# Patient Record
Sex: FEMALE | Race: WHITE | NOT HISPANIC OR LATINO | Employment: UNEMPLOYED | URBAN - METROPOLITAN AREA
[De-identification: names, ages, dates, MRNs, and addresses within clinical notes are randomized per-mention and may not be internally consistent; named-entity substitution may affect disease eponyms.]

---

## 2018-01-11 ENCOUNTER — HOSPITAL ENCOUNTER (INPATIENT)
Facility: HOSPITAL | Age: 83
LOS: 4 days | DRG: 086 | End: 2018-01-16
Attending: SURGERY | Admitting: SURGERY
Payer: MEDICARE

## 2018-01-11 DIAGNOSIS — I62.00 SUBDURAL HEMORRHAGE (HCC): Primary | ICD-10-CM

## 2018-01-11 LAB
ANION GAP SERPL CALCULATED.3IONS-SCNC: 9 MMOL/L (ref 4–13)
BUN SERPL-MCNC: 147 MG/DL (ref 5–25)
CALCIUM SERPL-MCNC: 8.9 MG/DL (ref 8.3–10.1)
CHLORIDE SERPL-SCNC: 104 MMOL/L (ref 100–108)
CO2 SERPL-SCNC: 28 MMOL/L (ref 21–32)
CREAT SERPL-MCNC: 3.95 MG/DL (ref 0.6–1.3)
ERYTHROCYTE [DISTWIDTH] IN BLOOD BY AUTOMATED COUNT: 14.6 % (ref 11.6–15.1)
GFR SERPL CREATININE-BSD FRML MDRD: 10 ML/MIN/1.73SQ M
GLUCOSE SERPL-MCNC: 98 MG/DL (ref 65–140)
HCT VFR BLD AUTO: 31 % (ref 34.8–46.1)
HGB BLD-MCNC: 10.4 G/DL (ref 11.5–15.4)
INR PPP: 1.85 (ref 0.86–1.16)
MCH RBC QN AUTO: 31.8 PG (ref 26.8–34.3)
MCHC RBC AUTO-ENTMCNC: 33.5 G/DL (ref 31.4–37.4)
MCV RBC AUTO: 95 FL (ref 82–98)
PLATELET # BLD AUTO: 187 THOUSANDS/UL (ref 149–390)
PMV BLD AUTO: 11.6 FL (ref 8.9–12.7)
POTASSIUM SERPL-SCNC: 3.8 MMOL/L (ref 3.5–5.3)
PROTHROMBIN TIME: 21.5 SECONDS (ref 12.1–14.4)
RBC # BLD AUTO: 3.27 MILLION/UL (ref 3.81–5.12)
SODIUM SERPL-SCNC: 141 MMOL/L (ref 136–145)
WBC # BLD AUTO: 5.94 THOUSAND/UL (ref 4.31–10.16)

## 2018-01-11 PROCEDURE — 96374 THER/PROPH/DIAG INJ IV PUSH: CPT

## 2018-01-11 PROCEDURE — 85027 COMPLETE CBC AUTOMATED: CPT | Performed by: STUDENT IN AN ORGANIZED HEALTH CARE EDUCATION/TRAINING PROGRAM

## 2018-01-11 PROCEDURE — 80048 BASIC METABOLIC PNL TOTAL CA: CPT | Performed by: STUDENT IN AN ORGANIZED HEALTH CARE EDUCATION/TRAINING PROGRAM

## 2018-01-11 PROCEDURE — 85610 PROTHROMBIN TIME: CPT | Performed by: STUDENT IN AN ORGANIZED HEALTH CARE EDUCATION/TRAINING PROGRAM

## 2018-01-11 PROCEDURE — C9132 KCENTRA, PER I.U.: HCPCS | Performed by: STUDENT IN AN ORGANIZED HEALTH CARE EDUCATION/TRAINING PROGRAM

## 2018-01-11 PROCEDURE — 36415 COLL VENOUS BLD VENIPUNCTURE: CPT | Performed by: STUDENT IN AN ORGANIZED HEALTH CARE EDUCATION/TRAINING PROGRAM

## 2018-01-11 RX ORDER — ALLOPURINOL 300 MG/1
300 TABLET ORAL DAILY
COMMUNITY
End: 2018-05-27 | Stop reason: DRUGHIGH

## 2018-01-11 RX ORDER — MELATONIN
2000 DAILY
COMMUNITY

## 2018-01-11 RX ORDER — POTASSIUM CHLORIDE 750 MG/1
10 TABLET, EXTENDED RELEASE ORAL EVERY OTHER DAY
COMMUNITY

## 2018-01-11 RX ORDER — ATENOLOL 50 MG/1
50 TABLET ORAL 2 TIMES DAILY
Status: DISCONTINUED | OUTPATIENT
Start: 2018-01-11 | End: 2018-01-16 | Stop reason: HOSPADM

## 2018-01-11 RX ORDER — FUROSEMIDE 40 MG/1
20 TABLET ORAL DAILY
COMMUNITY
End: 2018-05-27 | Stop reason: DRUGHIGH

## 2018-01-11 RX ORDER — ALLOPURINOL 300 MG/1
300 TABLET ORAL DAILY
Status: DISCONTINUED | OUTPATIENT
Start: 2018-01-12 | End: 2018-01-16 | Stop reason: HOSPADM

## 2018-01-11 RX ORDER — MELATONIN
2000 DAILY
Status: DISCONTINUED | OUTPATIENT
Start: 2018-01-12 | End: 2018-01-16 | Stop reason: HOSPADM

## 2018-01-11 RX ORDER — ASPIRIN 81 MG/1
81 TABLET ORAL DAILY
COMMUNITY
End: 2018-01-16 | Stop reason: HOSPADM

## 2018-01-11 RX ORDER — ATENOLOL 50 MG/1
50 TABLET ORAL 2 TIMES DAILY
COMMUNITY
End: 2018-06-01 | Stop reason: HOSPADM

## 2018-01-11 RX ORDER — VALSARTAN AND HYDROCHLOROTHIAZIDE 320; 25 MG/1; MG/1
1 TABLET, FILM COATED ORAL DAILY
Status: ON HOLD | COMMUNITY
End: 2018-05-27

## 2018-01-11 RX ORDER — WARFARIN SODIUM 2 MG/1
2 TABLET ORAL
COMMUNITY
End: 2018-01-16 | Stop reason: HOSPADM

## 2018-01-11 RX ADMIN — DESMOPRESSIN ACETATE 20 MCG: 4 SOLUTION INTRAVENOUS at 23:10

## 2018-01-11 RX ADMIN — ATENOLOL 50 MG: 50 TABLET ORAL at 23:24

## 2018-01-11 RX ADMIN — PROTHROMBIN, COAGULATION FACTOR VII HUMAN, COAGULATION FACTOR IX HUMAN, COAGULATION FACTOR X HUMAN, PROTEIN C, PROTEIN S HUMAN, AND WATER 3156 UNITS: KIT at 19:51

## 2018-01-12 ENCOUNTER — APPOINTMENT (OUTPATIENT)
Dept: RADIOLOGY | Facility: HOSPITAL | Age: 83
DRG: 086 | End: 2018-01-12
Payer: MEDICARE
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
## 2018-01-12 PROBLEM — F02.80 ALZHEIMER DISEASE (HCC): Status: ACTIVE | Noted: 2018-01-12

## 2018-01-12 PROBLEM — I50.9 CHF (CONGESTIVE HEART FAILURE) (HCC): Status: ACTIVE | Noted: 2018-01-12

## 2018-01-12 PROBLEM — G30.9 ALZHEIMER DISEASE (HCC): Status: ACTIVE | Noted: 2018-01-12

## 2018-01-12 PROBLEM — I60.9 SAH (SUBARACHNOID HEMORRHAGE) (HCC): Status: ACTIVE | Noted: 2018-01-12

## 2018-01-12 PROBLEM — I62.00 SUBDURAL HEMORRHAGE (HCC): Status: ACTIVE | Noted: 2018-01-12

## 2018-01-12 PROBLEM — I48.91 ATRIAL FIBRILLATION (HCC): Status: ACTIVE | Noted: 2018-01-12

## 2018-01-12 PROBLEM — S06.369A INTRAPARENCHYMAL HEMATOMA OF BRAIN DUE TO TRAUMA (HCC): Status: ACTIVE | Noted: 2018-01-12

## 2018-01-12 PROBLEM — N17.9 AKI (ACUTE KIDNEY INJURY) (HCC): Status: ACTIVE | Noted: 2018-01-12

## 2018-01-12 PROBLEM — I10 HTN (HYPERTENSION): Status: ACTIVE | Noted: 2018-01-12

## 2018-01-12 PROBLEM — D68.9 COAGULOPATHY (HCC): Status: ACTIVE | Noted: 2018-01-12

## 2018-01-12 LAB
ANION GAP SERPL CALCULATED.3IONS-SCNC: 10 MMOL/L (ref 4–13)
BUN SERPL-MCNC: 140 MG/DL (ref 5–25)
CALCIUM SERPL-MCNC: 9.3 MG/DL (ref 8.3–10.1)
CHLORIDE SERPL-SCNC: 105 MMOL/L (ref 100–108)
CO2 SERPL-SCNC: 26 MMOL/L (ref 21–32)
CREAT SERPL-MCNC: 3.58 MG/DL (ref 0.6–1.3)
ERYTHROCYTE [DISTWIDTH] IN BLOOD BY AUTOMATED COUNT: 14.7 % (ref 11.6–15.1)
GFR SERPL CREATININE-BSD FRML MDRD: 11 ML/MIN/1.73SQ M
GLUCOSE SERPL-MCNC: 100 MG/DL (ref 65–140)
HCT VFR BLD AUTO: 33 % (ref 34.8–46.1)
HGB BLD-MCNC: 11.1 G/DL (ref 11.5–15.4)
INR PPP: 1.2 (ref 0.86–1.16)
MCH RBC QN AUTO: 32.1 PG (ref 26.8–34.3)
MCHC RBC AUTO-ENTMCNC: 33.6 G/DL (ref 31.4–37.4)
MCV RBC AUTO: 95 FL (ref 82–98)
PLATELET # BLD AUTO: 193 THOUSANDS/UL (ref 149–390)
PMV BLD AUTO: 11.2 FL (ref 8.9–12.7)
POTASSIUM SERPL-SCNC: 3.6 MMOL/L (ref 3.5–5.3)
PROTHROMBIN TIME: 15.3 SECONDS (ref 12.1–14.4)
RBC # BLD AUTO: 3.46 MILLION/UL (ref 3.81–5.12)
SODIUM SERPL-SCNC: 141 MMOL/L (ref 136–145)
WBC # BLD AUTO: 7.39 THOUSAND/UL (ref 4.31–10.16)

## 2018-01-12 PROCEDURE — 99285 EMERGENCY DEPT VISIT HI MDM: CPT

## 2018-01-12 PROCEDURE — 36415 COLL VENOUS BLD VENIPUNCTURE: CPT | Performed by: STUDENT IN AN ORGANIZED HEALTH CARE EDUCATION/TRAINING PROGRAM

## 2018-01-12 PROCEDURE — 70450 CT HEAD/BRAIN W/O DYE: CPT

## 2018-01-12 PROCEDURE — 85610 PROTHROMBIN TIME: CPT | Performed by: STUDENT IN AN ORGANIZED HEALTH CARE EDUCATION/TRAINING PROGRAM

## 2018-01-12 PROCEDURE — 85027 COMPLETE CBC AUTOMATED: CPT | Performed by: STUDENT IN AN ORGANIZED HEALTH CARE EDUCATION/TRAINING PROGRAM

## 2018-01-12 PROCEDURE — 80048 BASIC METABOLIC PNL TOTAL CA: CPT | Performed by: STUDENT IN AN ORGANIZED HEALTH CARE EDUCATION/TRAINING PROGRAM

## 2018-01-12 RX ORDER — SODIUM CHLORIDE 9 MG/ML
50 INJECTION, SOLUTION INTRAVENOUS CONTINUOUS
Status: DISCONTINUED | OUTPATIENT
Start: 2018-01-12 | End: 2018-01-14

## 2018-01-12 RX ORDER — ACETAMINOPHEN 325 MG/1
650 TABLET ORAL EVERY 8 HOURS SCHEDULED
Status: DISCONTINUED | OUTPATIENT
Start: 2018-01-12 | End: 2018-01-16 | Stop reason: HOSPADM

## 2018-01-12 RX ADMIN — SODIUM CHLORIDE 50 ML/HR: 0.9 INJECTION, SOLUTION INTRAVENOUS at 16:20

## 2018-01-12 RX ADMIN — ACETAMINOPHEN 650 MG: 325 TABLET, FILM COATED ORAL at 21:41

## 2018-01-12 RX ADMIN — VITAMIN D, TAB 1000IU (100/BT) 2000 UNITS: 25 TAB at 08:53

## 2018-01-12 RX ADMIN — ALLOPURINOL 300 MG: 300 TABLET ORAL at 08:53

## 2018-01-12 RX ADMIN — SODIUM CHLORIDE 50 ML/HR: 0.9 INJECTION, SOLUTION INTRAVENOUS at 12:16

## 2018-01-12 NOTE — ED NOTES
Patient care handoff occurred at this time   Report given by Tobi Bernstein, RN     Alanna Joya RN  01/11/18 2051

## 2018-01-12 NOTE — ASSESSMENT & PLAN NOTE
- no evidence of acute exacerbation  - hold lasix an additional day due to low SBP prior to atenolol and need for IVF hydration due to ANABEL

## 2018-01-12 NOTE — ASSESSMENT & PLAN NOTE
· Per family altered mental status and confusion is baseline  · Oriented times self but not place or time a baseline  · Consider geriatrics consultation

## 2018-01-12 NOTE — TERTIARY TRAUMA SURVEY
Progress Note - Tertiary Trauma Survery   Cheri Efrain 80 y o  female MRN: [id number]  Unit/Bed#: ED 12 Encounter: [id number]    Summary of Diagnosed Injuries:   SDH  Intraparenchymal hemorrhage  Coumadin coagulopathy  Fall    Clinical Plan:   * Subdural hemorrhage [facility])   Assessment & Plan    Admit to [facility] Protocol  Neurosurgery consult  Appreciate consultation  Repeat CT head today  Attempt to obtain images from [facility]   Correct Coumadin coagulopathy  Received 10 mg vitamin K, FFP, DDAVP and KCentra  INR 1 2  Repeat in am  Hold all AC/AP  Q 1hr neuro checks  PT/OT            Coagulopathy (HCC)   Assessment & Plan    Received 10 mg vitamin K and 1 unit FFP prior to arrival   DDAVP and case and treatment facility  Repeat INR 1 2  Check INR in a m  Goal less than 2 0        ANABEL (acute kidney injury) ([facility] [address] )   Assessment & Plan    Gentle IV hydration  Monitor I & Os  Trend BUN/creatinine        Atrial fibrillation (HCC)   Assessment & Plan    Rate controlled  Continue atenolol 50 mg b i d  Hold Coumadin in setting of ICH        CHF (congestive heart failure) ([facility] [address] )   Assessment & Plan    Does not appear in acute exacerbation  Monitor fluid balance  Goal fluid balance net even  Gentle fluid hydration in setting of ANABEL  Hold home Lasix  Full likely restart tomorrow  HTN (hypertension)   Assessment & Plan      Hold home antihypertensives in setting of soft systolic blood pressures  Alzheimer disease   Assessment & Plan    Appears at baseline  GCS 14  Oriented to person only  Frequent reorienting/redirection  Attempt to regulate sleep-wake cycles  Geriatric consultation  Mechanism of Injury: Fall    Transfer from: home  Outside Films Received: no   Received reports  No images were uploaded to PACs  Tertiary Exam Due on:  1/12/17    Vitals: Blood pressure 94/60, pulse 62, temperature 97 5 °F (36 4 °C), temperature source Oral, resp   rate 22, height 5' (1 524 m), weight 81 6 kg (180 lb), SpO2 96 %  ,Body mass index is 35 15 kg/m²  CT / RADIOGRAPHS: ALL RESULTS MUST BE CONFIRMED BY FACULTY OR PRINTED REPORT    CT HEAD:  CT CHEST:    CT FACE:  CT ABDOMEN / PELVIS:   CT CERVICAL SPINE:  XR PELVIS:    CT THORACIC / LUMBAR SPINE:  CXR CHEST:    OTHER: OTHER:    OTHER:  OTHER:    OTHER: OTHER:    OTHER:  OTHER:    OTHER:  OTHER:      Consultants - List Service/ Faculty and Date:  Neurosurgery  Active medications:           Current Facility-Administered Medications:     allopurinol (ZYLOPRIM) tablet 300 mg, 300 mg, Oral, Daily, 300 mg at 01/12/18 0853    atenolol (TENORMIN) tablet 50 mg, 50 mg, Oral, BID, Stopped at 01/12/18 0856    cholecalciferol (VITAMIN D3) tablet 2,000 Units, 2,000 Units, Oral, Daily, 2,000 Units at 01/12/18 0853    sodium chloride 0 9 % infusion, 50 mL/hr, Intravenous, Continuous, 50 mL/hr at 01/12/18 1216    Current Outpatient Prescriptions:     allopurinol (ZYLOPRIM) 300 mg tablet    aspirin (ECOTRIN LOW STRENGTH) 81 mg EC tablet    atenolol (TENORMIN) 50 mg tablet    cholecalciferol (VITAMIN D3) 1,000 units tablet    furosemide (LASIX) 40 mg tablet    Melatonin-Pyridoxine ER (MELATONEX) 3-10 MG TBCR    potassium chloride (K-DUR,KLOR-CON) 10 mEq tablet    valsartan-hydrochlorothiazide (DIOVAN-HCT) 320-25 MG per tablet    warfarin (COUMADIN) 2 mg tablet    No intake or output data in the 24 hours ending 01/12/18 1246    Invasive Devices          No matching active lines, drains, or airways          CAGE-AID Questionnaire:    Was the patient able to participate in the CAGE-AID screening questions on admission? Yes    Is the patient 65 years or older: YES:    1  Before the illness or injury that brought you to the Emergency, did you need someone to help you on a regular basis? 1=Yes   2  Since the illness or injury that brought you to the Emergency, have you needed more help than usual to take care of yourself? 1=Yes   3   Have you been hospitalized for one or more nights during the past 6 months (excluding a stay in the Emergency Department)? 0=No   4  In general, do you see well? 1=No   5  In general, do you have serious problems with your memory? 1=Yes   6  Do you take more than three different medications everyday? 1=Yes   TOTAL   5     Did you order a geriatric consult if the score was 2 or greater?: yes    1  GCS:  GCS Total:  14, Eye Opening:   Spontaneous = 4, Motor Response: Obeys commands = 6 and Verbal Response:  Confused = 4  2  Head:   a  Inspect and palpate SCALP for:  lac/abrasion:  None, b  Inspect and palpate FACE for:   lac/abrasion:  None, c  Examine EYES Record: pupil size/reaction:  Appropriate, d  Inspect MOUTH for:  Present and e  Inspect EARS for:  CSF leak, hemotympanum:  None small abrasion to auricle of left ear  3  Neck:   a  Inspect for lac/abrasion/hematoma/swelling:  None, d   C-spine cleared radiographically:  yes and e   C-spine cleared clinically:  yes  4  Chest:   WNL and a  Inspect for lac/abrasion/hematoma/swelling:  None  5  Abdomen/Pelvis:   a  Inspect for:    lac/abrasion:  None, b   Palpate for:   tenderness/guarding:  None and c  PELVIS:  stability/tenderness:  stable  6  Back (log roll with spinal immobilization unless cleared radiographically):   a  Inspect back of head/entire back for:   swelling/ecchymosis:  None and b   Palpate for tenderness and deformity:  vertebrae (T-L-S spine):  None  7  Extremities:   Lacs, abrasions, swelling, ecchymosis:  Multiple ecchymoses on bilateral upper extremities and multiple stages of healing  Tenderness, pain with motor, instability: N/A  8  Peripheral Nerves: WNL    Do NOT use the following abbreviations: DTO, gr, Lynn, MS, MSO4, MgSO4, Nitro, QD, QID, QOD, u, , ?, ?g or trailing zeros   Always use a zero before a decimal     Labs:   CBC:   Lab Results   Component Value Date    WBC 7 39 01/12/2018    HGB 11 1 (L) 01/12/2018    HCT 33 0 (L) 01/12/2018    MCV 95 01/12/2018     01/12/2018    MCH 32 1 01/12/2018    MCHC 33 6 01/12/2018    RDW 14 7 01/12/2018    MPV 11 2 01/12/2018     CMP:   Lab Results   Component Value Date     01/12/2018     01/12/2018    CO2 26 01/12/2018    ANIONGAP 10 01/12/2018     (H) 01/12/2018    CREATININE 3 58 (H) 01/12/2018    GLUCOSE 100 01/12/2018    CALCIUM 9 3 01/12/2018    EGFR 11 01/12/2018     Coagulation:   Lab Results   Component Value Date    INR 1 20 (H) 01/12/2018

## 2018-01-12 NOTE — ASSESSMENT & PLAN NOTE
- slowly improving, continue gentle IV hydration  - Monitor I & Os  - repeat BMP in AM  - unclear baseline, no other charts for patient in system and no prior BMP

## 2018-01-12 NOTE — ASSESSMENT & PLAN NOTE
Received 10 mg vitamin K and 1 unit FFP prior to arrival   DDAVP and case and treatment facility  Repeat INR 1 2  Check INR in a m    Goal less than 2 0

## 2018-01-12 NOTE — CONSULTS
Consultation - Neurosurgery   Yaa Sanchez 80 y o  female MRN: [id number]  Unit/Bed#: [facility] 927-01 Encounter: [id number]        * Subdural hemorrhage ([address] )   Assessment & Plan    · Patient presented status post fall out of bed 1/10  Initially presented to [facility]   Upon completion of CT head intracranial hemorrhage was identified and patient was transferred to [facility] for neurosurgical evaluation  · Repeat CT head 1/12 reveals scattered multicompartmental intracranial hemorrhage  Interhemispheric subdural and subarachnoid hemorrhage  Right frontal region of rounded intraparenchymal hemorrhage with adjacent subarachnoid hemorrhage  Subarachnoid hemorrhage within left sylvian fissure and right frontal lobe  · Cannot exclude aneurysmal cause of hemorrhage, but due to mechanism more consistent with traumatic hemorrhage  Defer CTA at this time  · Upon discussion with son-in-law Ford Mad[place])  of [facility] [place]) he expresses the patient is DNR level 3 upon discussion with attending last evening patient would not want any heroic measures  · No neurosurgical intervention indicated at this time  Upon further discussion with daughter, Silke Barlow- she does not want any further surveillance imaging even in setting of change in mental status  She would like to focus on patient remaining comfortable  · May continue neuro checks at this time  Patient GCS 14 with baseline confusion  Alert and oriented times self but not place or time  · Patient on aspirin and Coumadin prior to admission for history of atrial fibrillation  · INR on admission was 1 8  Improved to 1 2 with reversal   Continue to hold his Coumadin and aspirin at this time  May consider indefinite discontinuation based on risk benefit associated with fall risk  Hold Coumadin aspirin at least 2 weeks post-trauma  · Defer pharmacologic DVT prophylaxis at this time     · Defer Keppra at this time due to age of 80 and concern of somnolence with antiepileptics  Intraparenchymal hematoma of brain due to trauma [facility])   Assessment & Plan    Plan as above  SAH (subarachnoid hemorrhage) ([facility] [address] )   Assessment & Plan    Plan as above  Atrial fibrillation (HCC)   Assessment & Plan    · On Coumadin and aspirin prior to admission- hold for at least 2 weeks post trauma  Appreciate plan above  Fall from bed   Assessment & Plan    · Patient uses roller walker at baseline  Currently resides with daughter and son-in-law  · Head prolonged previous hospitalization  Prior to that patient was living home independently  · Per son-in-law patient has not had prior falls  · Patient cleared ambulate with physical therapy and occupational therapy  Will likely need placement or home services upon discharge  Alzheimer disease   Assessment & Plan    · Per family altered mental status and confusion is baseline  · Oriented times self but not place or time a baseline  · Consider geriatrics consultation  Upon discussion with daughter and Marcus Ada, family does not request any further surveillance imaging or follow-up due to expressed preference is of DNR level 3 without extraordinary measures  Neurosurgery will see on an as-needed basis remainder of hospitalization  Please call with further questions or concerns  History of Present Illness     History, ROS and PFSH limited from patient due to altered mental status  Limited history obtained from patient's son-in-law, Doris Lim  HPI: Jules Zimmer is a 80y o  year old female, past medical history of atrial fibrillation on Coumadin aspirin, Alzheimer's disease, CHF, hypertension, gout, presents with multicompartmental hemorrhage including interhemispheric subdural, subarachnoid, right frontal intraparenchymal hemorrhage after fall out of bed  Patient resides with her daughter and son-in-law  Night of 1/10 they heard her fall out of bed    Patient was helped back to bed, on morning of 1/11 she was known to have generalized weakness  EMS was called  Upon arrival she was noted to be hypotensive  She was taken to [facility]  Upon completion of CT head multicompartmental hemorrhage was identified and patient was transferred to [facility] for neurosurgical evaluation  Upon arrival INR was 1 8  Upon following reversal INR proved to 1 2  Patient was noted to be GCS 14, oriented times herself but not place or time  Upon further discussion with son-in-law this is her baseline  Patient denies any complaints including headache, dizziness, confusion, neck pain, back pain  She is not aware of events leading up to hospitalization  Patient has been living with her son and daughter-in-law for several weeks to months  Prior she had a prolonged hospitalization but before that was living independently  Daughter is retired RN and states that she has a history of stage 5 dementia  Inpatient consult to Neurosurgery  Consult performed by: Lebron Kednrick  Consult ordered by: Ziyad Sanches          Review of Systems   Unable to perform ROS: Dementia   Eyes: Negative for visual disturbance  Respiratory: Negative for shortness of breath  Cardiovascular: Negative for chest pain  Gastrointestinal: Negative for abdominal pain  Genitourinary: Negative for difficulty urinating  Musculoskeletal: Positive for back pain  Negative for neck pain  Neurological: Negative for dizziness and headaches  Psychiatric/Behavioral: Positive for confusion  Historical Information   Past Medical History:   Diagnosis Date    A-fib [facility])     Alzheimer's dementia     CHF (congestive heart failure) (HCC)     Gout     Hypertension     Renal disorder      History reviewed  No pertinent surgical history  History   Alcohol Use No     History   Drug Use No     History   Smoking Status    Never Smoker   Smokeless Tobacco    Never Used     History reviewed   No pertinent family history  Meds/Allergies   all current active meds have been reviewed, current meds:   Current Facility-Administered Medications   Medication Dose Route Frequency    allopurinol (ZYLOPRIM) tablet 300 mg  300 mg Oral Daily    atenolol (TENORMIN) tablet 50 mg  50 mg Oral BID    cholecalciferol (VITAMIN D3) tablet 2,000 Units  2,000 Units Oral Daily    sodium chloride 0 9 % infusion  50 mL/hr Intravenous Continuous    and PTA meds:   Prior to Admission Medications   Prescriptions Last Dose Informant Patient Reported? Taking? Melatonin-Pyridoxine ER ([address] [place] [address]) 3-10 MG TBCR 1/11/2018 at Unknown time  Yes Yes   Sig: Take 1 tablet by mouth   allopurinol (ZYLOPRIM) 300 mg tablet 1/11/2018 at Unknown time  Yes Yes   Sig: Take 300 mg by mouth daily   aspirin (ECOTRIN LOW STRENGTH) 81 mg EC tablet 1/11/2018 at Unknown time  Yes Yes   Sig: Take 81 mg by mouth daily   atenolol (TENORMIN) 50 mg tablet 1/11/2018 at Unknown time  Yes Yes   Sig: Take 50 mg by mouth 2 (two) times a day   cholecalciferol (VITAMIN D3) 1,000 units tablet 1/11/2018 at Unknown time  Yes Yes   Sig: Take 2,000 Units by mouth daily   furosemide (LASIX) 40 mg tablet 1/11/2018 at Unknown time  Yes Yes   Sig: Take 40 mg by mouth 2 (two) times a day   potassium chloride (K-DUR,KLOR-CON) 10 mEq tablet 1/11/2018 at Unknown time  Yes Yes   Sig: Take 10 mEq by mouth daily   valsartan-hydrochlorothiazide (DIOVAN-HCT) 320-25 MG per tablet 1/11/2018 at Unknown time  Yes Yes   Sig: Take 1 tablet by mouth daily   warfarin (COUMADIN) 2 mg tablet 1/11/2018 at Unknown time  Yes Yes   Sig: Take 2 mg by mouth daily      Facility-Administered Medications: None     No Known Allergies    Objective   No intake or output data in the 24 hours ending 01/12/18 1512    Physical Exam   Constitutional: She appears well-developed  HENT:   Head: Normocephalic and atraumatic  Eyes: Conjunctivae and EOM are normal  Pupils are equal, round, and reactive to light     Neck: Nontender to posterior palpation  Cardiovascular: Normal rate  Hypotensive  Pulmonary/Chest: Effort normal    Musculoskeletal:   No thoracic or lumbar midline tenderness to palpation  Neurological: GCS eye subscore is 4  GCS verbal subscore is 4  GCS motor subscore is 6  Skin: Skin is warm and dry  Psychiatric: Her speech is normal      Neurologic Exam     Mental Status   Oriented to person  Disoriented to place  Disoriented to time  Follows 3 step commands  Attention: normal  Concentration: normal    Speech: speech is normal   Level of consciousness: alert  Knowledge: poor  Unable to perform simple calculations  Abnormal comprehension  Cranial Nerves   Cranial nerves II through XII intact  CN III, IV, VI   Pupils are equal, round, and reactive to light  Extraocular motions are normal      Motor Exam Generalized weakness, but full strength in bilateral upper and lower extremities       Vitals:Blood pressure 90/54, pulse 68, temperature 97 5 °F (36 4 °C), temperature source Oral, resp  rate 22, height 5' (1 524 m), weight 81 6 kg (180 lb), SpO2 99 %  ,Body mass index is 35 15 kg/m²  Lab Results:   I have personally reviewed pertinent results  Lab Results   Component Value Date    WBC 7 39 01/12/2018    HGB 11 1 (L) 01/12/2018    HCT 33 0 (L) 01/12/2018    MCV 95 01/12/2018     01/12/2018    MCH 32 1 01/12/2018    MCHC 33 6 01/12/2018    RDW 14 7 01/12/2018    MPV 11 2 01/12/2018     01/12/2018     01/12/2018    CO2 26 01/12/2018    ANIONGAP 10 01/12/2018     (H) 01/12/2018    CREATININE 3 58 (H) 01/12/2018    GLUCOSE 100 01/12/2018    CALCIUM 9 3 01/12/2018    EGFR 11 01/12/2018    INR 1 20 (H) 01/12/2018       Imaging Studies: I have personally reviewed pertinent reports  and I have personally reviewed pertinent films in PACS    EKG, Pathology, and Other Studies: I have personally reviewed pertinent reports     and I have personally reviewed pertinent films in PACS    VTE Prophylaxis: Reason for no pharmacologic prophylaxis SAH, SDH, IPH    Code Status: Level 3 - DNAR and DNI  Advance Directive and Living Will:      Power of :    POLST:

## 2018-01-12 NOTE — ASSESSMENT & PLAN NOTE
Appears at baseline  GCS 14  Oriented to person only  Frequent reorienting/redirection  Attempt to regulate sleep-wake cycles  Geriatric consultation

## 2018-01-12 NOTE — ASSESSMENT & PLAN NOTE
- Repeat CT head stable yesterday  - Received 10 mg vitamin K, FFP, DDAVP and KCentra   INR stable at 1 2 s/p reversal    - Hold all AC/AP  - continue neuro checks q4h, can d/c HOT protocol  - No seizure ppx; per neurosurgery    - PT/OT pending today

## 2018-01-12 NOTE — ASSESSMENT & PLAN NOTE
· Patient presented status post fall out of bed 1/10  Initially presented to [facility]   Upon completion of CT head intracranial hemorrhage was identified and patient was transferred to [facility] for neurosurgical evaluation  · Repeat CT head 1/12 reveals scattered multicompartmental intracranial hemorrhage  Interhemispheric subdural and subarachnoid hemorrhage  Right frontal region of rounded intraparenchymal hemorrhage with adjacent subarachnoid hemorrhage  Subarachnoid hemorrhage within left sylvian fissure and right frontal lobe  · Cannot exclude aneurysmal cause of hemorrhage, but due to mechanism more consistent with traumatic hemorrhage  Defer CTA at this time  · Upon discussion with son-in-law Phill Zeng)  of [place]) he expresses the patient is DNR level 3 upon discussion with attending last evening patient would not want any heroic measures  · No neurosurgical intervention indicated at this time  We discussed with family whether not surveillance imaging is requested  If no intervention considered an any situation, may due for follow-up CT scans  · Requested daughter, Dinah Berry, calls neurosurgery service to clarify wishes  · May continue frequent neuro checks at this time  Patient GCS 14 with baseline confusion  Alert and oriented times self but not place or time  · Patient on aspirin and Coumadin prior to admission for history of atrial fibrillation  · INR on admission was 1 8  Improved to 1 2 with reversal   Continue to hold his Coumadin and aspirin at this time  May consider indefinite discontinuation based on risk benefit associated with fall risk  Hold Coumadin aspirin at least 2 weeks post-trauma  · Defer pharmacologic DVT prophylaxis at this time  May consider upon completion of stable CT scan  · Defer Keppra at this time due to age of 80 and concern of somnolence with antiepileptics

## 2018-01-12 NOTE — ASSESSMENT & PLAN NOTE
· On Coumadin and aspirin prior to admission- hold for at least 2 weeks post trauma  Appreciate plan above

## 2018-01-12 NOTE — CONSULTS
Consultation - Geriatric Medicine   Delbert Williamson 80 y o  female MRN: [id number]  Unit/Bed#: ED 12 Encounter: [id number]      Assessment/Plan     Assessment/Plan:  1 )  Dementia- Alzheimer's at baseline  Supportive care  Discussed with dgt that she could follow up with [facility] - info in dc portion of chart  Will check b12, folate, TSH to rule out reversible causes of confusion  2 )  Ambulatory dysfunction - walker at baseline  PT/OT to eval, will likely require st rehab for balance and strength  Fall precautions  3 )  Deconditioning - related to acute and chronic conditions  Turn and reposition often  Monitor for skin integrity,  Encourage to cough and deep breath  DVT prophylaxix- scd's   4 )  Delirium - mod risk r/t underlying cog imp, sdh, new environment, had at home  [place] delirium precautions  First line treatment is reorient, redirect, reassure  Include family as able  Avoid deliriogenic medications such as tramadol, benadryl, benzodiazepines, high dose narcotices  Identify and treat reversible causes of confusion: urinary retention, pain, constipation, infection,  Monitor and correct any electrolyte imbalances/anemia  Engage in daytime activity (cognitive, physical, social)  Avoid daytime sleep  Encourage good sleep hygiene  Cluster care and minimize distractions  Ensure adequate hydration and nutrition  Mobilize early and often according to patient's poc  If patient's behaviors are not redirectable with increased 1:1 observation and is threat to herself or others, consider zyprexa 2 5mg IM q8hrs prn:  Severe agitations  Would use as infrequently as possible, for shortest duration possible  Consider alternative cause of agitation such as pain, urinary retention  5 )  At risk for falls - fall at home with sdh  Fall precautions  PT/OT to eval  Already taking vitamin d3 for bone health    Consider pain as a source of agitation if patient unable to make needs known  Refer to geriatric pain management protocol if needed  ? Normal gfr/cr for patient? - monitor closely for dehydration and avoid nephrotoxins  6 )  Urinary incontinence  Consider urinalysis check - patient co back pain at home, increased delirium at home  Monitor for urinary retention  Toilet frequently  Maintain good [place] area hygiene   7 )  Vision impairment - glasses at home  Supportive care  Ensure adequate lighting   8 )  SDH s/p fall on coumadin  Trauma following  Neurosx consult  Coumadin on hold  9 )  Nutrition risk -  Encourage sm frequent nutritious, protein dense meals and snacks that patient enjoys  Consider protein supp if nutritional intake is inadequate  10 )  Care giver stress - patient's daughter  May need to discuss options for further assistance to take care of her mom at home as the goal if for patient to return after acute illness resolved  All other acute and chronic conditions managed by med teams  History of Present Illness   Physician Requesting Consult: Ermelinda Hernandez MD  Reason for Consult / Principal Problem: SDH  Hx and PE limited by: dementia   HPI: Sabina Gates is a 80y o  year old female who presents with subdural hematoma after falling out of bed  Patient is poor historian secondary to dementia  PMH includes afib, alzheimer's dementia, chf, gout, renal disorder  Met with patient who states that she lives with her niece and does well there  She states she has glasses, dentures, and hearing aides that she uses "sometimes "  She states she ambulates with a walker,  But only "when the kids are around,"  She denies pain  No constiaption, good appetite  She is not able to tell me her last name or   She tells me she is here because her niece is here  Called to pt's dgt with whom patient lives    She states that pt was on her own until this past September when dementia progressed to a point that she needed to be with someone,  She was in hospital at that time for gout and kidney failure  She went to rehab then to daughter's  Per daughter she was progressing nicely until early this week when she began to decline  She was awake for several nights trying to "go home "  Dgt states pt norm is to pull plugs out at night, but is easily redirectable  She wonders if on the night of fall, patient wasn't trying to pull out plugs and turned and fell  She states that night, she also passed out on commode  She had been complaining of back pain and home care nurse thought she might have UTI  Daughter states patient is assist with adl/iadls and has found it difficult to care for her this week as patient was not feeling well  Consults    Review of Systems   Reason unable to perform ROS: limited by dementia  Constitutional: Negative  HENT: Negative  Respiratory: Negative  Cardiovascular: Negative  Gastrointestinal: Negative  Genitourinary: Positive for difficulty urinating  Musculoskeletal: Positive for gait problem  Neurological: Negative for dizziness and headaches  Psychiatric/Behavioral: Positive for behavioral problems (per dgt at night only, easily redirectable normally), confusion and decreased concentration  Negative for dysphoric mood  The patient is not nervous/anxious  Geriatric Conditions: gait dysfunction, cog imp, assist with adl/iadls    Historical Information   Past Medical History:   Diagnosis Date    A-fib ([facility] )     Alzheimer's dementia     CHF (congestive heart failure) ([facility] )     Gout     Hypertension     Renal disorder      History reviewed  No pertinent surgical history    Social History   History   Alcohol Use No     History   Drug Use No     History   Smoking Status    Never Smoker   Smokeless Tobacco    Never Used     Family History: non-contributory    Meds/Allergies   all current active meds have been reviewed    No Known Allergies    Objective   No intake or output data in the 24 hours ending 01/12/18 1420  Invasive Devices          No matching active lines, drains, or airways          Physical Exam   Constitutional: She appears well-developed and well-nourished  No distress  HENT:   Head: Normocephalic  Cardiovascular: Normal rate and normal heart sounds  Exam reveals no gallop and no friction rub  No murmur heard  Pulmonary/Chest: Effort normal and breath sounds normal  No respiratory distress  She has no wheezes  She has no rales  Abdominal: Soft  Bowel sounds are normal  She exhibits no distension  There is no tenderness  There is no rebound  Musculoskeletal: Normal range of motion  Neurological: She is alert  Oriented to first name only, not to situation, place, time  Cooperative, follows some commands, engages in convo   Skin: Skin is warm and dry  She is not diaphoretic  Nursing note and vitals reviewed        Lab Results:   Lab Results   Component Value Date    WBC 7 39 01/12/2018    RBC 3 46 (L) 01/12/2018    HGB 11 1 (L) 01/12/2018    HCT 33 0 (L) 01/12/2018    MCV 95 01/12/2018     01/12/2018     No results found for: WVHRXXDW70  No results found for: TSH, T8LETQC, FREET4  No components found for: VITAMIND1, 25-DIHYDROXY  Lab Results   Component Value Date     01/12/2018    K 3 6 01/12/2018     01/12/2018    CO2 26 01/12/2018    ANIONGAP 10 01/12/2018     (H) 01/12/2018    CREATININE 3 58 (H) 01/12/2018    GLUCOSE 100 01/12/2018    CALCIUM 9 3 01/12/2018    EGFR 11 01/12/2018     No results found for: [address], PHUR, LEUKOCYTESUR, NITRITE, PROTEINUA, GLUCOSEU, KETONESU, BILIRUBINUR, BLOODU  No results found for: ALBUMIN, PREALBUMIN, LABPRE  Lab Results   Component Value Date    INR 1 20 (H) 01/12/2018     No results found for: BLOODCX  No results found for: URINECX    Imaging Studies: reviewed in EMR    Therapies:   PT/OT: eval pending    VTE Prophylaxis: RX contraindicated due to: sdh    Code Status: Level 3 - DNAR and DNI  Advance Directive and Living Will:      Power of :    POLST:      Family and Social Support: lives with daughter  Daughter reachable by cell # on chart  No Data Recorded    Goals of Care: unable to discuss    Counseling / Coordination of Care  Total floor / unit time spent today 35+ minutes  Greater than 50% of total time was spent with the patient and / or family counseling and / or coordination of care  A description of the counseling / coordination of care: patient eval, chart review

## 2018-01-12 NOTE — ASSESSMENT & PLAN NOTE
· Patient uses roller walker at baseline  Currently resides with daughter and son-in-law  · Head prolonged previous hospitalization  Prior to that patient was living home independently  · Per son-in-law patient has not had prior falls  · Patient cleared ambulate with physical therapy and occupational therapy  Will likely need placement or home services upon discharge

## 2018-01-12 NOTE — ASSESSMENT & PLAN NOTE
- Appears at baseline, oriented to self, location, and situation  - Frequent reorienting/redirection   - Attempt to regulate sleep-wake cycles  - Appreciate geriatric consultation

## 2018-01-12 NOTE — ASSESSMENT & PLAN NOTE
Does not appear in acute exacerbation  Monitor fluid balance  Hold that even  Gentle fluid hydration in setting of ANABEL  Hold home Lasix  Full likely restart tomorrow

## 2018-01-12 NOTE — CASE MANAGEMENT
Initial Clinical Review    Admission: Date/Time/Statement: Observation 1/11/18 @ 2107 & CHANGED TO INPATIENT ON 1/12/18 @ 1534    Orders Placed This Encounter   Procedures     01/12/18 1534  Inpatient Admission Once     Transfer Service: Trauma       Question Answer Comment   Admitting Physician Mary Adan    Level of Care Level 2 Stepdown / HOT    Bed Type Trauma    Estimated length of stay More than 2 Midnights    Certification I certify that inpatient services are medically necessary for this patient for a duration of greater than two midnights  See H&P and MD Progress Notes for additional information about the patient's course of treatment  01/12/18 1534       ED: Date/Time/Mode of Arrival:   ED Arrival Information     Expected Arrival Acuity Means of Arrival Escorted By Service Admission Type    1/11/2018 15:08 1/11/2018 17:34 Immediate Ambulance [facility] Mireille More) Trauma Emergency    Arrival Complaint    fall, sdh, inr 5        Chief Complaint:   Chief Complaint   Patient presents with    Multiple Falls     patient trauma transfer - multiple falls and weakness      History of Illness: Tony Mars is a 80 y o  female who presents after falling out of bed yesterday  She reports falling approximately 3 feet onto her side  Denies loss of consciousness  Family helped her back into bed  [place]ena again today, prompting family to bring her to the hospital  Was [facility] at [facility], where a CT showed subdural hemorrhage as well as an intraparenchymal hemorrhage  Takes Aspirin, as well as coumadin for Afib    Mechanism:Fall, +LOC:      ED Vital Signs:   ED Triage Vitals   Temperature Pulse Respirations Blood Pressure SpO2   01/11/18 1740 01/11/18 1740 01/11/18 1740 01/11/18 1740 01/11/18 1740   98 7 °F (37 1 °C) 60 20 (!) 89/56 98 %      Temp Source Heart Rate Source Patient Position - Orthostatic VS BP Location FiO2 (%)   01/11/18 1740 01/11/18 1740 01/11/18 1740 01/11/18 1958 --   Oral Monitor Lying Right arm       Pain Score       01/11/18 2130       No Pain        Wt Readings from Last 1 Encounters:   01/11/18 81 6 kg (180 lb)     Vital Signs (abnormal):   01/12/18 0400  --  98  --   89/58  --  --  Lying   01/11/18 2330  --  76  18   160/118  97 %  --  --   01/11/18 1958  --  68  18   89/62  96 %  Nasal cannula  Lying   01/11/18 1935  --  77  18   89/62  98 %  --  Lying   01/11/18 1905  --  69  20   77/55  96 %  --  Lying   01/11/18 1740  98 7 °F (37 1 °C)  60  20   89/56  98 %  --  Lying     Abnormal Labs:   1/11 1/12    140   Creatinine 3 95 3 58     RBC 3 27 3 46   Hemoglobin 10 4 11 1   Hematocrit 31 0 33 0     Protime 21 5 15 3   INR 1 85 1 20     Diagnostic Test Results:   Done at referring hospital     ED Treatment:   Medication Administration from 01/11/2018 1508 to 01/12/2018 0905    Date/Time Order Dose Route Action   01/11/2018 1951 prothrombin complex conc human (KCENTRA) 3,156 Units 3,156 Units Intravenous Given   01/11/2018 2310 desmopressin (DDAVP) 20 mcg in sodium chloride 0 9 % 50 mL IVPB 20 mcg Intravenous New Bag   01/12/2018 0853 allopurinol (ZYLOPRIM) tablet 300 mg 300 mg Oral Given   01/11/2018 2324 atenolol (TENORMIN) tablet 50 mg 50 mg Oral Given   01/12/2018 0853 cholecalciferol (VITAMIN D3) tablet 2,000 Units 2,000 Units Oral Given        Past Medical/Surgical History: Active Ambulatory Problems     Diagnosis Date Noted    No Active Ambulatory Problems     Resolved Ambulatory Problems     Diagnosis Date Noted    No Resolved Ambulatory Problems     Past Medical History:   Diagnosis Date    [facility] )     Alzheimer's dementia     CHF (congestive [facility] failure) ([facility] [facility] )     Gout     Hypertension     Renal disorder      Admitting Diagnosis: Unspecified multiple injuries, initial encounter [T07  XXXA]    Age/Sex: 80 y o  female    Assessment/Plan:   Trauma Alert: Evaluation  Model of Arrival: Ambulance  Trauma Team: Attending Krys Ellis and Residents [facility]  Consultants: None     Trauma Active Problems:   Subdural hemorrhage  Intraparenchymal hemorrhage     Trauma Plan:   Admit HOT protocol  Serial neuro exams  Neurosurgical consultation  [facility] for coumadin reversal     Chief Complaint: My head hurts    Admission Orders:  Scheduled Meds:   allopurinol 300 mg Oral Daily   atenolol 50 mg Oral BID   cholecalciferol 2,000 Units Oral Daily     Continuous Infusions:    PRN Meds:     Observation   Cons Neurosurgery   Diet regular  PT eval/tx  Neurovascular checks q 1 hr x 24 hrs

## 2018-01-12 NOTE — H&P
H&P Exam - Trauma   Gm Abreu 80 y o  female MRN: [id number]  Unit/Bed#: ED 17 Encounter: [id number]    Assessment/Plan   Trauma Alert: Evaluation  Model of Arrival: Ambulance  Trauma Team: Attending Nadia Cerda and Residents Nam Mccain  Consultants: None    Trauma Active Problems:   Subdural hemorrhage  Intraparenchymal hemorrhage    Trauma Plan:   Admit HOT protocol  Serial neuro exams  Neurosurgical consultation  [facility] for coumadin reversal    Chief Complaint: My head hurts    History of Present Illness   HPI:  Gm Abreu is a 80 y o  female who presents after falling out of bed yesterday  She reports falling approximately 3 feet onto her side  Denies loss of consciousness  Family helped her back into bed  Han Brown again today, prompting family to bring her to the hospital  Was seen at [facility], where a CT showed subdural hemorrhage as well as an intraparenchymal hemorrhage  Takes Aspirin, as well as coumadin for Afib  Mechanism:Fall, +LOC:  no    Review of Systems   Constitutional: Negative  HENT: Negative  Eyes: Negative  Respiratory: Negative  Cardiovascular: Negative  Gastrointestinal: Negative  Endocrine: Negative  Genitourinary: Negative  Musculoskeletal: Negative  Skin: Negative  Allergic/Immunologic: Negative  Neurological: Negative  Hematological: Negative  Psychiatric/Behavioral: Negative  Historical Information   History is unobtainable from the patient due to confusion/dementia  Efforts to obtain history included the following sources: family member, obtained from other records    Past Medical History:   Diagnosis Date    A-fib [facility])     Alzheimer's dementia     [facility] [facility]     Gout     Hypertension     Renal disorder      History reviewed  No pertinent surgical history    Social History   History   Alcohol Use No     History   Drug Use No     History   Smoking Status    Never Smoker   Smokeless Tobacco    Never Used       There is no immunization history on file for this patient  Last Tetanus: unknown  Family History: Non-contributory      Meds/Allergies   PTA meds:   Prior to Admission Medications   Prescriptions Last Dose Informant Patient Reported? Taking?    Melatonin-Pyridoxine ER ([facility]) 3-10 MG TBCR 1/11/2018 at Unknown time  Yes Yes   Sig: Take 1 tablet by mouth   allopurinol (ZYLOPRIM) 300 mg tablet 1/11/2018 at Unknown time  Yes Yes   Sig: Take 300 mg by mouth daily   aspirin (ECOTRIN LOW STRENGTH) 81 mg EC tablet 1/11/2018 at Unknown time  Yes Yes   Sig: Take 81 mg by mouth daily   atenolol (TENORMIN) 50 mg tablet 1/11/2018 at Unknown time  Yes Yes   Sig: Take 50 mg by mouth 2 (two) times a day   cholecalciferol (VITAMIN D3) 1,000 units tablet 1/11/2018 at Unknown time  Yes Yes   Sig: Take 2,000 Units by mouth daily   furosemide (LASIX) 40 mg tablet 1/11/2018 at Unknown time  Yes Yes   Sig: Take 40 mg by mouth 2 (two) times a day   potassium chloride (K-DUR,KLOR-CON) 10 mEq tablet 1/11/2018 at Unknown time  Yes Yes   Sig: Take 10 mEq by mouth daily   valsartan-hydrochlorothiazide (DIOVAN-HCT) 320-25 MG per tablet 1/11/2018 at Unknown time  Yes Yes   Sig: Take 1 tablet by mouth daily   warfarin (COUMADIN) 2 mg tablet 1/11/2018 at Unknown time  Yes Yes   Sig: Take 2 mg by mouth daily      Facility-Administered Medications: None       No Known Allergies      PHYSICAL EXAM       Objective   Vitals:   First set: Temperature: 98 7 °F (37 1 °C) (01/11/18 1740)  Pulse: 60 (01/11/18 1740)  Respirations: 20 (01/11/18 1740)  Blood Pressure: (!) 89/56 (01/11/18 1740)    Primary Survey:   (A) Airway: Intact  (B) Breathing: breath sounds bilaterally  (C) Circulation: Pulses:   pedal  2/4, radial  2/4 and femoral  2/4  (D) Disabliity:  Eye Opening:   Spontaneous = 4, Motor Response: Obeys commands = 6 and Verbal Response:  Confused = 4  (E) Expose:  Completed    Secondary Survey: (Click on Physical Exam tab above)  Physical Exam   Constitutional: She appears well-developed and well-nourished  HENT:   Right Ear: External ear normal    Left Ear: External ear normal    Swelling of occipital scalp   Eyes: EOM are normal  Pupils are equal, round, and reactive to light  Neck:   No cervical spine tenderness   Cardiovascular:   Normal rate, irregular   Pulmonary/Chest: Effort normal  No respiratory distress  Abdominal: Soft  She exhibits no distension  There is no tenderness  Musculoskeletal: Normal range of motion  She exhibits no deformity  Mild left lower extremity ecchymosis   Neurological: She is alert  Skin: Skin is warm and dry  Psychiatric: She has a normal mood and affect  Her behavior is normal        Invasive Devices          No matching active lines, drains, or airways          Lab Results:   BMP/CMP:   Lab Results   Component Value Date     01/11/2018    K 3 8 01/11/2018     01/11/2018    CO2 28 01/11/2018    ANIONGAP 9 01/11/2018     (H) 01/11/2018    CREATININE 3 95 (H) 01/11/2018    GLUCOSE 98 01/11/2018    CALCIUM 8 9 01/11/2018    EGFR 10 01/11/2018    and CBC:   Lab Results   Component Value Date    WBC 5 94 01/11/2018    HGB 10 4 (L) 01/11/2018    HCT 31 0 (L) 01/11/2018    MCV 95 01/11/2018     01/11/2018    MCH 31 8 01/11/2018    MCHC 33 5 01/11/2018    RDW 14 6 01/11/2018    MPV 11 6 01/11/2018     Imaging/EKG Studies: Results: I have personally reviewed pertinent reports      Other Studies: n/a    Code Status: Level 3 - DNAR and DNI  Advance Directive and Living Will:      Power of :    POLST:

## 2018-01-13 PROBLEM — E87.6 HYPOKALEMIA: Status: ACTIVE | Noted: 2018-01-13

## 2018-01-13 LAB
ANION GAP SERPL CALCULATED.3IONS-SCNC: 8 MMOL/L (ref 4–13)
BASOPHILS # BLD AUTO: 0.02 THOUSANDS/ΜL (ref 0–0.1)
BASOPHILS NFR BLD AUTO: 0 % (ref 0–1)
BUN SERPL-MCNC: 115 MG/DL (ref 5–25)
CALCIUM SERPL-MCNC: 8.9 MG/DL (ref 8.3–10.1)
CHLORIDE SERPL-SCNC: 109 MMOL/L (ref 100–108)
CO2 SERPL-SCNC: 27 MMOL/L (ref 21–32)
CREAT SERPL-MCNC: 2.79 MG/DL (ref 0.6–1.3)
EOSINOPHIL # BLD AUTO: 0.82 THOUSAND/ΜL (ref 0–0.61)
EOSINOPHIL NFR BLD AUTO: 14 % (ref 0–6)
ERYTHROCYTE [DISTWIDTH] IN BLOOD BY AUTOMATED COUNT: 14.7 % (ref 11.6–15.1)
FOLATE SERPL-MCNC: 9 NG/ML (ref 3.1–17.5)
GFR SERPL CREATININE-BSD FRML MDRD: 15 ML/MIN/1.73SQ M
GLUCOSE SERPL-MCNC: 79 MG/DL (ref 65–140)
HCT VFR BLD AUTO: 29.7 % (ref 34.8–46.1)
HGB BLD-MCNC: 10 G/DL (ref 11.5–15.4)
INR PPP: 1.2 (ref 0.86–1.16)
LYMPHOCYTES # BLD AUTO: 0.71 THOUSANDS/ΜL (ref 0.6–4.47)
LYMPHOCYTES NFR BLD AUTO: 12 % (ref 14–44)
MCH RBC QN AUTO: 32.6 PG (ref 26.8–34.3)
MCHC RBC AUTO-ENTMCNC: 33.7 G/DL (ref 31.4–37.4)
MCV RBC AUTO: 97 FL (ref 82–98)
MONOCYTES # BLD AUTO: 0.45 THOUSAND/ΜL (ref 0.17–1.22)
MONOCYTES NFR BLD AUTO: 8 % (ref 4–12)
NEUTROPHILS # BLD AUTO: 3.74 THOUSANDS/ΜL (ref 1.85–7.62)
NEUTS SEG NFR BLD AUTO: 66 % (ref 43–75)
NRBC BLD AUTO-RTO: 0 /100 WBCS
PLATELET # BLD AUTO: 199 THOUSANDS/UL (ref 149–390)
PMV BLD AUTO: 11.4 FL (ref 8.9–12.7)
POTASSIUM SERPL-SCNC: 3.2 MMOL/L (ref 3.5–5.3)
PROTHROMBIN TIME: 15.3 SECONDS (ref 12.1–14.4)
RBC # BLD AUTO: 3.07 MILLION/UL (ref 3.81–5.12)
SODIUM SERPL-SCNC: 144 MMOL/L (ref 136–145)
TSH SERPL DL<=0.05 MIU/L-ACNC: 4.56 UIU/ML (ref 0.36–3.74)
VIT B12 SERPL-MCNC: 725 PG/ML (ref 100–900)
WBC # BLD AUTO: 5.75 THOUSAND/UL (ref 4.31–10.16)

## 2018-01-13 PROCEDURE — 85610 PROTHROMBIN TIME: CPT | Performed by: PHYSICIAN ASSISTANT

## 2018-01-13 PROCEDURE — G8980 MOBILITY D/C STATUS: HCPCS

## 2018-01-13 PROCEDURE — 80048 BASIC METABOLIC PNL TOTAL CA: CPT | Performed by: PHYSICIAN ASSISTANT

## 2018-01-13 PROCEDURE — 97535 SELF CARE MNGMENT TRAINING: CPT

## 2018-01-13 PROCEDURE — 82746 ASSAY OF FOLIC ACID SERUM: CPT | Performed by: NURSE PRACTITIONER

## 2018-01-13 PROCEDURE — G8979 MOBILITY GOAL STATUS: HCPCS

## 2018-01-13 PROCEDURE — G8988 SELF CARE GOAL STATUS: HCPCS

## 2018-01-13 PROCEDURE — 97163 PT EVAL HIGH COMPLEX 45 MIN: CPT

## 2018-01-13 PROCEDURE — 82607 VITAMIN B-12: CPT | Performed by: NURSE PRACTITIONER

## 2018-01-13 PROCEDURE — 97167 OT EVAL HIGH COMPLEX 60 MIN: CPT

## 2018-01-13 PROCEDURE — 84443 ASSAY THYROID STIM HORMONE: CPT | Performed by: NURSE PRACTITIONER

## 2018-01-13 PROCEDURE — 85025 COMPLETE CBC W/AUTO DIFF WBC: CPT | Performed by: PHYSICIAN ASSISTANT

## 2018-01-13 PROCEDURE — G8987 SELF CARE CURRENT STATUS: HCPCS

## 2018-01-13 RX ORDER — MAGNESIUM SULFATE HEPTAHYDRATE 40 MG/ML
2 INJECTION, SOLUTION INTRAVENOUS ONCE
Status: COMPLETED | OUTPATIENT
Start: 2018-01-13 | End: 2018-01-13

## 2018-01-13 RX ORDER — POTASSIUM CHLORIDE 20 MEQ/1
40 TABLET, EXTENDED RELEASE ORAL ONCE
Status: COMPLETED | OUTPATIENT
Start: 2018-01-13 | End: 2018-01-13

## 2018-01-13 RX ADMIN — MAGNESIUM SULFATE HEPTAHYDRATE 2 G: 40 INJECTION, SOLUTION INTRAVENOUS at 09:59

## 2018-01-13 RX ADMIN — POTASSIUM CHLORIDE 40 MEQ: 1500 TABLET, EXTENDED RELEASE ORAL at 09:33

## 2018-01-13 RX ADMIN — ACETAMINOPHEN 650 MG: 325 TABLET, FILM COATED ORAL at 14:43

## 2018-01-13 RX ADMIN — SODIUM CHLORIDE 50 ML/HR: 0.9 INJECTION, SOLUTION INTRAVENOUS at 11:30

## 2018-01-13 RX ADMIN — ALLOPURINOL 300 MG: 300 TABLET ORAL at 09:34

## 2018-01-13 RX ADMIN — ATENOLOL 50 MG: 50 TABLET ORAL at 09:33

## 2018-01-13 RX ADMIN — VITAMIN D, TAB 1000IU (100/BT) 2000 UNITS: 25 TAB at 09:33

## 2018-01-13 RX ADMIN — SODIUM CHLORIDE 50 ML/HR: 0.9 INJECTION, SOLUTION INTRAVENOUS at 23:07

## 2018-01-13 RX ADMIN — ACETAMINOPHEN 650 MG: 325 TABLET, FILM COATED ORAL at 06:25

## 2018-01-13 RX ADMIN — ACETAMINOPHEN 650 MG: 325 TABLET, FILM COATED ORAL at 21:27

## 2018-01-13 NOTE — PHYSICAL THERAPY NOTE
Physical Therapy Evaluation    Patient's Name:Morena Patino    Today's Date:01/13/18    Patient Active Problem List   Diagnosis    [facility]ural hemorrhage ([facility] )    Alzheimer disease    CHF (congestive heart failure) ([facility] )    HTN (hypertension)    Atrial fibrillation (HCC)    ANABEL [facility]acute [facility] injury) ([facility] )    Coagulopathy ([facility] )    Intraparenchymal hematoma of brain due to trauma ([facility] )    SAH (subarachnoid hemorrhage) ([place] [address] )    Fall from bed    Hypokalemia       Past Medical History:   Diagnosis Date    A-fib ([place] [address] )     Alzheimer's dementia     CHF (congestive heart failure) ([place] [address] )     Gout     Hypertension     Renal disorder        History reviewed  No pertinent surgical history  01/13/18 1550   Note Type   Note type Eval only   Pain Assessment   Pain Assessment 0-10   Pain Score No Pain   Hospital Pain Intervention(s) Ambulation/increased activity;Repositioned   Response to Interventions unchanged   Home Living   Type of [address] [place] [address] Two level; Able to live on main level with bedroom/bathroom  (1 JULIO CESAR)   Prior Function   Level of [place] Independent with ADLs and functional mobility   Lives With Alone   Falls in the last 6 months 1 to 4   Restrictions/Precautions   Weight Bearing Precautions Per Order No   Other Precautions Cognitive; Chair Alarm;Multiple lines; Fall Risk   General   Family/Caregiver Present No   Cognition   Overall Cognitive Status Impaired   Arousal/Participation Alert   Orientation Level Oriented to person   Following Commands Follows one step commands without difficulty   RUE Assessment   RUE Assessment (func reach grasp and propulsion of RW)   LUE Assessment   LUE Assessment (func reach grasp and propulsion of RW)   Strength RLE   RLE Overall Strength 3+/5   Strength LLE   LLE Overall Strength 3+/5   Coordination   Movements are Fluid and Coordinated 0   Coordination and Movement Description weakness   Bed Mobility   Additional Comments pt up OOB in chair upon PT arrival   Transfers   Sit to Stand 4  Minimal assistance   Additional items Assist x 1; Increased time required;Verbal cues   Stand to Sit 4  Minimal assistance   Additional items Assist x 1; Increased time required;Verbal cues   Stand pivot 4  Minimal assistance   Additional items Assist x 1; Increased time required;Verbal cues   Ambulation/Elevation   Gait pattern Forward Flexion; Improper Weight shift; Short stride; Excessively slow; Step to   Gait Assistance 4  Minimal assist   Additional items Assist x 1;Verbal cues   Assistive Device Rolling walker   Distance 20 ft   Balance   Static Sitting Fair +   Dynamic Sitting Fair   Static Standing Fair -   Dynamic Standing Poor +   Ambulatory Poor +   Endurance Deficit   Endurance Deficit Yes   Endurance Deficit Description fatigue/weakness   Activity Tolerance   Activity Tolerance Patient tolerated treatment well   Nurse Made Aware JUANITO Roy   Assessment   Prognosis Good   Problem List Decreased strength;Decreased endurance; Impaired balance;Decreased mobility; Decreased coordination;Decreased cognition; Impaired judgement;Decreased safety awareness;Pain   Assessment Yaa Sanchez is a 80 y o  female who [facility] s/p fall causing SDH  Pt has PMH of Alzheimer's Disease, CHF, A-Fib, Renal disorder, HTN  Pt factors affecting complexity include cont pulse ox, regression from baseline mobility, falls risk, abnormal labs  Upon PT eval, pt impairments include decreased strength, decreased balance, decreased mobility, decreased cognition, decreased safety awareness, and increased pain  Pt was min A to perform sit-stand and amb 20 ft using RW ; short stride, slow, forward flexed  PT donned pt in brief due to incontinence on 1st sit-stand trial  Pt reported prior level of func as independent w/ ADLs and func mobility; states she does not use any AD   Pt lives alone in 2 story home w/ 1st floor setup and 1 JULIO CESAR   PT rec is IP rehab upon DC from hospital    Barriers to Discharge Inaccessible home environment;Decreased caregiver support   Goals   Patient Goals none stated   STG Expiration Date 01/23/18   Short Term Goal #1 1  Modified Independent with Bed Mobility Rolling Right and Left     2  Modified Independent with Bed Mobility Supine-Sit     3  Modified Independent with Transfer Bed-Chair     4  Increase Dynamic Sitting Balance at least 1 Grade for improved stability with functional reach activities     5  Increase Dynamic Standing Balance at least 1 Grade for improved ease with Activities of Daily Living     6  Increase Lower Extremity Strength at least 1 Grade for improved ease mobility tasks     7  Supervision with  Ambulation 100 feet using RW to facilitate home and community mobility 8  Minimum assist with Ascending/Descending 2 steps to facilitate home and community accessibility  Plan   Treatment/Interventions Functional transfer training;LE strengthening/ROM; Elevations; Therapeutic exercise; Endurance training;Patient/family training;Equipment eval/education; Bed mobility;Gait training; Compensatory technique education;Continued evaluation;Spoke to nursing   PT Frequency (6x/wk)   Recommendation   Recommendation Post acute IP rehab   Equipment Recommended Walker   PT - OK to Discharge Yes   Barthel Index   Feeding 5   Bathing 0   Grooming Score 5   Dressing Score 5   Bladder Score 5   Bowels Score 10   Toilet Use Score 5   Transfers (Bed/Chair) Score 10   Mobility (Level Surface) Score 0   Stairs Score 0   Barthel Index Score 45

## 2018-01-13 NOTE — PLAN OF CARE
Problem: DISCHARGE PLANNING - CARE MANAGEMENT  Goal: Discharge to post-acute care or home with appropriate resources  INTERVENTIONS:  - Conduct assessment to determine patient/family and health care team treatment goals, and need for post-acute services based on payer coverage, community resources, and patient preferences, and barriers to discharge  - Address psychosocial, clinical, and financial barriers to discharge as identified in assessment in conjunction with the patient/family and health care team  - Arrange appropriate level of post-acute services according to patient's   needs and preference and payer coverage in collaboration with the physician and health care team  - Communicate with and update the patient/family, physician, and health care team regarding progress on the discharge plan  - Arrange appropriate transportation to post-acute venues  - CM will assist with coordinating the Pt's next recommended level of care upon d/c  Outcome: Progressing  - CM will monitor for the Pt's d/c recommendations

## 2018-01-13 NOTE — SOCIAL WORK
CM met with Pt, her daughter/POA Inge Singer and son-n-law Katy Clifton with an introduction and explanation of role  Inge Singer reported the Pt resides with her in a bilevel home with a roller walker and 6 steps to enter  Inge Singer reported the Pt is independent with ADLs with reminders, is open to [facility] and was a SNF in the past  Inge Singer denied any mental health or drug/alcohol placements for the Pt, and reported she uses [facility] in [place]  Inge Singer reported being agreeable to SNF is recommended and is requesting a referral to [facility]  CM made a [address] referral to [facility] for resumption of care  CM reviewed d/c planning process including the following: identifying help at home, patient preference for d/c planning needs, Discharge Lounge, [facility] Meds to Bed program, availability of treatment team to discuss questions or concerns patient and/or family may have regarding understanding medications and recognizing signs and symptoms once discharged  CM also encouraged patient to follow up with all recommended appointments after discharge  Patient advised of importance for patient and family to participate in managing patients medical well being

## 2018-01-13 NOTE — PLAN OF CARE
Problem: PHYSICAL THERAPY ADULT  Goal: Performs mobility at highest level of function for planned discharge setting  See evaluation for individualized goals  Treatment/Interventions: Functional transfer training, LE strengthening/ROM, Elevations, Therapeutic exercise, Endurance training, Patient/family training, Equipment eval/education, Bed mobility, Gait training, Compensatory technique education, Continued evaluation, Spoke to nursing  Equipment Recommended: Kecia Seaman       See flowsheet documentation for full assessment, interventions and recommendations  Prognosis: Good  Problem List: Decreased strength, Decreased endurance, Impaired balance, Decreased mobility, Decreased coordination, Decreased cognition, Impaired judgement, Decreased safety awareness, Pain  Assessment: Eliza Weaver is a 80 y o  female who presents to [facility] s/p fall causing SDH  Pt has PMH of Alzheimer's Disease, CHF, A-Fib, Renal disorder, HTN  Pt factors affecting complexity include cont pulse ox, regression from baseline mobility, falls risk, abnormal labs  Upon PT eval, pt impairments include decreased strength, decreased balance, decreased mobility, decreased cognition, decreased safety awareness, and increased pain  Pt was min A to perform sit-stand and amb 20 ft using RW ; short stride, slow, forward flexed  PT donned pt in brief due to incontinence on 1st sit-stand trial  Pt reported prior level of func as independent w/ ADLs and func mobility; states she does not use any AD   Pt lives alone in 2 story home w/ 1st floor setup and 1 JULIO CESAR  PT rec is IP rehab upon DC from hospital   Barriers to Discharge: Inaccessible home environment, Decreased caregiver support     Recommendation: Post acute IP rehab     PT - OK to Discharge: Yes    See flowsheet documentation for full assessment

## 2018-01-13 NOTE — OCCUPATIONAL THERAPY NOTE
[address] Evaluation     Patient Name: Oc Nielson  ERHHD'N Date: 1/13/2018  Problem List  Patient Active Problem List   Diagnosis    [facility]    Alzheimer disease    [facility] )    HTN (hypertension)    [facility] fibrillation [facility] )    ANABEL [facility]acute kidney injury) ([facility] )    Coagulopathy ([facility] )    Intraparenchymal hematoma of brain due to trauma ([facility] )    SAH (subarachnoid hemorrhage) ([facility] )    Fall from bed    Hypokalemia     Past Medical History  Past Medical History:   Diagnosis Date    A-fib [facility])     Alzheimer's dementia     [facility]congestive heart failure) ([facility] )     Gout     Hypertension     Renal disorder       01/13/18 1500   Note Type   Note type Eval/Treat   Restrictions/Precautions   Weight Bearing Precautions Per Order No   Other Precautions Cognitive; Chair Alarm;Multiple lines; Fall Risk;Hard of hearing   Pain Assessment   Pain Assessment No/denies pain   Home Living   Type of [address] [place] [address] Two level;Stairs to enter with rails; Able to live on main level with bedroom/bathroom   Bathroom Shower/Tub Tub/shower unit   Bathroom Toilet Standard   Bathroom Equipment Grab bars in shower; Shower chair;Grab bars around [employer] Walker;Grab bars   Additional Comments Pt lives with daughter and son-in-law in a 2 story home with 6 JULIO CESAR with B/L Rails  Pt bedroom/bathroom located on one floor  Pt bathroom previously assessed by home therapy and includes shower chair, grab bars, standard toilet and grab bars around toilet  Prior Function   Level of [place] Needs assistance with IADLs; Needs assistance with ADLs and functional mobility   Lives With Daughter  (son-in law )   Receives Help From Family   ADL Assistance Needs assistance   IADLs Needs assistance   Falls in the last 6 months 1 to 4  (2x)   Vocational Unemployed   Comments Pt lives with daughter and son in law since November 2017  Pt daughter reports pt required 24 hour assistance for safety and self care due to Alzheimer's  Daughter reports a home evaluation was completed and was had just concluding home PT  Lifestyle   Autonomy At baseline, pt requires assistance for ADLs, IADLs, and functional mobility using a RW  Daughter reports pt requires cues for functional mobility/use of RW and physical assistance for dressing/bathing  Reciprocal Relationships Pt lives with daughter and son-in law    Service to Others 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, 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    Intrinsic Gratification Pt enjoys music, gardening, and being active  Psychosocial   Psychosocial (WDL) WDL   Patient Behaviors/Mood Calm; Cooperative   Subjective   Subjective "There are bugs on the table"    ADL   Where Assessed Wheelchair   Eating Assistance 5  Supervision/Setup   Grooming Assistance 5  Supervision/Setup   UB Bathing Assistance 5  Supervision/Setup   LB Bathing Assistance 3  Moderate Assistance   UB Dressing Assistance 4  Minimal [address] 3  Moderate [address]  3  Moderate Assistance   Functional Assistance 3  Moderate Assistance   Functional Deficit Setup;Steadying;Verbal cueing;Supervision/safety; Increased time to complete   Additional Comments Pt requires Mod A for ADLs due to limited functional forward reach, limtied B/L LE AROM, impaired balance, decreased endurance, and impaired cognition  Bed Mobility   Supine to Sit Unable to assess   Additional Comments Pt in chair up arrival, in chair at end of session    Transfers   Sit to Stand 3  Moderate assistance   Additional items Assist x 1; Increased time required;Verbal cues;Armrests   Stand to Sit 4  Minimal assistance   Additional items Assist x 1; Increased time required;Verbal cues;Armrests   Additional Comments Pt requires Mod A using RW for functional transfers for force production, impaired balance, sequencing, safety and line management  Functional Mobility   Functional Mobility 4  Minimal assistance   Additional Comments Pt requires Min A using RW for functional mobility short in room distance, assist x2 for line managment  Pt demonstrates impaired cognition and required cues for sequencing, problem sovling, and safety and impaired balance  Balance   Static Sitting Fair +   Dynamic Sitting Fair   Static Standing Fair -   Dynamic Standing Poor +   Ambulatory Poor +   Activity Tolerance   Activity Tolerance Patient tolerated treatment well   Nurse Made Aware Pt appropriate to see per RN Sheldon Bowels    RUE Assessment   RUE Assessment WFL   LUE Assessment   LUE Assessment WFL   Hand Function   Gross Motor Coordination Functional   Fine Motor Coordination Functional   Vision-Basic Assessment   Current Vision Wears glasses all the time   Patient Visual Report (Pt reported there were bugs on her tray but no blurring/dizz)   Vision - Complex Assessment   Acuity Able to read employee name badge without difficulty  (w/ other text eliminated )   Cognition   Overall Cognitive Status Impaired   Arousal/Participation Alert; Cooperative   Attention Attends with cues to redirect   Orientation Level Oriented to person;Disoriented to place; Disoriented to time;Disoriented to situation   Memory Decreased long term memory;Decreased recall of biographical information;Decreased short term memory;Decreased recall of recent events   Following Commands Follows one step commands with increased time or repetition   Comments Pt alert and cooperative, disoriented to time, place situation  Pt h/o of baseline dementia  Pt unable to recall daughter/son- in-law or previous living environment  Pt recalled month of her birthday only  Pt requires simple one step commands t/o therapy  Daughter reports pt is at baseline cognition  Daughter reports concerns with safety as pt needs cues to use RW prior to functional mobility and consistently wanders during the night (3am)   Pt interested in long term care to provide 24 hour care for dementia  Assessment   Limitation Decreased ADL status; Decreased UE strength;Decreased Safe judgement during ADL;Decreased cognition;Decreased self-care trans; Visual deficit; Decreased endurance;Decreased high-level ADLs   Prognosis Fair   Assessment Pt is a 80 y o  female who was admitted to [facility] on 1/11/18 as a trauma following 2 falls at home, one out of bed, -LOC  Pt's dx include: trauma subarachnoid hemorrhage, intraparenchymal hematoma of brain due to trauma, SAH, A-Fib, and Alzheimer disease  Pt PMH includes: A-fib, Alzheimer's dementia, CHF, Gout, HTN, Renal disorder  At baseline pt requires assistance for ADLs/IADLs and SBA w/ RW for functional mobility/transfers  Pt lives with daughter and son in law in a 2 story home with 6 JUL[address] CESAR w/ B/L rails, 1st floor setup  Bathroom setup includes tub shower unit with shower chair/grab bar, standard toilet with grab bars, and a commode  Pt daughter is retired and primary caretaker for pt  Daughter reports 24 hour assistance, pt required Min A for ADLs and significant cues for safety during functional mobility and ADLs  Pt currently presents with multiple lines, fall risk, impaired cognition, and decreased ADL status  Currently pt completes functional mobility/transfers with Mod A using RW and ADLs within room with Mod A  Pt currently demonstrates impairments in the following areas: impaired cognition/sequencing/problem solving/safety, decreased ADL status, impaired balance, decreased activity tolerance/endurance, impaired vision, and generalized weakness  These impairments, as well as pt's baseline dementia and fall risk limit pt's ability to safely engage in all baseline areas of occupation, including grooming, bathing, dressing, toileting, functional mobility/transfers,  Caring for others, community mobility, rest/sleep, leisure, social participation   Recommend Short Term Rehab to maximize functional performance in ADLs and minimize caregiver support for long term care  From OT standpoint, pt will  benefit from continued OT services to address the following goals 3-5x/wk to  w/in 7-10 days  Goals   Patient Goals None stated    Plan   Treatment Interventions ADL retraining;Visual perceptual retraining;Functional transfer training;UE strengthening/ROM; Cognitive reorientation;Patient/family training;Equipment evaluation/education; Energy conservation; Activityengagement   Goal Expiration Date 18   Treatment Day 1   OT Frequency 3-5x/wk   Additional Treatment Session   Start Time 1506   End Time 1522   Treatment Assessment Following OT evaluation, pt and family provided caregiver/pt education  Extensive time spent with pt daughter and son-in law addressing family questions/concerns with plan of care  Family educated on pt present functional performance in functional mobility/transfers and ADL tasks  Daughter denies pt requiring physical assistance for sit to/from stand transfers PTA  Educated family on OT/PT/CM role in hospital setting and clinical recommendations/continum of care  Family requesting information about long term care facilities/options due to report of 24 hours supervision needed  At this time, from OT standpoint, recommending STR to maximize independence, performance and safety in ADLs and functional mobility in order to safety discharge to least restrictive environment  Pt would benefit from continued skilled OT to address goals established upon evaluation     Recommendation   Recommendation Geriatric Consult   OT Discharge Recommendation Short Term Rehab   OT - OK to Discharge Yes  (To STR when medically stable )   Barthel Index   Feeding 5   Bathing 0   Grooming Score 5   Dressing Score 5   Bladder Score 10   Bowels Score 10   Toilet Use Score 5   Transfers (Bed/Chair) Score 5   Mobility (Level Surface) Score 0   Stairs Score 0  (Did not assess)   Barthel Index Score 45   Modified Kimberly Scale   Modified Kimberly Scale 4     Goals: 1  Pt will complete UB/LB ADLS with Min A  2  Pt will complete toileting, including thorough hygiene,with Min A and use of DME PRN  3  Pt will complete bed mobility and transfer to EOB with Min A  to increase bed mobility in preparation for EOB activities  4  Pt will complete functional transfers on/off all surfaces, including toilet/commode, with SBA to increase participation in ADLS/IADLS  5  Pt will complete light grooming task while standing at sink for 3-5 minutes with Min A and good balance, DME PRN  6  Pt will increase activity tolerance to 15 minutes during OT tx session to increase endurance during all functional tasks  7  Pt will increase standing balance to good for 15 min while engaging in ADLS  8  Pt will complete ADLs while following 100% of 1 step commands      Urmila Vargas MS , OTR/L

## 2018-01-13 NOTE — PLAN OF CARE
DISCHARGE PLANNING     Discharge to home or other facility with appropriate resources Progressing        Knowledge Deficit     Patient/family/caregiver demonstrates understanding of disease process, treatment plan, medications, and discharge instructions Progressing        PAIN - ADULT     Verbalizes/displays adequate comfort level or baseline comfort level Progressing        Potential for Falls     Patient will remain free of falls Progressing        Prexisting or High Potential for Compromised Skin Integrity     Skin integrity is maintained or improved Progressing        SAFETY ADULT     Maintain or return to baseline ADL function Progressing     Maintain or return mobility status to optimal level [address] Discharge to home or other facility with appropriate resources Progressing        Knowledge Deficit     Patient/family/caregiver demonstrates understanding of disease process, treatment plan, medications, and discharge instructions Progressing        PAIN - ADULT     Verbalizes/displays adequate comfort level or baseline comfort level Progressing        Potential for Falls     Patient will remain free of falls Progressing        Prexisting or High Potential for Compromised Skin Integrity     Skin integrity is maintained or improved Progressing        SAFETY ADULT     Maintain or return to baseline ADL function Progressing     Maintain or return mobility status to optimal level Progressing

## 2018-01-13 NOTE — PROGRESS NOTES
Progress Note - Joey Obrien 11/23/1931, 80 y o  female MRN: [id number]    [facility] Encounter: [id number]    Primary Care Provider: No primary care provider on file  Date and time admitted to hospital: 1/11/2018  5:34 PM    79 y/o female s/p fall out of bed    Hypokalemia   Assessment & Plan    - replete today and re-check in AM        Fall from bed   Assessment & Plan    - PT/OT pending        SAH (subarachnoid hemorrhage) (HCC)   Assessment & Plan    - Repeat CT head stable yesterday  - Received 10 mg vitamin K, FFP, DDAVP and KCentra  INR stable at 1 2 s/p reversal    - Hold all AC/AP  - Defer CTA at this time, as the etiology likely traumatic per Neurosurgery  - continue neuro checks q4h, can d/c HOT protocol  - No seizure ppx; per neurosurgery    - PT/OT pending today           Intraparenchymal hematoma of brain due to trauma [facility])   Assessment & Plan    - repeat imaging on 1/12 stable, no plans for further re-imaging  - GCS 15, non-focal exam  - received FFP, Vit K, K centra and DDAVP  - INR 1 2        * Subdural hemorrhage (HCC)   Assessment & Plan    - Repeat CT head stable yesterday  - Received 10 mg vitamin K, FFP, DDAVP and KCentra  INR stable at 1 2 s/p reversal    - Hold all AC/AP  - continue neuro checks q4h, can d/c HOT protocol  - No seizure ppx; per neurosurgery    - PT/OT pending today           Coagulopathy ([facility])   Assessment & Plan    - s/p reversal as above  - continue to hold ASA and coumadin        ANABEL (acute kidney injury) ([facility])   Assessment & Plan    - slowly improving, continue gentle IV hydration  - Monitor I & Os  - repeat BMP in AM  - unclear baseline, no other charts for patient in system and no prior BMP        Atrial fibrillation (HCC)   Assessment & Plan    - Rate controlled  Continue atenolol 50 mg b i d    - Hold Coumadin in setting of ICH        CHF (congestive heart failure) ([facility])   Assessment & Plan    - no evidence of acute exacerbation  - hold lasix an additional day due to low SBP prior to atenolol and need for IVF hydration due to ANABEL          HTN (hypertension)   Assessment & Plan    - Hold home antihypertensives in setting of low blood pressures  Alzheimer disease   Assessment & Plan    - Appears at baseline, oriented to self, location, and situation  - Frequent reorienting/redirection   - Attempt to regulate sleep-wake cycles  - Appreciate geriatric consultation  Chief Complaint: "I am feeling well"    Subjective: Patient denies headache, dizziness, nausea  She ate her breakfast and feels well overall  Objective:     Meds/Allergies     Current Facility-Administered Medications:     acetaminophen (TYLENOL) tablet 650 mg, 650 mg, Oral, Q8H [facility], Divine Sayda, , 650 mg at 01/13/18 9817    allopurinol (ZYLOPRIM) tablet 300 mg, 300 mg, Oral, Daily, Christy Hanson MD, 300 mg at 01/13/18 0934    atenolol (TENORMIN) tablet 50 mg, 50 mg, Oral, BID, Christy Hanson MD, 50 mg at 01/13/18 4121    cholecalciferol (VITAMIN D3) tablet 2,000 Units, 2,000 Units, Oral, Daily, Christy Hanson MD, 2,000 Units at 01/13/18 0933    magnesium sulfate 2 g/50 mL IVPB (premix) 2 g, 2 g, Intravenous, Once, Dean Wolf PA-C    sodium chloride 0 9 % infusion, 50 mL/hr, Intravenous, Continuous, Manuel Huitron PA-C, Last Rate: 50 mL/hr at 01/12/18 1620, 50 mL/hr at 01/12/18 1620    Vitals: Blood pressure 109/63, pulse 81, temperature 98 °F (36 7 °C), temperature source Oral, resp  rate 18, height 5' (1 524 m), weight 81 6 kg (180 lb), SpO2 97 %  Body mass index is 35 15 kg/m²   SpO2: SpO2: 97 %, SpO2 Device: O2 Device: None (Room air)    ABG: No results found for: PHART, OAG3ITS, PO2ART, SEG4TIU, D3YSWDPX, BEART, SOURCE      Intake/Output Summary (Last 24 hours) at 01/13/18 0958  Last data filed at 01/13/18 0935   Gross per 24 hour   Intake              420 ml   Output              230 ml   Net              190 ml       Invasive Devices          No matching active lines, drains, or airways                    Nutrition/GI Proph/Bowel Reg: Regular diet    Physical Exam:   GENERAL APPEARANCE: NAD  HEENT: NCAT  CV: RRR, no MGR  LUNGS: CTA bilaterally  ABD: soft, non-tender, non-distended  EXT: moving all equally  NEURO: GCS 15, non-focal exam, 5/5 strength B/L UE/LE  SKIN: pink, warm, dry      Lab Results:   BMP/CMP:   Lab Results   Component Value Date     01/13/2018    K 3 2 (L) 01/13/2018     (H) 01/13/2018    CO2 27 01/13/2018    ANIONGAP 8 01/13/2018     (H) 01/13/2018    CREATININE 2 79 (H) 01/13/2018    GLUCOSE 79 01/13/2018    CALCIUM 8 9 01/13/2018    EGFR 15 01/13/2018    and CBC:   Lab Results   Component Value Date    WBC 5 75 01/13/2018    HGB 10 0 (L) 01/13/2018    HCT 29 7 (L) 01/13/2018    MCV 97 01/13/2018     01/13/2018    MCH 32 6 01/13/2018    MCHC 33 7 01/13/2018    RDW 14 7 01/13/2018    MPV 11 4 01/13/2018    NRBC 0 01/13/2018     Imaging/EKG Studies: CT Scan Head: scatter multi compartmental ICH with anterior inferior interhemisferic SDH  Small ri ght frontal pernchymal hematoma with adjacent SAH  Given the rounded configuration and intimacy associated with the region of the [place], underlying aneuryms that  has rupture is a differential diagnosis   Consider CTA of the brain for further characterization     Other Studies: no new  VTE Prophylaxis: SCDs

## 2018-01-13 NOTE — PLAN OF CARE
Problem: OCCUPATIONAL THERAPY ADULT  Goal: Performs self-care activities at highest level of function for planned discharge setting  See evaluation for individualized goals  Treatment Interventions: ADL retraining, Visual perceptual retraining, Functional transfer training, UE strengthening/ROM, Cognitive reorientation, Patient/family training, Equipment evaluation/education, Energy conservation, Activityengagement          See flowsheet documentation for full assessment, interventions and recommendations  Limitation: Decreased ADL status, Decreased UE strength, Decreased Safe judgement during ADL, Decreased cognition, Decreased self-care trans, Visual deficit, Decreased endurance, Decreased high-level ADLs  Prognosis: Fair  Assessment: Pt is a 80 y o  female who was admitted to [facility] on 1/11/18 as a trauma following 2 falls at home, one out of bed, -LOC  Pt's dx include: trauma subarachnoid hemorrhage, intraparenchymal hematoma of brain due to trauma, SAH, A-Fib, and Alzheimer disease  Pt PMH includes: A-fib, Alzheimer's dementia, CHF, Gout, HTN, Renal disorder  At baseline pt requires assistance for ADLs/IADLs and SBA w/ RW for functional mobility/transfers  Pt lives with daughter and son in law in a 2 story home with 6 JULIO CESAR w/ B/L rails, 1st floor setup  Bathroom setup includes tub shower unit with shower chair/grab bar, standard toilet with grab bars, and a commode  Pt daughter is retired and primary caretaker for pt  Daughter reports 24 hour assistance, pt required Min A for ADLs and significant cues for safety during functional mobility and ADLs  Pt currently presents with multiple lines, fall risk, impaired cognition, and decreased ADL status  Currently pt completes functional mobility/transfers with Mod A using RW and ADLs within room with Mod A   Pt currently demonstrates impairments in the following areas: impaired cognition/sequencing/problem solving/safety, decreased ADL status, impaired balance, decreased activity tolerance/endurance, impaired vision, and generalized weakness  These impairments, as well as pt's baseline dementia and fall risk limit pt's ability to safely engage in all baseline areas of occupation, including grooming, bathing, dressing, toileting, functional mobility/transfers,  Caring for others, community mobility, rest/sleep, leisure, social participation  Recommend Short Term Rehab to maximize functional performance in ADLs and minimize caregiver support for long term care  From OT standpoint, pt will  benefit from continued OT services to address the following goals 3-5x/wk to  w/in 7-10 days     Recommendation: Geriatric Consult  OT Discharge Recommendation: Short Term Rehab  OT - OK to Discharge: Yes (To STR when medically stable )    Phillip Baez MS , OTR/L

## 2018-01-13 NOTE — ASSESSMENT & PLAN NOTE
- repeat imaging on 1/12 stable, no plans for further re-imaging  - GCS 15, non-focal exam  - received FFP, Vit K, K centra and DDAVP  - INR 1 2

## 2018-01-14 PROBLEM — E87.6 HYPOKALEMIA: Status: RESOLVED | Noted: 2018-01-13 | Resolved: 2018-01-14

## 2018-01-14 LAB
ANION GAP SERPL CALCULATED.3IONS-SCNC: 7 MMOL/L (ref 4–13)
BASOPHILS # BLD AUTO: 0.03 THOUSANDS/ΜL (ref 0–0.1)
BASOPHILS NFR BLD AUTO: 0 % (ref 0–1)
BUN SERPL-MCNC: 104 MG/DL (ref 5–25)
CALCIUM SERPL-MCNC: 9.5 MG/DL (ref 8.3–10.1)
CHLORIDE SERPL-SCNC: 110 MMOL/L (ref 100–108)
CO2 SERPL-SCNC: 29 MMOL/L (ref 21–32)
CREAT SERPL-MCNC: 2.42 MG/DL (ref 0.6–1.3)
EOSINOPHIL # BLD AUTO: 0.9 THOUSAND/ΜL (ref 0–0.61)
EOSINOPHIL NFR BLD AUTO: 13 % (ref 0–6)
ERYTHROCYTE [DISTWIDTH] IN BLOOD BY AUTOMATED COUNT: 14.7 % (ref 11.6–15.1)
GFR SERPL CREATININE-BSD FRML MDRD: 18 ML/MIN/1.73SQ M
GLUCOSE SERPL-MCNC: 90 MG/DL (ref 65–140)
HCT VFR BLD AUTO: 32 % (ref 34.8–46.1)
HGB BLD-MCNC: 10.4 G/DL (ref 11.5–15.4)
LYMPHOCYTES # BLD AUTO: 0.73 THOUSANDS/ΜL (ref 0.6–4.47)
LYMPHOCYTES NFR BLD AUTO: 11 % (ref 14–44)
MAGNESIUM SERPL-MCNC: 2.4 MG/DL (ref 1.6–2.6)
MCH RBC QN AUTO: 31.5 PG (ref 26.8–34.3)
MCHC RBC AUTO-ENTMCNC: 32.5 G/DL (ref 31.4–37.4)
MCV RBC AUTO: 97 FL (ref 82–98)
MONOCYTES # BLD AUTO: 0.49 THOUSAND/ΜL (ref 0.17–1.22)
MONOCYTES NFR BLD AUTO: 7 % (ref 4–12)
NEUTROPHILS # BLD AUTO: 4.67 THOUSANDS/ΜL (ref 1.85–7.62)
NEUTS SEG NFR BLD AUTO: 69 % (ref 43–75)
NRBC BLD AUTO-RTO: 0 /100 WBCS
PLATELET # BLD AUTO: 231 THOUSANDS/UL (ref 149–390)
PMV BLD AUTO: 11.7 FL (ref 8.9–12.7)
POTASSIUM SERPL-SCNC: 3.8 MMOL/L (ref 3.5–5.3)
RBC # BLD AUTO: 3.3 MILLION/UL (ref 3.81–5.12)
SODIUM SERPL-SCNC: 146 MMOL/L (ref 136–145)
WBC # BLD AUTO: 6.84 THOUSAND/UL (ref 4.31–10.16)

## 2018-01-14 PROCEDURE — 85025 COMPLETE CBC W/AUTO DIFF WBC: CPT | Performed by: PHYSICIAN ASSISTANT

## 2018-01-14 PROCEDURE — 80048 BASIC METABOLIC PNL TOTAL CA: CPT | Performed by: PHYSICIAN ASSISTANT

## 2018-01-14 PROCEDURE — 83735 ASSAY OF MAGNESIUM: CPT | Performed by: PHYSICIAN ASSISTANT

## 2018-01-14 RX ORDER — HEPARIN SODIUM 5000 [USP'U]/ML
5000 INJECTION, SOLUTION INTRAVENOUS; SUBCUTANEOUS EVERY 8 HOURS SCHEDULED
Status: DISCONTINUED | OUTPATIENT
Start: 2018-01-14 | End: 2018-01-16 | Stop reason: HOSPADM

## 2018-01-14 RX ORDER — SODIUM CHLORIDE, SODIUM LACTATE, POTASSIUM CHLORIDE, CALCIUM CHLORIDE 600; 310; 30; 20 MG/100ML; MG/100ML; MG/100ML; MG/100ML
50 INJECTION, SOLUTION INTRAVENOUS CONTINUOUS
Status: DISCONTINUED | OUTPATIENT
Start: 2018-01-14 | End: 2018-01-16 | Stop reason: HOSPADM

## 2018-01-14 RX ADMIN — ATENOLOL 50 MG: 50 TABLET ORAL at 09:58

## 2018-01-14 RX ADMIN — SODIUM CHLORIDE, SODIUM LACTATE, POTASSIUM CHLORIDE, AND CALCIUM CHLORIDE 50 ML/HR: .6; .31; .03; .02 INJECTION, SOLUTION INTRAVENOUS at 22:30

## 2018-01-14 RX ADMIN — HEPARIN SODIUM 5000 UNITS: 5000 INJECTION, SOLUTION INTRAVENOUS; SUBCUTANEOUS at 23:05

## 2018-01-14 RX ADMIN — ACETAMINOPHEN 650 MG: 325 TABLET, FILM COATED ORAL at 13:23

## 2018-01-14 RX ADMIN — HEPARIN SODIUM 5000 UNITS: 5000 INJECTION, SOLUTION INTRAVENOUS; SUBCUTANEOUS at 11:35

## 2018-01-14 RX ADMIN — ACETAMINOPHEN 650 MG: 325 TABLET, FILM COATED ORAL at 23:05

## 2018-01-14 RX ADMIN — VITAMIN D, TAB 1000IU (100/BT) 2000 UNITS: 25 TAB at 09:58

## 2018-01-14 RX ADMIN — SODIUM CHLORIDE, SODIUM LACTATE, POTASSIUM CHLORIDE, AND CALCIUM CHLORIDE 50 ML/HR: .6; .31; .03; .02 INJECTION, SOLUTION INTRAVENOUS at 11:17

## 2018-01-14 RX ADMIN — ALLOPURINOL 300 MG: 300 TABLET ORAL at 09:58

## 2018-01-14 RX ADMIN — ACETAMINOPHEN 650 MG: 325 TABLET, FILM COATED ORAL at 05:30

## 2018-01-14 RX ADMIN — ATENOLOL 50 MG: 50 TABLET ORAL at 19:25

## 2018-01-14 NOTE — ASSESSMENT & PLAN NOTE
- continues to slowly improve, encourage PO intake  Continue gentle IVFs and hold on resuming lasix today     - repeat BMP in AM  - unclear baseline, no other charts for patient in system and no prior BMP

## 2018-01-14 NOTE — PROGRESS NOTES
Progress Note - Wallace Sosa 11/23/1931, 80 y o  female MRN: [id number]    Unit/Bed#: [facility] 927-01 Encounter: [id number]    Primary Care Provider: No primary care provider on file  Date and time admitted to hospital: 1/11/2018  5:34 PM    81 y/o female s/p fall     Fall from bed   Assessment & Plan    - PT/OT recommending inpatient rehab        [facility] (subarachnoid hemorrhage) (HCC)   Assessment & Plan    - Repeat CT head stable yesterday  - Received 10 mg vitamin K, FFP, DDAVP and KCentra  INR stable at 1 2 s/p reversal    - Hold all AC/AP  - continue neuro checks q4h, can d/c HOT protocol  - No seizure ppx; per neurosurgery    - PT/OT recommending inpatient rehab           Intraparenchymal hematoma of brain due to trauma [facility])   Assessment & Plan    - repeat imaging on 1/12 stable, no plans for further re-imaging  - GCS 15, non-focal exam  - received FFP, Vit K, K centra and DDAVP  - INR 1 2        Hypokalemiaresolved as of 1/14/2018   Assessment & Plan    - replete today and re-check in AM        * Subd[facility] hemorrhage ([facility] )   Assessment & Plan    - Repeat CT head stable on 1/12  - Received 10 mg vitamin K, FFP, DDAVP and KCentra  INR stable at 1 2 s/p reversal    - Hold all AC/AP  - continue neuro checks q4h, can d/c HOT protocol  - No seizure ppx; per neurosurgery    - PT/OT recommending inpatient rehab           Coagulopathy [facility])   Assessment & Plan    - s/p reversal as above  - continue to hold ASA and coumadin        ANABEL (acute kidney injury) ([facility] )   Assessment & Plan    - continues to slowly improve, encourage PO intake  Continue gentle IVFs and hold on resuming lasix today  - repeat BMP in AM  - unclear baseline, no other charts for patient in system and no prior BMP        Atrial fibrillation ([facility] )   Assessment & Plan    - Rate controlled  Continue atenolol 50 mg b i d    - Hold Coumadin in setting of ICH        CHF (congestive heart failure) ([facility] )   Assessment & Plan    - no evidence of acute exacerbation  - hold lasix an additional day due to low SBP prior to atenolol and need for IVF hydration due to ANABEL          HTN (hypertension)   Assessment & Plan    - Hold home antihypertensives in setting of low blood pressures  Alzheimer disease   Assessment & Plan    - Appears at baseline, oriented to self, location, and situation  - Frequent reorienting/redirection   - Attempt to regulate sleep-wake cycles  - Appreciate geriatric consultation  Proph: SCDs, start SQH today (cleared by neurosurgery)  Disp: D/C to SNF, likely tomorrow    Subjective: "I'm feeling well"    Objective: She does c/o mild headache  Slept well  Motivated to drink more fluids today  Meds/Allergies     Current Facility-Administered Medications:     acetaminophen (TYLENOL) tablet 650 mg, 650 mg, Oral, Q8H [address], Divine Rabenold, DO, 650 mg at 01/14/18 0530    allopurinol (ZYLOPRIM) tablet 300 mg, 300 mg, Oral, Daily, Torres Castaneda MD, 300 mg at 01/14/18 0958    atenolol (TENORMIN) tablet 50 mg, 50 mg, Oral, BID, Torres Castaneda MD, 50 mg at 01/14/18 6125    cholecalciferol (VITAMIN D3) tablet 2,000 Units, 2,000 Units, Oral, Daily, Torres Castaneda MD, 2,000 Units at 01/14/18 6000    Vitals: Blood pressure 116/70, pulse 71, temperature 97 9 °F (36 6 °C), temperature source Oral, resp  rate 18, height 5' (1 524 m), weight 81 6 kg (180 lb), SpO2 96 %  Body mass index is 35 15 kg/m²   SpO2: SpO2: 96 %, SpO2 Device: O2 Device: None (Room air)    ABG: No results found for: PHART, ULI9LXJ, PO2ART, YGL2UQN, M2IDNBTN, BEART, SOURCE      Intake/Output Summary (Last 24 hours) at 01/14/18 1018  Last data filed at 01/14/18 1013   Gross per 24 hour   Intake           1865 5 ml   Output                0 ml   Net           1865 5 ml       Invasive Devices          No matching active lines, drains, or airways                    Nutrition/GI Proph/Bowel Reg: Regular    Physical Exam:   GENERAL APPEARANCE: NAD  HEENT: NCAT  CV: RRR, no MGR  LUNGS: CTA bilaterally  ABD: soft, non-tender, non-distended  EXT: moving all equally  NEURO: GCS 15, non-focal exam  SKIN: pink, warm, dry      Lab Results:   BMP/CMP:   Lab Results   Component Value Date     (H) 01/14/2018    K 3 8 01/14/2018     (H) 01/14/2018    CO2 29 01/14/2018    ANIONGAP 7 01/14/2018     (H) 01/14/2018    CREATININE 2 42 (H) 01/14/2018    GLUCOSE 90 01/14/2018    CALCIUM 9 5 01/14/2018    EGFR 18 01/14/2018    and CBC:   Lab Results   Component Value Date    WBC 6 84 01/14/2018    HGB 10 4 (L) 01/14/2018    HCT 32 0 (L) 01/14/2018    MCV 97 01/14/2018     01/14/2018    MCH 31 5 01/14/2018    MCHC 32 5 01/14/2018    RDW 14 7 01/14/2018    MPV 11 7 01/14/2018    NRBC 0 01/14/2018     Imaging/EKG Studies: Results: I have personally reviewed pertinent reports      Other Studies: no new  VTE Prophylaxis: SCDs

## 2018-01-14 NOTE — ASSESSMENT & PLAN NOTE
- Repeat CT head stable on 1/12  - Received 10 mg vitamin K, FFP, DDAVP and KCentra   INR stable at 1 2 s/p reversal    - Hold all AC/AP  - continue neuro checks q4h, can d/c HOT protocol  - No seizure ppx; per neurosurgery    - PT/OT recommending inpatient rehab

## 2018-01-14 NOTE — ASSESSMENT & PLAN NOTE
- Repeat CT head stable yesterday  - Received 10 mg vitamin K, FFP, DDAVP and KCentra   INR stable at 1 2 s/p reversal    - Hold all AC/AP  - continue neuro checks q4h, can d/c HOT protocol  - No seizure ppx; per neurosurgery    - PT/OT recommending inpatient rehab

## 2018-01-15 LAB
ANION GAP SERPL CALCULATED.3IONS-SCNC: 6 MMOL/L (ref 4–13)
BUN SERPL-MCNC: 79 MG/DL (ref 5–25)
CALCIUM SERPL-MCNC: 9.1 MG/DL (ref 8.3–10.1)
CHLORIDE SERPL-SCNC: 107 MMOL/L (ref 100–108)
CO2 SERPL-SCNC: 29 MMOL/L (ref 21–32)
CREAT SERPL-MCNC: 2.14 MG/DL (ref 0.6–1.3)
GFR SERPL CREATININE-BSD FRML MDRD: 20 ML/MIN/1.73SQ M
GLUCOSE SERPL-MCNC: 87 MG/DL (ref 65–140)
POTASSIUM SERPL-SCNC: 3.9 MMOL/L (ref 3.5–5.3)
SODIUM SERPL-SCNC: 142 MMOL/L (ref 136–145)

## 2018-01-15 PROCEDURE — 97530 THERAPEUTIC ACTIVITIES: CPT

## 2018-01-15 PROCEDURE — 97535 SELF CARE MNGMENT TRAINING: CPT

## 2018-01-15 PROCEDURE — 80048 BASIC METABOLIC PNL TOTAL CA: CPT | Performed by: PHYSICIAN ASSISTANT

## 2018-01-15 PROCEDURE — 97116 GAIT TRAINING THERAPY: CPT

## 2018-01-15 RX ADMIN — VITAMIN D, TAB 1000IU (100/BT) 2000 UNITS: 25 TAB at 09:16

## 2018-01-15 RX ADMIN — HEPARIN SODIUM 5000 UNITS: 5000 INJECTION, SOLUTION INTRAVENOUS; SUBCUTANEOUS at 05:54

## 2018-01-15 RX ADMIN — SODIUM CHLORIDE, SODIUM LACTATE, POTASSIUM CHLORIDE, AND CALCIUM CHLORIDE 50 ML/HR: .6; .31; .03; .02 INJECTION, SOLUTION INTRAVENOUS at 01:00

## 2018-01-15 RX ADMIN — ATENOLOL 50 MG: 50 TABLET ORAL at 17:43

## 2018-01-15 RX ADMIN — HEPARIN SODIUM 5000 UNITS: 5000 INJECTION, SOLUTION INTRAVENOUS; SUBCUTANEOUS at 21:37

## 2018-01-15 RX ADMIN — ACETAMINOPHEN 650 MG: 325 TABLET, FILM COATED ORAL at 05:54

## 2018-01-15 RX ADMIN — HEPARIN SODIUM 5000 UNITS: 5000 INJECTION, SOLUTION INTRAVENOUS; SUBCUTANEOUS at 13:38

## 2018-01-15 RX ADMIN — ALLOPURINOL 300 MG: 300 TABLET ORAL at 09:16

## 2018-01-15 RX ADMIN — ACETAMINOPHEN 650 MG: 325 TABLET, FILM COATED ORAL at 21:37

## 2018-01-15 RX ADMIN — ACETAMINOPHEN 650 MG: 325 TABLET, FILM COATED ORAL at 13:38

## 2018-01-15 NOTE — PLAN OF CARE
Problem: PHYSICAL THERAPY ADULT  Goal: Performs mobility at highest level of function for planned discharge setting  See evaluation for individualized goals  Treatment/Interventions: Functional transfer training, LE strengthening/ROM, Elevations, Therapeutic exercise, Endurance training, Patient/family training, Equipment eval/education, Bed mobility, Gait training, Compensatory technique education, Continued evaluation, Spoke to nursing  Equipment Recommended: Stephon Gutierrez       See flowsheet documentation for full assessment, interventions and recommendations  Outcome: Progressing  Prognosis: Good  Problem List: Decreased strength, Decreased endurance, Impaired balance, Decreased mobility, Decreased coordination, Decreased cognition, Impaired judgement, Decreased safety awareness, Pain  Assessment: The pt  was able to ambulate farther today, but she continues to fatigue easily, and she requires assistance  She was incontinent of bladder twice during the session, and she had an instance of emesis  She remains a high fall risk, and she was seated in the bedside chair with the alarm active post session  Barriers to Discharge: Inaccessible home environment, Decreased caregiver support     Recommendation: Post acute IP rehab     PT - OK to Discharge: Yes    See flowsheet documentation for full assessment

## 2018-01-15 NOTE — SOCIAL WORK
The [place] felt the pt would be more appropriate for Acute   CM discussed acute options with the pt who would like a referral placed with [facility] first and then if denied would go to [facility]

## 2018-01-15 NOTE — PLAN OF CARE
Problem: OCCUPATIONAL THERAPY ADULT  Goal: Performs self-care activities at highest level of function for planned discharge setting  See evaluation for individualized goals  Treatment Interventions: ADL retraining, Functional transfer training, Endurance training, Cognitive reorientation, Activityengagement          See flowsheet documentation for full assessment, interventions and recommendations  Outcome: Progressing  Limitation: Decreased ADL status, Decreased UE strength, Decreased Safe judgement during ADL, Decreased cognition, Decreased self-care trans, Visual deficit, Decreased endurance, Decreased high-level ADLs  Prognosis: Fair  Assessment: pt participated in am ot session and was seen focusing on toileting needs, lb dressing, functional transfers and activity tolerence  pt tolerated session without c/o dizziness or pain  pt requires mod vc's for carryover of recommended techniques ie hand placement during transfers, walker positioning       Recommendation: Geriatric Consult  OT Discharge Recommendation: Short Term Rehab  OT - OK to Discharge: Yes (To STR when medically stable )  Ekaterina Jacobs

## 2018-01-15 NOTE — ASSESSMENT & PLAN NOTE
- Received 10 mg vitamin K, FFP, DDAVP and KCentra  INR stable at 1 2 s/p reversal    - Hold all AC/AP, at least 2 weeks per neurosurgery     - continue neuro checks q4h, can d/c HOT protocol  - No seizure ppx; per neurosurgery    - PT/OT recommending inpatient rehab

## 2018-01-15 NOTE — ASSESSMENT & PLAN NOTE
- Repeat CT head stable on 1/12  - Received 10 mg vitamin K, FFP, DDAVP and KCentra   INR stable at 1 2 s/p reversal    - Hold all AC/AP (at least 2 weeks)  - continue neuro checks q4h  - No seizure ppx; per neurosurgery    - PT/OT recommending inpatient rehab

## 2018-01-15 NOTE — ASSESSMENT & PLAN NOTE
-continues to slowly improve, encourage PO intake  Continue gentle IVFs  Will restart Lasix upon discharge   -unclear baseline, no other charts for patient in system and no prior BMP  -If placement today, will check BMP in 3 days at SNF to monitor renal function

## 2018-01-15 NOTE — ASSESSMENT & PLAN NOTE
- repeat imaging on 1/12 stable, plan for 2 week follow up w/ neurosurgery with reimaging  - GCS 15, non-focal exam  - s/p FFP, Vit K, K centra and DDAVP  - INR 1 2

## 2018-01-15 NOTE — PROGRESS NOTES
Progress Note - Yaa Sanchez 11/23/1931, 80 y o  female MRN: [id number]    Unit/Bed#: [facility] 351-01 Encounter: [id number]    Primary Care Provider: No primary care provider on file  Date and time admitted to hospital: 1/11/2018  5:34 PM        Fall from bed   Assessment & Plan    - PT/OT recommending inpatient rehab, likely d/c today        SAH (subarachnoid hemorrhage)   Assessment & Plan    - Received 10 mg vitamin K, FFP, DDAVP and KCentra  INR stable at 1 2 s/p reversal    - Hold all AC/AP, at least 2 weeks per neurosurgery  - continue neuro checks q4h, can d/c HOT protocol  - No seizure ppx; per neurosurgery    - PT/OT recommending inpatient rehab           Intraparenchymal hematoma of brain due to trauma   Assessment & Plan    - repeat imaging on 1/12 stable, plan for 2 week follow up w/ neurosurgery with reimaging  - GCS 15, non-focal exam  - s/p FFP, Vit K, K centra and DDAVP  - INR 1 2        * Subdural hemorrhage   Assessment & Plan    - Repeat CT head stable on 1/12  - Received 10 mg vitamin K, FFP, DDAVP and KCentra  INR stable at 1 2 s/p reversal    - Hold all AC/AP (at least 2 weeks)  - continue neuro checks q4h  - No seizure ppx; per neurosurgery    - PT/OT recommending inpatient rehab           Coagulopathy   Assessment & Plan    - s/p reversal as above  - continue to hold ASA and coumadin        ANABEL (acute kidney injury)   Assessment & Plan    -continues to slowly improve, encourage PO intake  Continue gentle IVFs  Will restart Lasix upon discharge   -unclear baseline, no other charts for patient in system and no prior BMP  -If placement today, will check BMP in 3 days at SNF to monitor renal function  Atrial fibrillation   Assessment & Plan    - Rate controlled  Continue atenolol 50 mg b i d  - Hold Coumadin in setting of ICH  Consider restarting in 2 weeks if repeat head CT stable as stroke risk is 7%          CHF (congestive heart failure)   Assessment & Plan    - no evidence of acute exacerbation  -Restart home Lasix upon discharge          HTN (hypertension)   Assessment & Plan    - Hold home antihypertensives in setting of low blood pressures  Alzheimer disease   Assessment & Plan    - Appears at baseline, oriented to self, location, and situation  - Frequent reorienting/redirection   - Attempt to regulate sleep-wake cycles  - Appreciate geriatric consultation  Chief Complaint: "I'm okay"    Subjective: No acute events overnight  Patient denies headache, changes in vision, shortness of breath, chest pain, or dizziness  She is tolerating her current diet and has been working with physical therapy for several days  Awaiting SNF placement    Objective:   Meds/Allergies     Current Facility-Administered Medications:     acetaminophen (TYLENOL) tablet 650 mg, 650 mg, Oral, Q8H [address], Divine Alfredo DO, 650 mg at 01/15/18 0554    allopurinol (ZYLOPRIM) tablet 300 mg, 300 mg, Oral, Daily, Shakir Gaona MD, 300 mg at 01/14/18 4508    atenolol (TENORMIN) tablet 50 mg, 50 mg, Oral, BID, Shakir Gaona MD, 50 mg at 01/14/18 1925    cholecalciferol (VITAMIN D3) tablet 2,000 Units, 2,000 Units, Oral, Daily, Shakir Gaona MD, 2,000 Units at 01/14/18 0958    heparin (porcine) subcutaneous injection 5,000 Units, 5,000 Units, Subcutaneous, Q8H [address], Jordana Velasco PA-C, 5,000 Units at 01/15/18 0554    lactated ringers infusion, 50 mL/hr, Intravenous, Continuous, Jordana Velasco PA-C, Last Rate: 50 mL/hr at 01/15/18 0100, 50 mL/hr at 01/15/18 0100    Vitals: Blood pressure 120/70, pulse 81, temperature 97 7 °F (36 5 °C), temperature source Oral, resp  rate 18, height 5' (1 524 m), weight 81 6 kg (180 lb), SpO2 97 %  Body mass index is 35 15 kg/m²   SpO2: SpO2: 97 %    ABG: No results found for: PHART, FZI8CHN, PO2ART, NZV1DVC, N7YPVCMU, BEART, SOURCE      Intake/Output Summary (Last 24 hours) at 01/15/18 5117  Last data filed at 01/15/18 0619   Gross per 24 hour   Intake              845 ml   Output                0 ml   Net              840 ml       Invasive Devices     Peripheral Intravenous Line            Peripheral IV 01/14/18 Left Antecubital less than 1 day                Nutrition/GI Proph/Bowel Reg: Regular house diet    Physical Exam:   GENERAL APPEARANCE: NAD, resting comfortably in bed  HEENT: NCAT  CV: RRR, no murmurs, rubs, or gallops  LUNGS: CTA b/l, no wheezes, rales, or rhonchi  ABD: Soft, NT/ND  EXT: Moving all extremities x4, strength 5/5 throughout  NEURO: Alert to self only  States the year is 0, does not know which hospital she is at  GCS 15   No focal neurologic deficits  SKIN: Warm, dry, intact      Lab Results:   BMP/CMP:   Lab Results   Component Value Date     01/15/2018    K 3 9 01/15/2018     01/15/2018    CO2 29 01/15/2018    ANIONGAP 6 01/15/2018    BUN 79 (H) 01/15/2018    CREATININE 2 14 (H) 01/15/2018    GLUCOSE 87 01/15/2018    CALCIUM 9 1 01/15/2018    EGFR 20 01/15/2018     VTE Prophylaxis: SQH, SCDs

## 2018-01-15 NOTE — PHYSICAL THERAPY NOTE
Physical Therapy Cancellation Note     01/15/18 1230   Pain Assessment   Pain Assessment No/denies pain   Pain Score No Pain   Hospital Pain Intervention(s) Repositioned; Ambulation/increased activity; Emotional support   Response to Interventions Tolerated  Restrictions/Precautions   Other Precautions Cognitive; Chair Alarm; Fall Risk;Multiple lines  (Alarm active post session )   Subjective   Subjective The pt  is agreeable to ambulate with therapy  Transfers   Sit to Stand 3  Moderate assistance   Additional items Assist x 1; Increased time required   Stand to Sit 4  Minimal assistance   Additional items Assist x 1; Increased time required;Verbal cues   Ambulation/Elevation   Gait pattern Excessively slow; Short stride; Inconsistent you; Shuffling;Decreased foot clearance; Forward Flexion   Gait Assistance 4  Minimal assist   Additional items Assist x 1;Verbal cues; Tactile cues   Assistive Device Rolling walker   Distance 30 feet  Balance   Static Sitting Fair +   Static Standing Poor +   Ambulatory Poor +   Activity Tolerance   Activity Tolerance Patient tolerated treatment well;Patient limited by fatigue   Nurse Made Aware Yes  Assessment   Prognosis Good   Problem List Decreased strength;Decreased endurance; Impaired balance;Decreased mobility; Decreased coordination;Decreased cognition; Impaired judgement;Decreased safety awareness;Pain   Assessment The pt  was able to ambulate farther today, but she continues to fatigue easily, and she requires assistance  She was incontinent of bladder twice during the session, and she had an instance of emesis  She remains a high fall risk, and she was seated in the bedside chair with the alarm active post session  Barriers to Discharge Inaccessible home environment;Decreased caregiver support   Goals   Patient Goals None expressed     STG Expiration Date 01/23/18   Treatment Day 1   Plan   Treatment/Interventions Functional transfer training;JG strengthening/ROM; Therapeutic exercise; Endurance training;Patient/family training;Bed mobility;Gait training   Progress Progressing toward goals   PT Frequency Other (Comment)  (6x a week )   Recommendation   Recommendation Post acute IP rehab   Equipment Recommended Hildred Quiet, PTA

## 2018-01-15 NOTE — CASE MANAGEMENT
Continued Stay Review    Date: 01/15/18    Vital Signs: /70   Pulse 81   Temp 97 7 °F (36 5 °C) (Oral)   Resp 18   Ht 5' (1 524 m)   Wt 81 6 kg (180 lb)   SpO2 97%   BMI 35 15 kg/m²     Medications:   Scheduled Meds:   acetaminophen 650 mg Oral Q8H [facility]   allopurinol 300 mg Oral Daily   atenolol 50 mg Oral BID   cholecalciferol 2,000 Units Oral Daily   heparin (porcine) 5,000 Units Subcutaneous Q8H [facility]     Continuous Infusions:   lactated ringers 50 mL/hr Last Rate: 50 mL/hr (01/15/18 0100)     PRN Meds:     Abnormal Labs/Diagnostic Results:     BUN 79 (H)     CREATININE 2 14 (H)         Age/Sex: 80 y o  female     Assessment/Plan:   Fall from bed   Assessment & Plan     - PT/OT recommending inpatient rehab, likely d/c today       SAH (subarachnoid hemorrhage)   Assessment & Plan     - Received 10 mg vitamin K, FFP, DDAVP and KCentra  INR stable at 1 2 s/p reversal    - Hold all AC/AP, at least 2 weeks per neurosurgery  - continue neuro checks q4h, can d/c HOT protocol  - No seizure ppx; per neurosurgery    - PT/OT recommending inpatient rehab          Intraparenchymal hematoma of brain due to trauma   Assessment & Plan     - repeat imaging on 1/12 stable, plan for 2 week follow up w/ neurosurgery with reimaging  - GCS 15, non-focal exam  - s/p FFP, Vit K, K centra and DDAVP  - INR 1 2       * Subdural hemorrhage   Assessment & Plan     - Repeat CT head stable on 1/12  - Received 10 mg vitamin K, FFP, DDAVP and KCentra  INR stable at 1 2 s/p reversal    - Hold all AC/AP (at least 2 weeks)  - continue neuro checks q4h  - No seizure ppx; per neurosurgery    - PT/OT recommending inpatient rehab          Coagulopathy   Assessment & Plan     - s/p reversal as above  - continue to hold ASA and coumadin       ANABEL (acute kidney injury)   Assessment & Plan     -continues to slowly improve, encourage PO intake  Continue gentle IVFs   Will restart Lasix upon discharge   -unclear baseline, no other charts for patient in system and no prior BMP  -If placement today, will check BMP in 3 days at SNF to monitor renal function        Atrial fibrillation   Assessment & Plan     - Rate controlled   Continue atenolol 50 mg b i d  - Hold Coumadin in setting of ICH  Consider restarting in 2 weeks if repeat head CT stable as stroke risk is 7%        CHF (congestive heart failure)   Assessment & Plan     - no evidence of acute exacerbation  -Restart home Lasix upon discharge          HTN (hypertension)   Assessment & Plan     - Hold home antihypertensives in setting of low blood pressures        Alzheimer disease   Assessment & Plan     - Appears at baseline, oriented to self, location, and situation  - Frequent reorienting/redirection   - Attempt to regulate sleep-wake cycles     - Appreciate geriatric consultation  Discharge Plan: TBD      Thank you,  [facility] in the [facility] for 2017  Network Utilization Review Department  Phone: [phone]; Fax [phone]  ATTENTION: The Network Utilization Review Department is now centralized for our 7 Facilities  Make a note that we have a new phone and fax numbers for our Department  Please call with any questions or concerns to [phone] and carefully follow the prompts so that you are directed to the right person  All voicemails are confidential  Fax any determinations, approvals, denials, and requests for initial or continue stay review clinical to [phone]  Due to HIGH CALL volume, it would be easier if you could please send faxed requests to expedite your requests and in part, help us provide discharge notifications faster

## 2018-01-15 NOTE — OCCUPATIONAL THERAPY NOTE
Occupational Therapy Treatment Note:       01/15/18 1200   Pain Assessment   Pain Assessment No/denies pain   Pain Score No Pain   ADL   Grooming Assistance 5  Supervision/Setup   LB Bathing Assistance 3  Moderate Assistance   LB Dressing Assistance 3  Moderate Assistance  (mod a pants max asst socks)   Toileting Comments clothing management max asst in stance   Functional Standing Tolerance   Time 3 min during incontenence management  Transfers   Sit to Stand 3  Moderate assistance   Additional items Assist x 1   Stand to Sit 3  Moderate assistance   Additional items Assist x 1   Functional Mobility   Functional Mobility 3  Moderate assistance   Cognition   Overall Cognitive Status Impaired   Activity Tolerance   Activity Tolerance Patient tolerated treatment well   Assessment   Assessment pt participated in am ot session and was seen focusing on toileting needs, lb dressing, functional transfers and activity tolerence  pt tolerated session without c/o dizziness or pain  pt requires mod vc's for carryover of recommended techniques ie hand placement during transfers, walker positioning  Plan   Treatment Interventions ADL retraining;Functional transfer training; Endurance training;Cognitive reorientation; Activityengagement   Goal Expiration Date 01/27/18   Treatment Day 2   OT Frequency 3-5x/wk   Recommendation   OT Discharge Recommendation Short Term Rehab   Barthel Index   Feeding 5   Bathing 0   Grooming Score 5   Dressing Score 5   Bladder Score 0   Bowels Score 10   Toilet Use Score 5   Transfers (Bed/Chair) Score 10   Mobility (Level Surface) Score 0   Stairs Score 0   Barthel Index Score 40   Modified [place] Scale   Modified [place] Scale 4   April A TAM Michelle

## 2018-01-15 NOTE — ASSESSMENT & PLAN NOTE
- Rate controlled  Continue atenolol 50 mg b i d  - Hold Coumadin in setting of ICH  Consider restarting in 2 weeks if repeat head CT stable as stroke risk is 7%

## 2018-01-15 NOTE — DISCHARGE INSTRUCTIONS
Neurosurgery Discharge Recommendations  Do not take any blood thinning medications (ie  No Advil  No motrin  No ibuprofen  No Aleve  No Aspirin  No Plavix  No fishoil  No heparin  No antiplatelet / no anticoagulation medication such as Plavix, Xarelto, Eliquis, etc)  Refrain from activity that increases chance of trauma to head or falls  Recommend you take fall precaution  No strenuous activity or sports  Return to hospital Emergency Room if you experience worsening / new headache, nausea/vomiting, speech/vision change, seizure, confusion / mental status change, weakness, or other neurological changes

## 2018-01-16 VITALS
DIASTOLIC BLOOD PRESSURE: 67 MMHG | BODY MASS INDEX: 35.34 KG/M2 | HEIGHT: 60 IN | HEART RATE: 87 BPM | WEIGHT: 180 LBS | TEMPERATURE: 98.1 F | SYSTOLIC BLOOD PRESSURE: 97 MMHG | RESPIRATION RATE: 20 BRPM | OXYGEN SATURATION: 99 %

## 2018-01-16 RX ADMIN — ALLOPURINOL 300 MG: 300 TABLET ORAL at 08:16

## 2018-01-16 RX ADMIN — HEPARIN SODIUM 5000 UNITS: 5000 INJECTION, SOLUTION INTRAVENOUS; SUBCUTANEOUS at 14:27

## 2018-01-16 RX ADMIN — ACETAMINOPHEN 650 MG: 325 TABLET, FILM COATED ORAL at 06:11

## 2018-01-16 RX ADMIN — HEPARIN SODIUM 5000 UNITS: 5000 INJECTION, SOLUTION INTRAVENOUS; SUBCUTANEOUS at 06:11

## 2018-01-16 RX ADMIN — ACETAMINOPHEN 650 MG: 325 TABLET, FILM COATED ORAL at 14:27

## 2018-01-16 RX ADMIN — VITAMIN D, TAB 1000IU (100/BT) 2000 UNITS: 25 TAB at 08:16

## 2018-01-16 NOTE — PROGRESS NOTES
Progress Note - Jules Zimmer 11/23/1931, 80 y o  female MRN: [id number]    Unit/Bed#: CW3 337-01 Encounter: [id number]    Primary Care Provider: No primary care provider on file  Date and time admitted to hospital: 1/11/2018  5:34 PM      Patient denied ARC, referral out to [facility], awaiting placement otherwise medically stable  ANABEL resolving  Fall from bed   Assessment & Plan    - PT/OT recommending inpatient rehab, likely d/c today        SAH (subarachnoid hemorrhage)   Assessment & Plan    - Received 10 mg vitamin K, FFP, DDAVP and KCentra  INR stable at 1 2 s/p reversal    - Hold all AC/AP, at least 2 weeks per neurosurgery  - continue neuro checks q4h, can d/c HOT protocol  - No seizure ppx; per neurosurgery    - PT/OT recommending inpatient rehab           Intraparenchymal hematoma of brain due to trauma   Assessment & Plan    - repeat imaging on 1/12 stable, plan for 2 week follow up w/ neurosurgery with reimaging  - GCS 15, non-focal exam  - s/p FFP, Vit K, K centra and DDAVP  - INR 1 2        Coagulopathy   Assessment & Plan    - s/p reversal as above  - continue to hold ASA and coumadin        ANABEL (acute kidney injury)   Assessment & Plan    -continues to improve, will need recheck at SNF  -start lasix upon discharge          Atrial fibrillation   Assessment & Plan    - Rate controlled  Continue atenolol 50 mg b i d  - Hold Coumadin in setting of ICH  Consider restarting in 2 weeks if repeat head CT stable as stroke risk is 7%  HTN (hypertension)   Assessment & Plan    - Hold home antihypertensives in setting of low blood pressures  CHF (congestive heart failure)   Assessment & Plan    - no evidence of acute exacerbation  -Restart home Lasix upon discharge          Alzheimer disease   Assessment & Plan    - Appears at baseline, oriented to self, location, and situation  - Frequent reorienting/redirection   - Attempt to regulate sleep-wake cycles      - Appreciate geriatric consultation  * Subdural hemorrhage   Assessment & Plan    - Repeat CT head stable on 1/12  - Received 10 mg vitamin K, FFP, DDAVP and KCentra  INR stable at 1 2 s/p reversal    - Hold all AC/AP (at least 2 weeks)  - continue neuro checks q4h  - No seizure ppx; per neurosurgery    - PT/OT recommending inpatient rehab                     Subjective/Objective   Chief Complaint: I would like to sleep    Subjective: patient startled on arrival but has no c/o  Denies MUNOZ  Would like to sleep  Objective:     Meds/Allergies   Prescriptions Prior to Admission   Medication Sig Dispense Refill Last Dose    allopurinol (ZYLOPRIM) 300 mg tablet Take 300 mg by mouth daily   1/11/2018 at Unknown time    aspirin (ECOTRIN LOW STRENGTH) 81 mg EC tablet Take 81 mg by mouth daily   1/11/2018 at Unknown time    atenolol (TENORMIN) 50 mg tablet Take 50 mg by mouth 2 (two) times a day   1/11/2018 at Unknown time    cholecalciferol (VITAMIN D3) 1,000 units tablet Take 2,000 Units by mouth daily   1/11/2018 at Unknown time    furosemide (LASIX) 40 mg tablet Take 40 mg by mouth 2 (two) times a day   1/11/2018 at Unknown time    Melatonin-Pyridoxine ER (MELATONEX) 3-10 MG TBCR Take 1 tablet by mouth   1/11/2018 at Unknown time    potassium chloride (K-DUR,KLOR-CON) 10 mEq tablet Take 10 mEq by mouth daily   1/11/2018 at Unknown time    valsartan-hydrochlorothiazide (DIOVAN-HCT) 320-25 MG per tablet Take 1 tablet by mouth daily   1/11/2018 at Unknown time    warfarin (COUMADIN) 2 mg tablet Take 2 mg by mouth daily   1/11/2018 at Unknown time       Vitals: Blood pressure 112/69, pulse 75, temperature 98 2 °F (36 8 °C), temperature source Oral, resp  rate 18, height 5' (1 524 m), weight 81 6 kg (180 lb), SpO2 100 %  Body mass index is 35 15 kg/m²   SpO2: SpO2: 99 %    ABG: No results found for: PHART, GTD5RDG, PO2ART, TAV5UJM, D8ZHWUYJ, BEART, SOURCE      Intake/Output Summary (Last 24 hours) at 01/16/18 4192  Last data filed at 01/16/18 0550   Gross per 24 hour   Intake          2090 83 ml   Output                0 ml   Net          2090 83 ml       Invasive Devices          No matching active lines, drains, or airways          Nutrition/GI Proph/Bowel Reg: regular    Physical Exam:    Vital signs reviewed  Gen  appearance: No acute distress  Alert and oriented x3  Head: Atraumatic, normocephalic  Eyes: EOMI, PERRLA  No icterus  Neck: Supple, no lymphadenopathy, full range of motion  Chest: Regular rate and rhythm  Lungs are clear to auscultation bilaterally  Nontender  Abdomen: Soft nontender nondistended  No signs of ecchymosis  Positive bowel sounds  Extremities: Full range of motion in all extremities  DTRs intact  Neuro: Cranial nerves II through XII intact  Nonfocal exam  Unsure of year,month  Skin: Warm, dry  Lab Results: Results: I have personally reviewed pertinent reports  Imaging/EKG Studies: Results: I have personally reviewed pertinent reports      Other Studies:   VTE Prophylaxis: heparin

## 2018-01-30 NOTE — DISCHARGE SUMMARY
Discharge Summary - Mary Mcbride 80 y o  female MRN: [id number]    Unit/Bed#: CW3 337-01 Encounter: [id number]    Admission Date: 1/11/2018     Admitting Diagnosis: Subdural hemorrhage ([address] ) [I62 00]  Unspecified multiple injuries, initial encounter [T07  XXXA]    HPI: Mary Mcbride presented to [address] [place] [address] after a fall at home  History was limited due to patient's underlying dementia and confusion  , She was evaluated there and found to have a subarachnoid and intraparenchymal hemorrhage  She received kcentra, windy and ddavp for Coumadin coagulopathy  She was sent to [facility] for further trauma management and neurosurgical evaluation  Procedures Performed: No orders of the defined types were placed in this encounter  Hospital Course:  Patient was transfer to [facility] evaluated by Neurosurgery  It was deemed that the patient would not be a candidate for neurosurgical intervention if that was needed  She had repeat CT scans of her head that showed stable intracranial hemorrhage  Her Coumadin and aspirin were held  She was evaluated by PT OT and deemed stable for discharge  She was referred to [facility] and discharged there under stable condition  Significant Findings, Care, Treatment and Services Provided:     Ct Head Wo Contrast    Result Date: 1/12/2018  Impression: 1  Scattered multi compartmental intracranial hemorrhage with a somewhat more coalescent hematoma anteriorly inferiorly in the interhemispheric region possibly posttraumatic subdural hemorrhage  However given the rounded configuration and intimacy associated with the region of the [place], underlying aneurysm that has ruptured is a differential diagnosis  Consider CTA of the brain for further characterization  2   Small right frontal parenchymal hematoma with adjacent subarachnoid hemorrhage may represent hemorrhagic contusion    I personally discussed this study with   [facility] on 1/12/2018 12:50 PM  Workstation performed: [id number]         Complications: Acute kidney injury that resolved with IVF    Discharge Diagnosis:   Patient Active Problem List   Diagnosis    Subdural hemorrhage    Alzheimer disease    CHF (congestive heart failure)    HTN (hypertension)    Atrial fibrillation    ANABEL (acute kidney injury)    Coagulopathy    Intraparenchymal hematoma of brain due to trauma   [facility] (subarachnoid hemorrhage)    Fall from bed         Condition at Discharge: stable     Discharge instructions/Information to patient and family:   See after visit summary for information provided to patient and family  Provisions for Follow-Up Care:  See after visit summary for information related to follow-up care and any pertinent home health orders  Disposition: Short-term rehab at [facility] Readmission: No    Discharge Statement   I spent 30 minutes discharging the patient  This time was spent on the day of discharge  I had direct contact with the patient on the day of discharge  Additional documentation is required if more than 30 minutes were spent on discharge  Discharge Medications:  See after visit summary for reconciled discharge medications provided to patient and family

## 2018-02-15 ENCOUNTER — LAB REQUISITION (OUTPATIENT)
Dept: LAB | Facility: HOSPITAL | Age: 83
End: 2018-02-15
Payer: MEDICARE

## 2018-02-15 DIAGNOSIS — T81.40XA INFECTION FOLLOWING PROCEDURE: ICD-10-CM

## 2018-02-15 PROCEDURE — 87186 SC STD MICRODIL/AGAR DIL: CPT | Performed by: INTERNAL MEDICINE

## 2018-02-15 PROCEDURE — 87147 CULTURE TYPE IMMUNOLOGIC: CPT | Performed by: INTERNAL MEDICINE

## 2018-02-15 PROCEDURE — 87070 CULTURE OTHR SPECIMN AEROBIC: CPT | Performed by: INTERNAL MEDICINE

## 2018-02-15 PROCEDURE — 87205 SMEAR GRAM STAIN: CPT | Performed by: INTERNAL MEDICINE

## 2018-02-17 LAB
BACTERIA WND AEROBE CULT: ABNORMAL
GRAM STN SPEC: ABNORMAL
GRAM STN SPEC: ABNORMAL

## 2018-05-27 ENCOUNTER — HOSPITAL ENCOUNTER (EMERGENCY)
Facility: HOSPITAL | Age: 83
End: 2018-05-27
Attending: EMERGENCY MEDICINE | Admitting: EMERGENCY MEDICINE
Payer: MEDICARE

## 2018-05-27 ENCOUNTER — HOSPITAL ENCOUNTER (INPATIENT)
Facility: HOSPITAL | Age: 83
LOS: 5 days | DRG: 481 | End: 2018-06-01
Attending: INTERNAL MEDICINE | Admitting: INTERNAL MEDICINE
Payer: MEDICARE

## 2018-05-27 ENCOUNTER — APPOINTMENT (EMERGENCY)
Dept: RADIOLOGY | Facility: HOSPITAL | Age: 83
End: 2018-05-27
Payer: MEDICARE

## 2018-05-27 ENCOUNTER — APPOINTMENT (EMERGENCY)
Dept: CT IMAGING | Facility: HOSPITAL | Age: 83
End: 2018-05-27
Payer: MEDICARE

## 2018-05-27 VITALS
HEART RATE: 73 BPM | SYSTOLIC BLOOD PRESSURE: 143 MMHG | RESPIRATION RATE: 17 BRPM | TEMPERATURE: 97.6 F | OXYGEN SATURATION: 94 % | WEIGHT: 191.58 LBS | DIASTOLIC BLOOD PRESSURE: 77 MMHG | BODY MASS INDEX: 37.42 KG/M2

## 2018-05-27 DIAGNOSIS — D62 ACUTE BLOOD LOSS ANEMIA: ICD-10-CM
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
## 2018-05-27 DIAGNOSIS — K21.9 GERD (GASTROESOPHAGEAL REFLUX DISEASE): ICD-10-CM

## 2018-05-27 DIAGNOSIS — N18.9 CKD (CHRONIC KIDNEY DISEASE): ICD-10-CM

## 2018-05-27 DIAGNOSIS — G30.1 LATE ONSET ALZHEIMER'S DISEASE WITH BEHAVIORAL DISTURBANCE (HCC): ICD-10-CM

## 2018-05-27 DIAGNOSIS — S72.142A CLOSED INTERTROCHANTERIC FRACTURE OF HIP, LEFT, INITIAL ENCOUNTER (HCC): Primary | ICD-10-CM

## 2018-05-27 DIAGNOSIS — I48.91 ATRIAL FIBRILLATION (HCC): ICD-10-CM

## 2018-05-27 DIAGNOSIS — K59.00 CONSTIPATION: ICD-10-CM

## 2018-05-27 DIAGNOSIS — I50.9 CHF (CONGESTIVE HEART FAILURE) (HCC): ICD-10-CM

## 2018-05-27 DIAGNOSIS — S72.009B: ICD-10-CM

## 2018-05-27 DIAGNOSIS — S72.142A DISPLACED INTERTROCHANTERIC FRACTURE OF LEFT FEMUR, INITIAL ENCOUNTER FOR CLOSED FRACTURE (HCC): Primary | ICD-10-CM

## 2018-05-27 DIAGNOSIS — J40 BRONCHITIS: ICD-10-CM

## 2018-05-27 DIAGNOSIS — I10 HTN (HYPERTENSION): ICD-10-CM

## 2018-05-27 DIAGNOSIS — F02.81 LATE ONSET ALZHEIMER'S DISEASE WITH BEHAVIORAL DISTURBANCE (HCC): ICD-10-CM

## 2018-05-27 LAB
ABO GROUP BLD: NORMAL
ALBUMIN SERPL BCP-MCNC: 3.7 G/DL (ref 3.5–5)
ALP SERPL-CCNC: 121 U/L (ref 46–116)
ALT SERPL W P-5'-P-CCNC: 29 U/L (ref 12–78)
ANION GAP SERPL CALCULATED.3IONS-SCNC: 9 MMOL/L (ref 4–13)
ANISOCYTOSIS BLD QL SMEAR: PRESENT
APTT PPP: 24 SECONDS (ref 24–36)
AST SERPL W P-5'-P-CCNC: 20 U/L (ref 5–45)
BASOPHILS # BLD MANUAL: 0 THOUSAND/UL (ref 0–0.1)
BASOPHILS NFR MAR MANUAL: 0 % (ref 0–1)
BILIRUB SERPL-MCNC: 0.4 MG/DL (ref 0.2–1)
BLD GP AB SCN SERPL QL: NEGATIVE
BUN SERPL-MCNC: 54 MG/DL (ref 5–25)
CALCIUM SERPL-MCNC: 8.6 MG/DL (ref 8.3–10.1)
CHLORIDE SERPL-SCNC: 105 MMOL/L (ref 100–108)
CO2 SERPL-SCNC: 30 MMOL/L (ref 21–32)
CREAT SERPL-MCNC: 2.48 MG/DL (ref 0.6–1.3)
EOSINOPHIL # BLD MANUAL: 0 THOUSAND/UL (ref 0–0.4)
EOSINOPHIL NFR BLD MANUAL: 0 % (ref 0–6)
ERYTHROCYTE [DISTWIDTH] IN BLOOD BY AUTOMATED COUNT: 14.6 % (ref 11.6–15.1)
EST. AVERAGE GLUCOSE BLD GHB EST-MCNC: 108 MG/DL
GFR SERPL CREATININE-BSD FRML MDRD: 17 ML/MIN/1.73SQ M
GLUCOSE SERPL-MCNC: 108 MG/DL (ref 65–140)
HBA1C MFR BLD: 5.4 % (ref 4.2–6.3)
HCT VFR BLD AUTO: 34.8 % (ref 34.8–46.1)
HGB BLD-MCNC: 11.3 G/DL (ref 11.5–15.4)
HOLD SPECIMEN: NORMAL
HOLD SPECIMEN: NORMAL
INR PPP: 1.06 (ref 0.86–1.17)
LYMPHOCYTES # BLD AUTO: 0.92 THOUSAND/UL (ref 0.6–4.47)
LYMPHOCYTES # BLD AUTO: 7 % (ref 14–44)
MCH RBC QN AUTO: 30.3 PG (ref 26.8–34.3)
MCHC RBC AUTO-ENTMCNC: 32.5 G/DL (ref 31.4–37.4)
MCV RBC AUTO: 93 FL (ref 82–98)
MONOCYTES # BLD AUTO: 1.45 THOUSAND/UL (ref 0–1.22)
MONOCYTES NFR BLD: 11 % (ref 4–12)
NEUTROPHILS # BLD MANUAL: 10.82 THOUSAND/UL (ref 1.85–7.62)
NEUTS SEG NFR BLD AUTO: 82 % (ref 43–75)
PLATELET # BLD AUTO: 264 THOUSANDS/UL (ref 149–390)
PLATELET # BLD AUTO: 330 THOUSANDS/UL (ref 149–390)
PLATELET BLD QL SMEAR: ADEQUATE
PMV BLD AUTO: 10.1 FL (ref 8.9–12.7)
PMV BLD AUTO: 9.5 FL (ref 8.9–12.7)
POTASSIUM SERPL-SCNC: 3.9 MMOL/L (ref 3.5–5.3)
PROT SERPL-MCNC: 7.7 G/DL (ref 6.4–8.2)
PROTHROMBIN TIME: 13.2 SECONDS (ref 11.8–14.2)
RBC # BLD AUTO: 3.73 MILLION/UL (ref 3.81–5.12)
RH BLD: POSITIVE
SODIUM SERPL-SCNC: 144 MMOL/L (ref 136–145)
SPECIMEN EXPIRATION DATE: NORMAL
TOTAL CELLS COUNTED SPEC: 100
WBC # BLD AUTO: 13.19 THOUSAND/UL (ref 4.31–10.16)

## 2018-05-27 PROCEDURE — 93005 ELECTROCARDIOGRAM TRACING: CPT

## 2018-05-27 PROCEDURE — 71045 X-RAY EXAM CHEST 1 VIEW: CPT

## 2018-05-27 PROCEDURE — 99285 EMERGENCY DEPT VISIT HI MDM: CPT

## 2018-05-27 PROCEDURE — 73700 CT LOWER EXTREMITY W/O DYE: CPT

## 2018-05-27 PROCEDURE — 36415 COLL VENOUS BLD VENIPUNCTURE: CPT | Performed by: EMERGENCY MEDICINE

## 2018-05-27 PROCEDURE — 73502 X-RAY EXAM HIP UNI 2-3 VIEWS: CPT

## 2018-05-27 PROCEDURE — 86900 BLOOD TYPING SEROLOGIC ABO: CPT | Performed by: EMERGENCY MEDICINE

## 2018-05-27 PROCEDURE — 85007 BL SMEAR W/DIFF WBC COUNT: CPT | Performed by: EMERGENCY MEDICINE

## 2018-05-27 PROCEDURE — 96374 THER/PROPH/DIAG INJ IV PUSH: CPT

## 2018-05-27 PROCEDURE — 87081 CULTURE SCREEN ONLY: CPT | Performed by: INTERNAL MEDICINE

## 2018-05-27 PROCEDURE — 85610 PROTHROMBIN TIME: CPT | Performed by: EMERGENCY MEDICINE

## 2018-05-27 PROCEDURE — 99223 1ST HOSP IP/OBS HIGH 75: CPT | Performed by: INTERNAL MEDICINE

## 2018-05-27 PROCEDURE — 85730 THROMBOPLASTIN TIME PARTIAL: CPT | Performed by: EMERGENCY MEDICINE

## 2018-05-27 PROCEDURE — 80053 COMPREHEN METABOLIC PANEL: CPT | Performed by: EMERGENCY MEDICINE

## 2018-05-27 PROCEDURE — 86850 RBC ANTIBODY SCREEN: CPT | Performed by: EMERGENCY MEDICINE

## 2018-05-27 PROCEDURE — 85027 COMPLETE CBC AUTOMATED: CPT | Performed by: EMERGENCY MEDICINE

## 2018-05-27 PROCEDURE — 86901 BLOOD TYPING SEROLOGIC RH(D): CPT | Performed by: EMERGENCY MEDICINE

## 2018-05-27 PROCEDURE — 85049 AUTOMATED PLATELET COUNT: CPT | Performed by: INTERNAL MEDICINE

## 2018-05-27 PROCEDURE — 83036 HEMOGLOBIN GLYCOSYLATED A1C: CPT | Performed by: INTERNAL MEDICINE

## 2018-05-27 RX ORDER — SODIUM CHLORIDE 9 MG/ML
30 INJECTION, SOLUTION INTRAVENOUS CONTINUOUS
Status: DISCONTINUED | OUTPATIENT
Start: 2018-05-27 | End: 2018-05-27

## 2018-05-27 RX ORDER — FENTANYL CITRATE 50 UG/ML
INJECTION, SOLUTION INTRAMUSCULAR; INTRAVENOUS
Status: COMPLETED
Start: 2018-05-27 | End: 2018-05-27

## 2018-05-27 RX ORDER — DOCUSATE SODIUM 100 MG/1
100 CAPSULE, LIQUID FILLED ORAL 2 TIMES DAILY PRN
COMMUNITY

## 2018-05-27 RX ORDER — FUROSEMIDE 20 MG/1
60 TABLET ORAL 2 TIMES DAILY
Status: ON HOLD | COMMUNITY
End: 2018-06-01

## 2018-05-27 RX ORDER — SENNOSIDES 8.8 MG/5ML
8.8 LIQUID ORAL DAILY
Status: DISCONTINUED | OUTPATIENT
Start: 2018-05-28 | End: 2018-05-30

## 2018-05-27 RX ORDER — SODIUM CHLORIDE 9 MG/ML
30 INJECTION, SOLUTION INTRAVENOUS CONTINUOUS
Status: DISPENSED | OUTPATIENT
Start: 2018-05-27 | End: 2018-05-29

## 2018-05-27 RX ORDER — PREDNISONE 10 MG/1
40 TABLET ORAL DAILY
COMMUNITY
End: 2018-06-01 | Stop reason: HOSPADM

## 2018-05-27 RX ORDER — ACETAMINOPHEN 325 MG/1
650 TABLET ORAL EVERY 6 HOURS PRN
Status: ON HOLD | COMMUNITY
End: 2018-05-27

## 2018-05-27 RX ORDER — OXYCODONE HCL 5 MG/5 ML
5 SOLUTION, ORAL ORAL EVERY 6 HOURS PRN
Status: DISCONTINUED | OUTPATIENT
Start: 2018-05-27 | End: 2018-05-30

## 2018-05-27 RX ORDER — ACETAMINOPHEN 325 MG/1
975 TABLET ORAL EVERY 8 HOURS SCHEDULED
Status: DISCONTINUED | OUTPATIENT
Start: 2018-05-27 | End: 2018-06-01 | Stop reason: HOSPADM

## 2018-05-27 RX ORDER — GUAIFENESIN 100 MG/5ML
200 SOLUTION ORAL 4 TIMES DAILY
Status: DISCONTINUED | OUTPATIENT
Start: 2018-05-27 | End: 2018-05-30

## 2018-05-27 RX ORDER — POLYETHYLENE GLYCOL 3350 17 G/17G
17 POWDER, FOR SOLUTION ORAL DAILY
Status: DISCONTINUED | OUTPATIENT
Start: 2018-05-28 | End: 2018-05-30

## 2018-05-27 RX ORDER — IPRATROPIUM BROMIDE AND ALBUTEROL SULFATE 2.5; .5 MG/3ML; MG/3ML
3 SOLUTION RESPIRATORY (INHALATION)
Status: DISCONTINUED | OUTPATIENT
Start: 2018-05-27 | End: 2018-05-27

## 2018-05-27 RX ORDER — BUDESONIDE AND FORMOTEROL FUMARATE DIHYDRATE 160; 4.5 UG/1; UG/1
2 AEROSOL RESPIRATORY (INHALATION)
Status: DISCONTINUED | OUTPATIENT
Start: 2018-05-27 | End: 2018-06-01 | Stop reason: HOSPADM

## 2018-05-27 RX ORDER — SULFAMETHOXAZOLE AND TRIMETHOPRIM 400; 80 MG/1; MG/1
1 TABLET ORAL EVERY 12 HOURS SCHEDULED
Status: DISCONTINUED | OUTPATIENT
Start: 2018-05-27 | End: 2018-05-28

## 2018-05-27 RX ORDER — ATENOLOL 50 MG/1
50 TABLET ORAL DAILY
Status: DISCONTINUED | OUTPATIENT
Start: 2018-05-28 | End: 2018-06-01 | Stop reason: HOSPADM

## 2018-05-27 RX ORDER — ONDANSETRON 2 MG/ML
4 INJECTION INTRAMUSCULAR; INTRAVENOUS EVERY 6 HOURS PRN
Status: DISCONTINUED | OUTPATIENT
Start: 2018-05-27 | End: 2018-06-01 | Stop reason: HOSPADM

## 2018-05-27 RX ORDER — NYSTATIN 100000 [USP'U]/G
1 POWDER TOPICAL 2 TIMES DAILY PRN
COMMUNITY

## 2018-05-27 RX ORDER — MAGNESIUM HYDROXIDE/ALUMINUM HYDROXICE/SIMETHICONE 120; 1200; 1200 MG/30ML; MG/30ML; MG/30ML
30 SUSPENSION ORAL EVERY 6 HOURS PRN
Status: DISCONTINUED | OUTPATIENT
Start: 2018-05-27 | End: 2018-06-01 | Stop reason: HOSPADM

## 2018-05-27 RX ORDER — ACETAMINOPHEN 325 MG/1
650 TABLET ORAL EVERY 6 HOURS PRN
Status: DISCONTINUED | OUTPATIENT
Start: 2018-05-27 | End: 2018-05-27

## 2018-05-27 RX ORDER — PREDNISONE 20 MG/1
40 TABLET ORAL DAILY
Status: DISCONTINUED | OUTPATIENT
Start: 2018-05-28 | End: 2018-05-28

## 2018-05-27 RX ORDER — DIPHENOXYLATE HYDROCHLORIDE AND ATROPINE SULFATE 2.5; .025 MG/1; MG/1
1 TABLET ORAL EVERY 6 HOURS PRN
COMMUNITY
End: 2018-06-01 | Stop reason: HOSPADM

## 2018-05-27 RX ORDER — BUDESONIDE AND FORMOTEROL FUMARATE DIHYDRATE 160; 4.5 UG/1; UG/1
2 AEROSOL RESPIRATORY (INHALATION) 2 TIMES DAILY
COMMUNITY

## 2018-05-27 RX ORDER — FENTANYL CITRATE 50 UG/ML
75 INJECTION, SOLUTION INTRAMUSCULAR; INTRAVENOUS ONCE
Status: COMPLETED | OUTPATIENT
Start: 2018-05-27 | End: 2018-05-27

## 2018-05-27 RX ORDER — TRAMADOL HYDROCHLORIDE 50 MG/1
100 TABLET ORAL ONCE
Status: COMPLETED | OUTPATIENT
Start: 2018-05-27 | End: 2018-05-27

## 2018-05-27 RX ORDER — SODIUM CHLORIDE, SODIUM LACTATE, POTASSIUM CHLORIDE, CALCIUM CHLORIDE 600; 310; 30; 20 MG/100ML; MG/100ML; MG/100ML; MG/100ML
75 INJECTION, SOLUTION INTRAVENOUS CONTINUOUS
Status: DISCONTINUED | OUTPATIENT
Start: 2018-05-28 | End: 2018-05-29

## 2018-05-27 RX ORDER — POTASSIUM CHLORIDE 750 MG/1
10 TABLET, EXTENDED RELEASE ORAL EVERY OTHER DAY
Status: DISCONTINUED | OUTPATIENT
Start: 2018-05-28 | End: 2018-06-01 | Stop reason: HOSPADM

## 2018-05-27 RX ORDER — NYSTATIN 100000 [USP'U]/G
1 POWDER TOPICAL 2 TIMES DAILY
Status: DISCONTINUED | OUTPATIENT
Start: 2018-05-27 | End: 2018-06-01 | Stop reason: HOSPADM

## 2018-05-27 RX ORDER — SULFAMETHOXAZOLE AND TRIMETHOPRIM 800; 160 MG/1; MG/1
1 TABLET ORAL EVERY 12 HOURS SCHEDULED
COMMUNITY
End: 2018-06-01 | Stop reason: HOSPADM

## 2018-05-27 RX ORDER — LANOLIN ALCOHOL/MO/W.PET/CERES
6 CREAM (GRAM) TOPICAL
Status: DISCONTINUED | OUTPATIENT
Start: 2018-05-27 | End: 2018-05-30

## 2018-05-27 RX ORDER — MELATONIN
2000 DAILY
Status: DISCONTINUED | OUTPATIENT
Start: 2018-05-28 | End: 2018-06-01 | Stop reason: HOSPADM

## 2018-05-27 RX ORDER — ALLOPURINOL 100 MG/1
100 TABLET ORAL DAILY
COMMUNITY

## 2018-05-27 RX ORDER — DIPHENOXYLATE HYDROCHLORIDE AND ATROPINE SULFATE 2.5; .025 MG/1; MG/1
1 TABLET ORAL EVERY 6 HOURS PRN
Status: DISCONTINUED | OUTPATIENT
Start: 2018-05-27 | End: 2018-06-01 | Stop reason: HOSPADM

## 2018-05-27 RX ORDER — IPRATROPIUM BROMIDE AND ALBUTEROL SULFATE 2.5; .5 MG/3ML; MG/3ML
3 SOLUTION RESPIRATORY (INHALATION) 4 TIMES DAILY
COMMUNITY

## 2018-05-27 RX ORDER — ALLOPURINOL 100 MG/1
100 TABLET ORAL DAILY
Status: DISCONTINUED | OUTPATIENT
Start: 2018-05-28 | End: 2018-06-01 | Stop reason: HOSPADM

## 2018-05-27 RX ORDER — ALBUTEROL SULFATE 2.5 MG/3ML
2.5 SOLUTION RESPIRATORY (INHALATION) EVERY 6 HOURS PRN
Status: DISCONTINUED | OUTPATIENT
Start: 2018-05-27 | End: 2018-05-30

## 2018-05-27 RX ORDER — DOCUSATE SODIUM 100 MG/1
100 CAPSULE, LIQUID FILLED ORAL 2 TIMES DAILY PRN
Status: DISCONTINUED | OUTPATIENT
Start: 2018-05-27 | End: 2018-06-01 | Stop reason: HOSPADM

## 2018-05-27 RX ORDER — MOXIFLOXACIN HYDROCHLORIDE 400 MG/1
400 TABLET ORAL DAILY
COMMUNITY
End: 2018-06-01 | Stop reason: HOSPADM

## 2018-05-27 RX ADMIN — MELATONIN TAB 3 MG 6 MG: 3 TAB at 20:02

## 2018-05-27 RX ADMIN — SODIUM CHLORIDE 30 ML/HR: 0.9 INJECTION, SOLUTION INTRAVENOUS at 18:05

## 2018-05-27 RX ADMIN — FENTANYL CITRATE 75 MCG: 50 INJECTION, SOLUTION INTRAMUSCULAR; INTRAVENOUS at 07:28

## 2018-05-27 RX ADMIN — FENTANYL CITRATE 75 MCG: 50 INJECTION, SOLUTION INTRAMUSCULAR; INTRAVENOUS at 09:10

## 2018-05-27 RX ADMIN — TRAMADOL HYDROCHLORIDE 100 MG: 50 TABLET, FILM COATED ORAL at 10:22

## 2018-05-27 RX ADMIN — BUDESONIDE AND FORMOTEROL FUMARATE DIHYDRATE 2 PUFF: 160; 4.5 AEROSOL RESPIRATORY (INHALATION) at 19:53

## 2018-05-27 RX ADMIN — SODIUM CHLORIDE, SODIUM LACTATE, POTASSIUM CHLORIDE, AND CALCIUM CHLORIDE 75 ML/HR: .6; .31; .03; .02 INJECTION, SOLUTION INTRAVENOUS at 23:46

## 2018-05-27 RX ADMIN — ACETAMINOPHEN 975 MG: 325 TABLET ORAL at 21:13

## 2018-05-27 RX ADMIN — SULFAMETHOXAZOLE AND TRIMETHOPRIM 1 TABLET: 400; 80 TABLET ORAL at 21:56

## 2018-05-27 RX ADMIN — GUAIFENESIN 200 MG: 100 SOLUTION ORAL at 19:53

## 2018-05-27 NOTE — ASSESSMENT & PLAN NOTE
Does not seem to be in excerbation  Monitor in and out fluids  Daily weights  Start on lasix  Cardiology consult

## 2018-05-27 NOTE — RESPIRATORY THERAPY NOTE
RT Protocol Note  Raoul Bermeo 80 y o  female MRN: [id number]  Unit/Bed#: CW3 340-01 Encounter: [id number]    Assessment    Active Problems:    Subdural hemorrhage    Alzheimer disease    CHF (congestive heart failure)    HTN (hypertension)    Atrial fibrillation    ANABEL (acute kidney injury)    SAH (subarachnoid hemorrhage)    Open fracture of hip (HCC)      Home Pulmonary Medications:  symbicort       Past Medical History:   Diagnosis Date    A-fib ([facility] )     Alzheimer's dementia     CHF (congestive heart failure) ([facility] )     Gout     Hypertension     Renal disorder      Social History     Social History    Marital status:      Spouse name: N/A    Number of children: N/A    Years of education: N/A     Social History Main Topics    Smoking status: Never Smoker    Smokeless tobacco: Never Used    Alcohol use No    Drug use: No    Sexual activity: Not on file     Other Topics Concern    Not on file     Social History Narrative    No narrative on file       Subjective    Subjective Data: n/a    Objective    Physical Exam:   Assessment Type: Assess only  General Appearance: Awake  Respiratory Pattern: Normal  Chest Assessment: Chest expansion symmetrical  Bilateral Breath Sounds: Clear  Cough: Non-productive  O2 Device: ra    Vitals:  Blood pressure 134/97, pulse 79, temperature 98 1 °F (36 7 °C), temperature source Axillary, resp  rate 20, SpO2 93 %  Imaging and other studies: I have personally reviewed pertinent films in PACS    O2 Device: ra     Plan    Respiratory Plan: No distress/Pulmonary history        Resp Comments: pt evaluated per protocol  pt is lying in bed showing no signs of resp difficulty  pt has no hist of any lung disease  pt bs= clear throughout  pt sats on rma=98%  pt has no indication for nebs at this time, will change to prn for family indicates that she does wheeze on exertion  cxr shows chf  will continue to monitor pt

## 2018-05-27 NOTE — ASSESSMENT & PLAN NOTE
She was admitted in January, she is not on any anticoagulation at this time  She was off a/c due to recurrent falls  Would need to go on lovenox for now

## 2018-05-27 NOTE — ED PROVIDER NOTES
History  Chief Complaint   Patient presents with    Fall     Patient was walking to bathroom, lost footing and fell to the left  Left hip pain  Witnessed fall no head injury no LOC     Patient presents from [facility] after mechanical fall witnessed by staff  She complains of left hip pain, was unable to stand after the fall due to the hip pain and has shortening and rotation of the left leg  No prior h/o left hip fracture or surgery  Staff denies that she struck her head, had syncope or other complaints  Patient has moderate dementia and is a poor historian  GCS 15  Denies visual change, LH/dizziness, HA, neck or back pain, CP, SOB, abdominal pain, n/v  12 system ROS o/w negative  History provided by:  Patient, EMS personnel, medical records and nursing home  Fall   Mechanism of injury: fall    Injury location:  Pelvis  Pelvic injury location:  L hip  Incident location:  halfway  Time since incident:  30 minutes  Arrived directly from scene: yes    Fall:     Fall occurred:  Standing    Impact surface:  Hard floor    Point of impact: left hip      Entrapped after fall: no    Protective equipment: none    Suspicion of alcohol use: no    Suspicion of drug use: no    Prior to arrival data:     Bystander interventions:  None    Patient ambulatory at scene: no      Blood loss:  None    Responsiveness at scene:  Alert    Orientation at scene:  Person    Loss of consciousness: no      Amnesic to event: no      Airway interventions:  None    Immobilization:  None    Airway condition since incident:  Stable    Breathing condition since incident:  Stable    Circulation condition since incident:  Stable    Mental status condition since incident:  Stable    Disability condition since incident:  Stable  Associated symptoms: no abdominal pain, no back pain, no chest pain, no difficulty breathing, no headaches, no loss of consciousness, no nausea, no neck pain and no vomiting    Risk factors: no anticoagulation therapy and no hemophilia        Prior to Admission Medications   Prescriptions Last Dose Informant Patient Reported? Taking?    Magnesium 65 MG TABS 5/26/2018 at Unknown time  Yes Yes   Sig: Take 65 mg by mouth daily   Melatonin-Pyridoxine (EQL MELATONIN/VITAMIN B-6) 5-1 MG TABS 5/26/2018 at Unknown time  Yes Yes   Sig: Take 1 tablet by mouth daily at bedtime   acetaminophen (TYLENOL) 325 mg tablet   Yes Yes   Sig: Take 650 mg by mouth every 6 (six) hours as needed for mild pain   allopurinol (ZYLOPRIM) 100 mg tablet 5/26/2018 at Unknown time  Yes Yes   Sig: Take 100 mg by mouth daily   atenolol (TENORMIN) 50 mg tablet 5/26/2018 at Unknown time  Yes Yes   Sig: Take 50 mg by mouth 2 (two) times a day   budesonide-formoterol (SYMBICORT) 160-4 5 mcg/act inhaler   Yes Yes   Sig: Inhale 2 puffs 2 (two) times a day   cholecalciferol (VITAMIN D3) 1,000 units tablet 5/26/2018 at Unknown time  Yes Yes   Sig: Take 2,000 Units by mouth daily   diphenoxylate-atropine (LOMOTIL) 2 5-0 025 mg per tablet   Yes Yes   Sig: Take 1 tablet by mouth every 6 (six) hours as needed for diarrhea   docusate sodium (COLACE) 100 mg capsule   Yes Yes   Sig: Take 100 mg by mouth 2 (two) times a day as needed for constipation   furosemide (LASIX) 20 mg tablet 5/26/2018 at 1700  Yes Yes   Sig: Take 60 mg by mouth 2 (two) times a day     guaiFENesin (ROBITUSSIN) 100 MG/5ML oral liquid   Yes Yes   Sig: Take 200 mg by mouth 4 (four) times a day   ipratropium-albuterol (DUO-NEB) 0 5-2 5 mg/3 mL 5/26/2018 at Unknown time  Yes Yes   Sig: Take 3 mL by nebulization 4 (four) times a day   moxifloxacin (AVELOX) 400 MG tablet   Yes Yes   Sig: Take 400 mg by mouth daily   nystatin (MYCOSTATIN) powder   Yes Yes   Sig: Apply 1 application topically 2 (two) times a day as needed   potassium chloride (K-DUR,KLOR-CON) 10 mEq tablet 5/26/2018 at Unknown time  Yes Yes   Sig: Take 10 mEq by mouth every other day     predniSONE 10 mg tablet   Yes Yes Sig: Take 40 mg by mouth daily     silver sulfadiazine (SILVADENE,SSD) 1 % cream   Yes Yes   Sig: Apply 1 application topically daily as needed for wound care   sulfamethoxazole-trimethoprim (BACTRIM DS) 800-160 mg per tablet   Yes Yes   Sig: Take 1 tablet by mouth every 12 (twelve) hours   valsartan-hydrochlorothiazide (DIOVAN-HCT) 320-25 MG per tablet   Yes No   Sig: Take 1 tablet by mouth daily      Facility-Administered Medications: None       Past Medical History:   Diagnosis Date    A-fib ([facility] )     Alzheimer's dementia     CHF (congestive heart failure) ([facility] )     Gout     Hypertension     Renal disorder        History reviewed  No pertinent surgical history  History reviewed  No pertinent family history  I have reviewed and agree with the history as documented  Social History   Substance Use Topics    Smoking status: Never Smoker    Smokeless tobacco: Never Used    Alcohol use No        Review of Systems   Constitutional: Negative for chills, diaphoresis and fever  HENT: Negative for rhinorrhea, sore throat and trouble swallowing  Respiratory: Negative for cough, chest tightness, shortness of breath and wheezing  Cardiovascular: Negative for chest pain, palpitations and leg swelling  Gastrointestinal: Negative for abdominal distention, abdominal pain, constipation, diarrhea, nausea and vomiting  Genitourinary: Negative for difficulty urinating, dysuria, flank pain, frequency, hematuria and urgency  Musculoskeletal: Positive for arthralgias (Left hip) and gait problem (Due to left hip pain/injury)  Negative for back pain, myalgias, neck pain and neck stiffness  Skin: Negative for pallor, rash and wound  Neurological: Negative for dizziness, loss of consciousness, weakness, light-headedness and headaches  Hematological: Negative for adenopathy  Psychiatric/Behavioral: Negative for confusion  The patient is not nervous/anxious      All other systems reviewed and are negative  Physical Exam  Physical Exam   Constitutional: She is oriented to person, place, and time  She appears well-developed and well-nourished  She appears distressed (Mildly)  HENT:   Head: Normocephalic and atraumatic  No dental trauma, no hemotympanum, no epistaxis or septal hematoma  Eyes: EOM are normal  Pupils are equal, round, and reactive to light  Neck: Normal range of motion  Neck supple  No midline cervical TTP, stepoff or deformity   Cardiovascular: Normal rate and regular rhythm  No murmur heard  Pulmonary/Chest: Effort normal and breath sounds normal  No respiratory distress  She exhibits no tenderness  Abdominal: Soft  Bowel sounds are normal  There is no tenderness  Obese   Musculoskeletal: She exhibits tenderness (Proximal left hip) and deformity (Proximal left hip)  She exhibits no edema  No midline vert TTP, no crepitus or step-offs  Decreased range of motion of left hip, with shortening and external rotation  Neurological: She is alert and oriented to person, place, and time  She has normal reflexes  No cranial nerve deficit or sensory deficit  She exhibits normal muscle tone  Skin: Skin is warm and dry  Capillary refill takes less than 2 seconds  She is not diaphoretic  Psychiatric: She has a normal mood and affect  Her behavior is normal  Thought content normal    Vitals reviewed        Vital Signs  ED Triage Vitals   Temperature Pulse Respirations Blood Pressure SpO2   05/27/18 0724 05/27/18 0722 05/27/18 0722 05/27/18 0724 05/27/18 0722   97 6 °F (36 4 °C) 86 20 (!) 186/109 95 %      Temp Source Heart Rate Source Patient Position - Orthostatic VS BP Location FiO2 (%)   05/27/18 0724 05/27/18 0724 05/27/18 0724 -- --   Temporal Monitor Lying        Pain Score       05/27/18 0743       7           Vitals:    05/27/18 0905 05/27/18 1005 05/27/18 1105 05/27/18 1205   BP: (!) 156/107 152/88 148/93 134/87   Pulse: 95 93 90 78   Patient Position - Orthostatic VS: Lying Lying Lying Lying       Visual Acuity  Visual Acuity      Most Recent Value   L Pupil Size (mm)  3   R Pupil Size (mm)  3          ED Medications  Medications   fentanyl citrate (PF) 100 MCG/2ML **AcuDose Override Pull** (75 mcg  Given 5/27/18 0728)   fentanyl citrate (PF) 100 MCG/2ML 75 mcg (75 mcg Intravenous Given 5/27/18 0910)   traMADol (ULTRAM) tablet 100 mg (100 mg Oral Given 5/27/18 1022)       Diagnostic Studies  Results Reviewed     Procedure Component Value Units Date/Time    [place] draw [[id number]] Collected:  05/27/18 0730    Lab Status: In process Specimen:  Blood Updated:  05/27/18 0901    Narrative: The following orders were created for panel order [place] draw  Procedure                               Abnormality         Status                     ---------                               -----------         ------                     Kb Blase Top on UXZP[[id number]]                            Final result               Gold top on SGDK[[id number]]                                  In process                 Green / Yellow tube on IWFU[[id number]]                       Final result               Green / Black tube on AKYU[[id number]]                        In process                 Lavender Top 3 ml on HPZS[[id number]]                         Final result               Lavender Top 7ml on QJJG[[id number]]                          Final result                 Please view results for these tests on the individual orders  CBC and differential [[id number]]  (Abnormal) Collected:  05/27/18 0730    Lab Status:  Final result Specimen:  Blood from Arm, Right Updated:  05/27/18 0812     WBC 13 19 (H) Thousand/uL      RBC 3 73 (L) Million/uL      Hemoglobin 11 3 (L) g/dL      Hematocrit 34 8 %      MCV 93 fL      MCH 30 3 pg      MCHC 32 5 g/dL      RDW 14 6 %      MPV 10 1 fL      Platelets 021 Thousands/uL     Narrative: This is an appended report    These results have been appended to a previously verified report  Comprehensive metabolic panel [[id number]]  (Abnormal) Collected:  05/27/18 0730    Lab Status:  Final result Specimen:  Blood from Arm, Right Updated:  05/27/18 0810     Sodium 144 mmol/L      Potassium 3 9 mmol/L      Chloride 105 mmol/L      CO2 30 mmol/L      Anion Gap 9 mmol/L      BUN 54 (H) mg/dL      Creatinine 2 48 (H) mg/dL      Glucose 108 mg/dL      Calcium 8 6 mg/dL      AST 20 U/L      ALT 29 U/L      Alkaline Phosphatase 121 (H) U/L      Total Protein 7 7 g/dL      Albumin 3 7 g/dL      Total Bilirubin 0 40 mg/dL      eGFR 17 ml/min/1 73sq m     Narrative:         National Kidney Disease Education Program recommendations are as follows:  GFR calculation is accurate only with a steady state creatinine  Chronic Kidney disease less than 60 ml/min/1 73 sq  meters  Kidney failure less than 15 ml/min/1 73 sq  meters  Protime-INR [[id number]]  (Normal) Collected:  05/27/18 0730    Lab Status:  Final result Specimen:  Blood from Arm, Right Updated:  05/27/18 0805     Protime 13 2 seconds      INR 1 06    APTT [[id number]]  (Normal) Collected:  05/27/18 0730    Lab Status:  Final result Specimen:  Blood from Arm, Right Updated:  05/27/18 0805     PTT 24 seconds                  CT hip left without contrast   Final Result by Bryant De Leon DO (05/27 3492)   Comminuted, intertrochanteric fracture of the proximal left femur  Workstation performed: [id number]         XR chest 1 view portable   Final Result by Bryant De Leon DO (05/27 9635)   Diminished inspiration  Mild vascular congestion  Workstation performed: [id number]         XR hip/pelv 2-3 vws left   Final Result by Bryant De Leon DO (05/27 2868)   Intertrochanteric fracture left femur        Immediate reading was not called, given the patient has had a subsequent and follow-up MRI of the left hip for further evaluation      Workstation performed: [id number]                    Procedures  Procedures       Phone Contacts  ED Phone Contact    ED Course  ED Course as of May 27 7281   Sun May 27, 2018   0840 Baseline  Creatinine: (!) 2 48   0843 Case d/w [facility] for transfer  Will speak with Ortho in [facility] first      0906 Patient complains of pain  Will treat  Still awaiting Ortho return call  1898 [facility] contacted again  Still awaiting Ortho return call  2497 Results discussed with daughter who was not present in the ED  Still awaiting accepting physician for transfer  [address] [place] again  Continued delay caused by Ortho ([facility] and [facility] at this point) not returning pages  1024 Dr Ortega Pair accepted  Case d/w Dr [address] Hardy for medical admission  Additional delay will occur due to lack of beds available  PAC will arrange transportation once a bed becomes available  MDM  Number of Diagnoses or Management Options  Diagnosis management comments: DDx: Fall with left hip pain/deformity - proximal femur fracture, hip dislocation, doubt pelvis fracture, head injury or spinal injury  A/P: Will check left hip x-rays, preop labs and EKG, likely CT that hip, treat symptoms, admit         Amount and/or Complexity of Data Reviewed  Clinical lab tests: reviewed and ordered  Tests in the radiology section of CPT®: ordered and reviewed  Obtain history from someone other than the patient: yes (EMS)  Review and summarize past medical records: yes  Independent visualization of images, tracings, or specimens: yes      CritCare Time    Disposition  Final diagnoses:   Closed intertrochanteric fracture of hip, left, initial encounter ([facility] )   Fall from standing, initial encounter   CKD (chronic kidney disease)     Time reflects when diagnosis was documented in both MDM as applicable and the Disposition within this note     Time User Action Codes Description Comment    5/27/2018 10:26 AM Oniel Dalal [U44 286N] Closed intertrochanteric fracture of hip, left, initial encounter ([facility] )     5/27/2018 10:26 AM Oniel Wynn Add [W19 XXXA] Fall from standing, initial encounter     5/27/2018 10:26 AM Shamar Morrison Add [N18 9] CKD (chronic kidney disease)       ED Disposition     ED Disposition Condition Comment    Transfer to Another [place] [address] should be transferred out to [facility] under the care of Dr Baljit Rankin for Ortho surgery  MD Documentation      Most Recent Value   Accepting Physician  Dr Andrew Henry Name, [facility]   Sending MD  Dr Dougie Bar      RN Documentation      Most [facility] Name, [address]   [facility]   Medications Reviewed with Next Provider of Service  Yes   Transport Mode  Ambulance   Level of Care  Advanced life support   Copies of Medical Records Sent  History and Physical, Progress note, Orders, Transfer form, Nursing note, Radiology, EKG, Labs, Med Rec form      Follow-up Information    None         Patient's Medications   Discharge Prescriptions    No medications on file     No discharge procedures on file      ED Provider  Electronically Signed by           Alli Huitron DO  05/27/18 8841

## 2018-05-27 NOTE — ED NOTES
ecg completed [facility] [id number] [address]  05/27/18 0855

## 2018-05-27 NOTE — TRAUMA DOCUMENTATION
Family at bedside with patient, updated on pending transfer  Call bell within reach, side rails up x2

## 2018-05-27 NOTE — H&P
H&P- Bonita Gonzalez 11/23/1931, 80 y o  female MRN: [id number]    Unit/Bed#: CW3 340-01 Encounter: [id number]    Primary Care Provider: No primary care provider on file  Date and time admitted to hospital: 5/27/2018  4:41 PM        Open fracture of hip [facility])   Assessment & Plan    [facility] home after mechanical fall witnessed by staff   She complains of left hip pain, was unable to stand after the fall due to the hip pain and has shortening and rotation of the left leg  Will obtain orthopedic consult        SAH (subarachnoid hemorrhage)   Assessment & Plan    Has history of SAH in the past    Continue to monitor        ANABEL (acute kidney injury)   Assessment & Plan    Patient has elevated creatinine   Will give gentle hydration        Atrial fibrillation   Assessment & Plan     Rate controlled, continue with atenolol 50 mg   Not on a/c due to falls  Restart after surgery          HTN (hypertension)   Assessment & Plan    Continue with home medications        CHF (congestive heart failure)   Assessment & Plan    Does not seem to be in excerbation  Monitor in and out fluids  Daily weights  Start on lasix  Cardiology consult        Alzheimer disease   Assessment & Plan    Patient has alzheimer's disease continue supportive care and monitor closely        Subdural hemorrhage   Assessment & Plan    She was admitted in January, she is not on any anticoagulation at this time  She was off a/c due to recurrent falls  Would need to go on lovenox for now              VTE Prophylaxis: Enoxaparin (Lovenox)  / sequential compression device   Code Status: full code  POLST: POLST is not applicable to this patient  Discussion with family: daughter at bedside    Anticipated Length of Stay:  Patient will be admitted on an Inpatient basis with an anticipated length of stay of  More than 2 midnights     Justification for Hospital Stay: due to hip fracture, need medical optimization before surgery     Total Time for Visit, including Counseling / Coordination of Care: 45 minutes  Greater than 50% of this total time spent on direct patient counseling and coordination of care  Chief Complaint:   fall    History of Present Illness:    Tamika Najera is a 80 y o  female who presents with fall with hip fracture  [facility], complaining of left hip pain  Xray of hip and subsequent CT scan shows she has hip fracture  She was transferred to [place] for orthopedics surgery  She has multiple medical problems including a  Fib, dCHF, recent pneumonia/bronchitis, UTI, alzhiemer's dementia and ANABEL  She needs medical optimization before surgical intervention  Review of Systems:    Review of Systems   Constitutional: Negative  HENT: Negative  Eyes: Negative  Respiratory: Negative  Cardiovascular: Positive for leg swelling  Gastrointestinal: Negative  Endocrine: Negative  Genitourinary: Negative  Musculoskeletal: Negative  Allergic/Immunologic: Negative  Neurological: Negative  Hematological: Negative  Psychiatric/Behavioral: Negative  Past Medical and Surgical History:     Past Medical History:   Diagnosis Date    A-fib [facility])     Alzheimer's dementia     CHF [facility]congestive heart failure) ([facility] [address] )     Gout     Hypertension     Renal disorder        No past surgical history on file  Meds/Allergies:    Prior to Admission medications    Medication Sig Start Date End Date Taking?  Authorizing Provider   allopurinol (ZYLOPRIM) 100 mg tablet Take 100 mg by mouth daily    Historical Provider, MD   atenolol (TENORMIN) 50 mg tablet Take 50 mg by mouth 2 (two) times a day    Historical Provider, MD   budesonide-formoterol (SYMBICORT) 160-4 5 mcg/act inhaler Inhale 2 puffs 2 (two) times a day    Historical Provider, MD   cholecalciferol (VITAMIN D3) 1,000 units tablet Take 2,000 Units by mouth daily    Historical Provider, MD   diphenoxylate-atropine (LOMOTIL) 2 5-0 025 mg per tablet Take 1 tablet by mouth every 6 (six) hours as needed for diarrhea    Historical Provider, MD   docusate sodium (COLACE) 100 mg capsule Take 100 mg by mouth 2 (two) times a day as needed for constipation    Historical Provider, MD   furosemide (LASIX) 20 mg tablet Take 60 mg by mouth 2 (two) times a day      Historical Provider, MD   guaiFENesin (ROBITUSSIN) 100 MG/5ML oral liquid Take 200 mg by mouth 4 (four) times a day    Historical Provider, MD   ipratropium-albuterol (DUO-NEB) 0 5-2 5 mg/3 mL Take 3 mL by nebulization 4 (four) times a day    Historical Provider, MD   Magnesium 65 MG TABS Take 65 mg by mouth daily    Historical Provider, MD   Melatonin-Pyridoxine (EQL MELATONIN/VITAMIN B-6) 5-1 MG TABS Take 1 tablet by mouth daily at bedtime    Historical Provider, MD   moxifloxacin (AVELOX) 400 MG tablet Take 400 mg by mouth daily    Historical Provider, MD   nystatin (MYCOSTATIN) powder Apply 1 application topically 2 (two) times a day as needed    Historical Provider, MD   potassium chloride (K-DUR,KLOR-CON) 10 mEq tablet Take 10 mEq by mouth every other day      Historical Provider, MD   predniSONE 10 mg tablet Take 40 mg by mouth daily      Historical Provider, MD   silver sulfadiazine (SILVADENE,SSD) 1 % cream Apply 1 application topically daily as needed for wound care    Historical Provider, MD   sulfamethoxazole-trimethoprim (BACTRIM DS) 800-160 mg per tablet Take 1 tablet by mouth every 12 (twelve) hours    Historical Provider, MD   acetaminophen (TYLENOL) 325 mg tablet Take 650 mg by mouth every 6 (six) hours as needed for mild pain  5/27/18  Historical Provider, MD   allopurinol (ZYLOPRIM) 300 mg tablet Take 300 mg by mouth daily  5/27/18  Historical Provider, MD   furosemide (LASIX) 40 mg tablet Take 20 mg by mouth daily    5/27/18  Historical Provider, MD   Melatonin-Pyridoxine ER (MELATONEX) 3-10 MG TBCR Take 1 tablet by mouth  5/27/18  Historical Provider, MD   valsartan-hydrochlorothiazide (DIOVAN-HCT) 320-25 MG per tablet Take 1 tablet by mouth daily  5/27/18  Historical Provider, MD SIEGEL have reviewed home medications with patient personally  Allergies: Allergies   Allergen Reactions    Penicillins     Rivastigmine        Social History:     Marital Status:    Occupation:   Patient Pre-hospital Living Situation: [place]   Patient Pre-hospital Level of Mobility: able to ambulate at nursing home  Patient Pre-hospital Diet Restrictions: low sodium and fluid restriction  Substance Use History:   History   Alcohol Use No     History   Smoking Status    Never Smoker   Smokeless Tobacco    Never Used     History   Drug Use No       Family History:    non-contributory    Physical Exam:     Vitals:   Blood Pressure: 134/97 (05/27/18 1647)  Pulse: 79 (05/27/18 1647)  Temperature: 98 1 °F (36 7 °C) (05/27/18 1647)  Temp Source: Axillary (05/27/18 1647)  Respirations: 20 (05/27/18 1647)  SpO2: 93 % (05/27/18 1647)    Physical Exam   Constitutional: She appears well-developed and well-nourished  No distress  HENT:   Head: Normocephalic and atraumatic  Right Ear: External ear normal    Left Ear: External ear normal    Nose: Nose normal    Mouth/Throat: Oropharynx is clear and moist  No oropharyngeal exudate  Eyes: Conjunctivae and EOM are normal  Pupils are equal, round, and reactive to light  Left eye exhibits no discharge  No scleral icterus  Neck: Normal range of motion  Neck supple  No JVD present  No tracheal deviation present  Cardiovascular: Normal rate and regular rhythm  Murmur heard  Pulmonary/Chest: No stridor  She is in respiratory distress  She has wheezes  She has no rales  She exhibits no tenderness  Abdominal: Soft  Bowel sounds are normal  She exhibits no distension and no mass  There is no tenderness  Musculoskeletal: She exhibits edema and deformity  She exhibits no tenderness  Lymphadenopathy:     She has no cervical adenopathy     Neurological: She is alert  Coordination abnormal    Skin: Skin is warm and dry  No rash noted  She is not diaphoretic  No erythema  No pallor  Nursing note and vitals reviewed  Additional Data:     Lab Results: I have personally reviewed pertinent reports  Results from last 7 days  Lab Units 05/27/18  0730   WBC Thousand/uL 13 19*   HEMOGLOBIN g/dL 11 3*   HEMATOCRIT % 34 8   PLATELETS Thousands/uL 330   LYMPHO PCT % 7*   MONO PCT MAN % 11   EOSINO PCT MANUAL % 0       Results from last 7 days  Lab Units 05/27/18  0730   SODIUM mmol/L 144   POTASSIUM mmol/L 3 9   CHLORIDE mmol/L 105   CO2 mmol/L 30   BUN mg/dL 54*   CREATININE mg/dL 2 48*   CALCIUM mg/dL 8 6   TOTAL PROTEIN g/dL 7 7   BILIRUBIN TOTAL mg/dL 0 40   ALK PHOS U/L 121*   ALT U/L 29   AST U/L 20   GLUCOSE RANDOM mg/dL 108       Results from last 7 days  Lab Units 05/27/18  0730   INR  1 06               Imaging: I have personally reviewed pertinent reports  No orders to display       EKG, Pathology, and Other Studies Reviewed on Admission:   · EKG:  Atrial fibrillation with no st, t wave abnormality    Allscripts / Epic Records Reviewed: Yes     ** Please Note: This note has been constructed using a voice recognition system   **

## 2018-05-27 NOTE — ED PROCEDURE NOTE
PROCEDURE  ECG 12 Lead Documentation  Date/Time: 5/27/2018 8:15 AM  Performed by: Martha Goss by: Dileep Mckeon     Indications / Diagnosis:  Pre-op  ECG reviewed by me, the ED Provider: yes    Patient location:  ED  Interpretation:     Interpretation: abnormal    Quality:     Tracing quality:  Limited by artifact  Rate:     ECG rate:  82    ECG rate assessment: normal    Rhythm:     Rhythm: atrial fibrillation    Ectopy:     Ectopy: none    ST segments:     ST segments:  Normal  T waves:     T waves: normal           Martha Jarvis DO  05/27/18 0815

## 2018-05-27 NOTE — PLAN OF CARE
Problem: Potential for Falls  Goal: Patient will remain free of falls  INTERVENTIONS:  - Assess patient frequently for physical needs  -  Identify cognitive and physical deficits and behaviors that affect risk of falls    -  [place] fall precautions as indicated by assessment   - Educate patient/family on patient safety including physical limitations  - Instruct patient to call for assistance with activity based on assessment  - Modify environment to reduce risk of injury  - Consider OT/PT consult to assist with strengthening/mobility   Outcome: Progressing      Problem: Prexisting or High Potential for Compromised Skin Integrity  Goal: Skin integrity is maintained or improved  INTERVENTIONS:  - Identify patients at risk for skin breakdown  - Assess and monitor skin integrity  - Assess and monitor nutrition and hydration status  - Monitor labs (i e  albumin)  - Assess for incontinence   - Turn and reposition patient  - Assist with mobility/ambulation  - Relieve pressure over bony prominences  - Avoid friction and shearing  - Provide appropriate hygiene as needed including keeping skin clean and dry  - Evaluate need for skin moisturizer/barrier cream  - Collaborate with interdisciplinary team (i e  Nutrition, Rehabilitation, etc )   - Patient/family teaching   Outcome: Progressing      Problem: PAIN - ADULT  Goal: Verbalizes/displays adequate comfort level or baseline comfort level  Interventions:  - Encourage patient to monitor pain and request assistance  - Assess pain using appropriate pain scale  - Administer analgesics based on type and severity of pain and evaluate response  - Implement non-pharmacological measures as appropriate and evaluate response  - Consider cultural and social influences on pain and pain management  - Notify physician/advanced practitioner if interventions unsuccessful or patient reports new pain  Outcome: Progressing      Problem: INFECTION - ADULT  Goal: Absence or prevention of progression during hospitalization  INTERVENTIONS:  - Assess and monitor for signs and symptoms of infection  - Monitor lab/diagnostic results  - Monitor all insertion sites, i e  indwelling lines, tubes, and drains  - Monitor endotracheal (as able) and nasal secretions for changes in amount and color  - [place] appropriate cooling/warming therapies per order  - Administer medications as ordered  - Instruct and encourage patient and family to use good hand hygiene technique  - Identify and instruct in appropriate isolation precautions for identified infection/condition  Outcome: Progressing    Goal: Absence of fever/infection during neutropenic period  INTERVENTIONS:  - Monitor WBC  - Implement neutropenic guidelines  Outcome: Progressing      Problem: SAFETY ADULT  Goal: Maintain or return to baseline ADL function  INTERVENTIONS:  -  Assess patient's ability to carry out ADLs; assess patient's baseline for ADL function and identify physical deficits which impact ability to perform ADLs (bathing, care of mouth/teeth, toileting, grooming, dressing, etc )  - Assess/evaluate cause of self-care deficits   - Assess range of motion  - Assess patient's mobility; develop plan if impaired  - Assess patient's need for assistive devices and provide as appropriate  - Encourage maximum independence but intervene and supervise when necessary  ¯ Involve family in performance of ADLs  ¯ Assess for home care needs following discharge   ¯ Request OT consult to assist with ADL evaluation and planning for discharge  ¯ Provide patient education as appropriate  Outcome: Progressing    Goal: Maintain or return mobility status to optimal level  INTERVENTIONS:  - Assess patient's baseline mobility status (ambulation, transfers, stairs, etc )    - Identify cognitive and physical deficits and behaviors that affect mobility  - Identify mobility aids required to assist with transfers and/or ambulation (gait belt, sit-to-stand, lift, walker, cane, etc )  - [place] fall precautions as indicated by assessment  - Record patient progress and toleration of activity level on Mobility SBAR; progress patient to next Phase/Stage  - Instruct patient to call for assistance with activity based on assessment  - Request Rehabilitation consult to assist with strengthening/weightbearing, etc   Outcome: Progressing      Problem: DISCHARGE PLANNING  Goal: Discharge to home or other facility with appropriate resources  INTERVENTIONS:  - Identify barriers to discharge w/patient and caregiver  - Arrange for needed discharge resources and transportation as appropriate  - Identify discharge learning needs (meds, wound care, etc )  - Arrange for interpretive services to assist at discharge as needed  - Refer to Case Management Department for coordinating discharge planning if the patient needs post-hospital services based on physician/advanced practitioner order or complex needs related to functional status, cognitive ability, or social support system  Outcome: Progressing      Problem: Knowledge Deficit  Goal: Patient/family/caregiver demonstrates understanding of disease process, treatment plan, medications, and discharge instructions  Complete learning assessment and assess knowledge base    Interventions:  - Provide teaching at level of understanding  - Provide teaching via preferred learning methods  Outcome: Progressing

## 2018-05-27 NOTE — Clinical Note
Margarito Tabor should be transferred out to [facility] under the care of Dr Baljit Rankin for Ortho surgery

## 2018-05-27 NOTE — ASSESSMENT & PLAN NOTE
[facility] after mechanical fall witnessed by staff   She complains of left hip pain, was unable to stand after the fall due to the hip pain and has shortening and rotation of the left leg    Will obtain orthopedic consult

## 2018-05-27 NOTE — TRAUMA DOCUMENTATION
Family at bedside with patient at this time  Dr Pedersen E  [address] [place] [address],[facility]  [id number] updated family on patient condition  Physician waiting for a call back from PACS about patient transfer

## 2018-05-28 ENCOUNTER — ANESTHESIA EVENT (INPATIENT)
Dept: PERIOP | Facility: HOSPITAL | Age: 83
DRG: 481 | End: 2018-05-28
Payer: MEDICARE

## 2018-05-28 ENCOUNTER — ANESTHESIA (INPATIENT)
Dept: PERIOP | Facility: HOSPITAL | Age: 83
DRG: 481 | End: 2018-05-28
Payer: MEDICARE

## 2018-05-28 ENCOUNTER — APPOINTMENT (INPATIENT)
Dept: RADIOLOGY | Facility: HOSPITAL | Age: 83
DRG: 481 | End: 2018-05-28
Payer: MEDICARE

## 2018-05-28 PROBLEM — N18.30 STAGE 3 CHRONIC KIDNEY DISEASE (HCC): Status: ACTIVE | Noted: 2018-01-12

## 2018-05-28 PROBLEM — E87.0 HYPERNATREMIA: Status: ACTIVE | Noted: 2018-05-28

## 2018-05-28 LAB
ABO GROUP BLD: NORMAL
ALBUMIN SERPL BCP-MCNC: 2.8 G/DL (ref 3.5–5)
ALP SERPL-CCNC: 87 U/L (ref 46–116)
ALT SERPL W P-5'-P-CCNC: 20 U/L (ref 12–78)
ANION GAP SERPL CALCULATED.3IONS-SCNC: 6 MMOL/L (ref 4–13)
AST SERPL W P-5'-P-CCNC: 13 U/L (ref 5–45)
ATRIAL RATE: 82 BPM
BASOPHILS # BLD AUTO: 0.02 THOUSANDS/ΜL (ref 0–0.1)
BASOPHILS NFR BLD AUTO: 0 % (ref 0–1)
BILIRUB SERPL-MCNC: 0.58 MG/DL (ref 0.2–1)
BLD GP AB SCN SERPL QL: NEGATIVE
BUN SERPL-MCNC: 44 MG/DL (ref 5–25)
CALCIUM SERPL-MCNC: 8.2 MG/DL (ref 8.3–10.1)
CHLORIDE SERPL-SCNC: 112 MMOL/L (ref 100–108)
CO2 SERPL-SCNC: 29 MMOL/L (ref 21–32)
CREAT SERPL-MCNC: 2.02 MG/DL (ref 0.6–1.3)
EOSINOPHIL # BLD AUTO: 0.09 THOUSAND/ΜL (ref 0–0.61)
EOSINOPHIL NFR BLD AUTO: 1 % (ref 0–6)
ERYTHROCYTE [DISTWIDTH] IN BLOOD BY AUTOMATED COUNT: 14.9 % (ref 11.6–15.1)
GFR SERPL CREATININE-BSD FRML MDRD: 22 ML/MIN/1.73SQ M
GLUCOSE SERPL-MCNC: 92 MG/DL (ref 65–140)
HCT VFR BLD AUTO: 29.3 % (ref 34.8–46.1)
HGB BLD-MCNC: 9.4 G/DL (ref 11.5–15.4)
LYMPHOCYTES # BLD AUTO: 0.99 THOUSANDS/ΜL (ref 0.6–4.47)
LYMPHOCYTES NFR BLD AUTO: 10 % (ref 14–44)
MAGNESIUM SERPL-MCNC: 2.3 MG/DL (ref 1.6–2.6)
MCH RBC QN AUTO: 30.8 PG (ref 26.8–34.3)
MCHC RBC AUTO-ENTMCNC: 32.1 G/DL (ref 31.4–37.4)
MCV RBC AUTO: 96 FL (ref 82–98)
MONOCYTES # BLD AUTO: 1.07 THOUSAND/ΜL (ref 0.17–1.22)
MONOCYTES NFR BLD AUTO: 11 % (ref 4–12)
NEUTROPHILS # BLD AUTO: 7.82 THOUSANDS/ΜL (ref 1.85–7.62)
NEUTS SEG NFR BLD AUTO: 78 % (ref 43–75)
NRBC BLD AUTO-RTO: 0 /100 WBCS
PHOSPHATE SERPL-MCNC: 3 MG/DL (ref 2.3–4.1)
PLATELET # BLD AUTO: 240 THOUSANDS/UL (ref 149–390)
PMV BLD AUTO: 9.7 FL (ref 8.9–12.7)
POTASSIUM SERPL-SCNC: 3.8 MMOL/L (ref 3.5–5.3)
PROT SERPL-MCNC: 6 G/DL (ref 6.4–8.2)
QRS AXIS: -3 DEGREES
QRSD INTERVAL: 88 MS
QT INTERVAL: 406 MS
QTC INTERVAL: 474 MS
RBC # BLD AUTO: 3.05 MILLION/UL (ref 3.81–5.12)
RH BLD: POSITIVE
SODIUM SERPL-SCNC: 147 MMOL/L (ref 136–145)
SPECIMEN EXPIRATION DATE: NORMAL
T WAVE AXIS: 8 DEGREES
VENTRICULAR RATE: 82 BPM
WBC # BLD AUTO: 10.03 THOUSAND/UL (ref 4.31–10.16)

## 2018-05-28 PROCEDURE — 84100 ASSAY OF PHOSPHORUS: CPT | Performed by: INTERNAL MEDICINE

## 2018-05-28 PROCEDURE — 73560 X-RAY EXAM OF KNEE 1 OR 2: CPT

## 2018-05-28 PROCEDURE — 27245 TREAT THIGH FRACTURE: CPT | Performed by: ORTHOPAEDIC SURGERY

## 2018-05-28 PROCEDURE — 99222 1ST HOSP IP/OBS MODERATE 55: CPT | Performed by: INTERNAL MEDICINE

## 2018-05-28 PROCEDURE — C1713 ANCHOR/SCREW BN/BN,TIS/BN: HCPCS | Performed by: ORTHOPAEDIC SURGERY

## 2018-05-28 PROCEDURE — 86901 BLOOD TYPING SEROLOGIC RH(D): CPT | Performed by: ORTHOPAEDIC SURGERY

## 2018-05-28 PROCEDURE — 93010 ELECTROCARDIOGRAM REPORT: CPT | Performed by: INTERNAL MEDICINE

## 2018-05-28 PROCEDURE — 99232 SBSQ HOSP IP/OBS MODERATE 35: CPT | Performed by: INTERNAL MEDICINE

## 2018-05-28 PROCEDURE — 73502 X-RAY EXAM HIP UNI 2-3 VIEWS: CPT

## 2018-05-28 PROCEDURE — 86850 RBC ANTIBODY SCREEN: CPT | Performed by: ORTHOPAEDIC SURGERY

## 2018-05-28 PROCEDURE — C1769 GUIDE WIRE: HCPCS | Performed by: ORTHOPAEDIC SURGERY

## 2018-05-28 PROCEDURE — 0QS736Z REPOSITION LEFT UPPER FEMUR WITH INTRAMEDULLARY INTERNAL FIXATION DEVICE, PERCUTANEOUS APPROACH: ICD-10-PCS | Performed by: ORTHOPAEDIC SURGERY

## 2018-05-28 PROCEDURE — 83735 ASSAY OF MAGNESIUM: CPT | Performed by: INTERNAL MEDICINE

## 2018-05-28 PROCEDURE — 86900 BLOOD TYPING SEROLOGIC ABO: CPT | Performed by: ORTHOPAEDIC SURGERY

## 2018-05-28 PROCEDURE — 80053 COMPREHEN METABOLIC PANEL: CPT | Performed by: INTERNAL MEDICINE

## 2018-05-28 PROCEDURE — 99222 1ST HOSP IP/OBS MODERATE 55: CPT | Performed by: ORTHOPAEDIC SURGERY

## 2018-05-28 PROCEDURE — 85025 COMPLETE CBC W/AUTO DIFF WBC: CPT | Performed by: INTERNAL MEDICINE

## 2018-05-28 DEVICE — 11.0MM TI HELICAL BLADE 100MM-STERILE
Type: IMPLANTABLE DEVICE | Site: HIP | Status: NON-FUNCTIONAL
Removed: 2018-07-13

## 2018-05-28 DEVICE — 5.0MM TI LOCKING SCREW W/T25 STARDRIVE 34MM F/IM NAIL-STER
Type: IMPLANTABLE DEVICE | Site: HIP | Status: NON-FUNCTIONAL
Removed: 2018-07-13

## 2018-05-28 DEVICE — 10MM/130 DEG TI CANN TROCH FIXATION NAIL 170MM-STERILE
Type: IMPLANTABLE DEVICE | Site: HIP | Status: NON-FUNCTIONAL
Removed: 2018-07-13

## 2018-05-28 RX ORDER — SODIUM CHLORIDE, SODIUM LACTATE, POTASSIUM CHLORIDE, CALCIUM CHLORIDE 600; 310; 30; 20 MG/100ML; MG/100ML; MG/100ML; MG/100ML
75 INJECTION, SOLUTION INTRAVENOUS CONTINUOUS
Status: DISCONTINUED | OUTPATIENT
Start: 2018-05-28 | End: 2018-05-29

## 2018-05-28 RX ORDER — PROPOFOL 10 MG/ML
INJECTION, EMULSION INTRAVENOUS CONTINUOUS PRN
Status: DISCONTINUED | OUTPATIENT
Start: 2018-05-28 | End: 2018-05-28

## 2018-05-28 RX ORDER — LIDOCAINE HYDROCHLORIDE 10 MG/ML
INJECTION, SOLUTION INFILTRATION; PERINEURAL AS NEEDED
Status: DISCONTINUED | OUTPATIENT
Start: 2018-05-28 | End: 2018-05-28 | Stop reason: SURG

## 2018-05-28 RX ORDER — OXYCODONE HYDROCHLORIDE 5 MG/1
5 TABLET ORAL EVERY 4 HOURS PRN
Qty: 30 TABLET | Refills: 0 | Status: SHIPPED | OUTPATIENT
Start: 2018-05-28 | End: 2018-06-01 | Stop reason: HOSPADM

## 2018-05-28 RX ORDER — SUCCINYLCHOLINE CHLORIDE 20 MG/ML
INJECTION INTRAMUSCULAR; INTRAVENOUS AS NEEDED
Status: DISCONTINUED | OUTPATIENT
Start: 2018-05-28 | End: 2018-05-28 | Stop reason: SURG

## 2018-05-28 RX ORDER — ALBUMIN, HUMAN INJ 5% 5 %
SOLUTION INTRAVENOUS CONTINUOUS PRN
Status: DISCONTINUED | OUTPATIENT
Start: 2018-05-28 | End: 2018-05-28

## 2018-05-28 RX ORDER — FENTANYL CITRATE/PF 50 MCG/ML
25 SYRINGE (ML) INJECTION
Status: DISCONTINUED | OUTPATIENT
Start: 2018-05-28 | End: 2018-05-28

## 2018-05-28 RX ORDER — ONDANSETRON 2 MG/ML
INJECTION INTRAMUSCULAR; INTRAVENOUS AS NEEDED
Status: DISCONTINUED | OUTPATIENT
Start: 2018-05-28 | End: 2018-05-28 | Stop reason: SURG

## 2018-05-28 RX ORDER — PROPOFOL 10 MG/ML
INJECTION, EMULSION INTRAVENOUS AS NEEDED
Status: DISCONTINUED | OUTPATIENT
Start: 2018-05-28 | End: 2018-05-28 | Stop reason: SURG

## 2018-05-28 RX ORDER — FENTANYL CITRATE 50 UG/ML
INJECTION, SOLUTION INTRAMUSCULAR; INTRAVENOUS AS NEEDED
Status: DISCONTINUED | OUTPATIENT
Start: 2018-05-28 | End: 2018-05-28 | Stop reason: SURG

## 2018-05-28 RX ORDER — MAGNESIUM HYDROXIDE 1200 MG/15ML
LIQUID ORAL AS NEEDED
Status: DISCONTINUED | OUTPATIENT
Start: 2018-05-28 | End: 2018-05-28 | Stop reason: HOSPADM

## 2018-05-28 RX ORDER — ONDANSETRON 2 MG/ML
4 INJECTION INTRAMUSCULAR; INTRAVENOUS ONCE AS NEEDED
Status: DISCONTINUED | OUTPATIENT
Start: 2018-05-28 | End: 2018-05-28

## 2018-05-28 RX ADMIN — DEXAMETHASONE SODIUM PHOSPHATE 10 MG: 10 INJECTION INTRAMUSCULAR; INTRAVENOUS at 11:48

## 2018-05-28 RX ADMIN — MELATONIN TAB 3 MG 6 MG: 3 TAB at 21:23

## 2018-05-28 RX ADMIN — BUDESONIDE AND FORMOTEROL FUMARATE DIHYDRATE 2 PUFF: 160; 4.5 AEROSOL RESPIRATORY (INHALATION) at 08:49

## 2018-05-28 RX ADMIN — FENTANYL CITRATE 25 MCG: 50 INJECTION INTRAMUSCULAR; INTRAVENOUS at 13:11

## 2018-05-28 RX ADMIN — NYSTATIN 1 APPLICATION: 100000 POWDER TOPICAL at 17:26

## 2018-05-28 RX ADMIN — ALBUMIN HUMAN: 0.05 INJECTION, SOLUTION INTRAVENOUS at 11:32

## 2018-05-28 RX ADMIN — GUAIFENESIN 200 MG: 100 SOLUTION ORAL at 21:22

## 2018-05-28 RX ADMIN — CEFAZOLIN SODIUM 1000 MG: 1 SOLUTION INTRAVENOUS at 18:33

## 2018-05-28 RX ADMIN — LIDOCAINE HYDROCHLORIDE 60 MG: 10 INJECTION, SOLUTION INFILTRATION; PERINEURAL at 11:16

## 2018-05-28 RX ADMIN — SODIUM CHLORIDE, SODIUM LACTATE, POTASSIUM CHLORIDE, AND CALCIUM CHLORIDE: .6; .31; .03; .02 INJECTION, SOLUTION INTRAVENOUS at 12:19

## 2018-05-28 RX ADMIN — ATENOLOL 50 MG: 50 TABLET ORAL at 08:49

## 2018-05-28 RX ADMIN — PROPOFOL 80 MCG/KG/MIN: 10 INJECTION, EMULSION INTRAVENOUS at 11:17

## 2018-05-28 RX ADMIN — ONDANSETRON 4 MG: 2 INJECTION INTRAMUSCULAR; INTRAVENOUS at 11:48

## 2018-05-28 RX ADMIN — BUDESONIDE AND FORMOTEROL FUMARATE DIHYDRATE 2 PUFF: 160; 4.5 AEROSOL RESPIRATORY (INHALATION) at 21:23

## 2018-05-28 RX ADMIN — OXYCODONE HYDROCHLORIDE 5 MG: 5 SOLUTION ORAL at 08:50

## 2018-05-28 RX ADMIN — Medication 2000 MG: at 11:20

## 2018-05-28 RX ADMIN — PROPOFOL 70 MG: 10 INJECTION, EMULSION INTRAVENOUS at 11:16

## 2018-05-28 RX ADMIN — ACETAMINOPHEN 975 MG: 325 TABLET ORAL at 21:23

## 2018-05-28 RX ADMIN — FENTANYL CITRATE 50 MCG: 50 INJECTION, SOLUTION INTRAMUSCULAR; INTRAVENOUS at 11:20

## 2018-05-28 RX ADMIN — ENOXAPARIN SODIUM 30 MG: 30 INJECTION SUBCUTANEOUS at 17:26

## 2018-05-28 RX ADMIN — SUCCINYLCHOLINE CHLORIDE 80 MG: 20 INJECTION, SOLUTION INTRAMUSCULAR; INTRAVENOUS at 11:16

## 2018-05-28 NOTE — SPEECH THERAPY NOTE
Speech Language/Pathology    Order received for bedside swallow evaluation  Chart reviewed  Pt currently to remain NPO for OR later this morning  Will f/u and complete evaluation as able/appropriate

## 2018-05-28 NOTE — PROGRESS NOTES
Progress Note - Sadiq Perez 11/23/1931, 80 y o  female MRN: [id number]    Unit/Bed#: [facility] 930-01 Encounter: [id number]    Primary Care Provider: No primary care provider on file  Date and time admitted to hospital: 5/27/2018  4:41 PM    Hypernatremia   Assessment & Plan    Probably mild hypovolemic hypernatremia  Gentle IVF and monitor        SAH (subarachnoid hemorrhage)   Assessment & Plan    Has history of SAH in the past    Continue to monitor        Stage 3 chronic kidney disease   Assessment & Plan    Seems baseline Cr - monitor        Atrial fibrillation   Assessment & Plan     Rate controlled, continue with atenolol 50 mg   NO anticoagulation 2" falls prior to admission          CHF (congestive heart failure)   Assessment & Plan    euvolemic  Continue beta blocker  Cardiology consulted for pre op eval        Alzheimer disease   Assessment & Plan    Patient has alzheimer's disease continue supportive care and monitor closely        * Displaced intertrochanteric fracture of left femur, initial encounter for closed fracture [facility])   Assessment & Plan    Plan for OR by orthopedic team        Note reported Bronchitis at Nursing home but pt asymptomatic presently - will wean off steroids and STOP Bactrim    VTE Pharmacologic Prophylaxis: Enoxaparin (Lovenox)  Mechanical: Mechanical VTE prophylaxis in place  Patient Centered Rounds: I have performed bedside rounds with nursing staff today  Discussions with Specialists or Other Care Team Provider:     Education and Discussions with Family / Patient:     Time Spent for Care: More than 50% of total time spent on counseling and coordination of care as described above      Current Length of Stay: 1 day(s)    Current Patient Status: Inpatient   Certification Statement: The patient will continue to require additional inpatient hospital stay due to as above    Discharge Plan:    Code Status: Level 1 - Full Code      Subjective: nil acute    Objective: Vitals:   Temp (24hrs), Av 1 °F (36 7 °C), Min:97 °F (36 1 °C), Max:98 4 °F (36 9 °C)    HR:  [] 98  Resp:  [17-22] 18  BP: (112-155)/(68-96) 118/89  SpO2:  [91 %-100 %] 98 %  Body mass index is 36 73 kg/m²  Input and Output Summary (last 24 hours): Intake/Output Summary (Last 24 hours) at 18 1855  Last data filed at 18 1800   Gross per 24 hour   Intake          1819 25 ml   Output              580 ml   Net          1239 25 ml       Physical Exam   HENT:   Head: Normocephalic  Eyes: Pupils are equal, round, and reactive to light  Cardiovascular: Normal rate  Pulmonary/Chest: Effort normal    Abdominal: Soft  Musculoskeletal: Normal range of motion  Neurological: She is alert  Skin: There is pallor  Additional Data:     Labs:      Results from last 7 days  Lab Units 18  0519   WBC Thousand/uL 10 03   HEMOGLOBIN g/dL 9 4*   HEMATOCRIT % 29 3*   PLATELETS Thousands/uL 240   NEUTROS PCT % 78*   LYMPHS PCT % 10*   MONOS PCT % 11   EOS PCT % 1       Results from last 7 days  Lab Units 18  0519   SODIUM mmol/L 147*   POTASSIUM mmol/L 3 8   CHLORIDE mmol/L 112*   CO2 mmol/L 29   BUN mg/dL 44*   CREATININE mg/dL 2 02*   CALCIUM mg/dL 8 2*   TOTAL PROTEIN g/dL 6 0*   BILIRUBIN TOTAL mg/dL 0 58   ALK PHOS U/L 87   ALT U/L 20   AST U/L 13   GLUCOSE RANDOM mg/dL 92       Results from last 7 days  Lab Units 18  0730   INR  1 06       * I Have Reviewed All Lab Data Listed Above      Imaging:  Imaging Personally Reviewed by Myself Includes:     Cultures:   Blood Culture: No results found for: BLOODCX  Urine Culture: No results found for: URINECX  Sputum Culture: No components found for: SPUTUMCX  Wound Culture:   Lab Results   Component Value Date    WOUNDCULT 2+ Growth of Staphylococcus aureus (A) 02/15/2018       Last 24 Hours Medication List:     Current Facility-Administered Medications:  acetaminophen 975 mg Oral Q8H 7150 Gloriavisdebbie Walker MD    albuterol 2 5 mg Nebulization Q6H PRN Fuad Katz MD    allopurinol 100 mg Oral Daily Fuad Katz MD    aluminum-magnesium hydroxide-simethicone 30 mL Oral Q6H PRN Fuad Katz MD    atenolol 50 mg Oral Daily Fuad Katz MD    budesonide-formoterol 2 puff Inhalation BID Fuad Katz MD    cefazolin 1,000 mg Intravenous Q8H Jamal Badillo MD Last Rate: 1,000 mg (05/28/18 1833)   cholecalciferol 2,000 Units Oral Daily Fuad Katz MD    diphenoxylate-atropine 1 tablet Oral Q6H PRN Fuad Katz MD    docusate sodium 100 mg Oral BID PRN Fuad Katz MD    enoxaparin 30 mg Subcutaneous Q24H Jamal Badillo MD    guaiFENesin 200 mg Oral 4x Daily Fuad Katz MD    lactated ringers 75 mL/hr Intravenous Continuous Geri Rendon MD Last Rate: 75 mL/hr (05/27/18 2346)   lactated ringers 75 mL/hr Intravenous Continuous Jamal Badillo MD    melatonin 6 mg Oral HS Fuad Katz MD    nystatin 1 application Topical BID Fuad Katz MD    ondansetron 4 mg Intravenous Q6H PRN Fuad Katz MD    oxyCODONE 5 mg Oral Q6H PRN Fuad Katz MD    polyethylene glycol 17 g Oral Daily Fuad Katz MD    potassium chloride 10 mEq Oral Every Other Day Fuad Katz MD    [START ON 5/29/2018] predniSONE 30 mg Oral Daily Marichuy Tabor MD    senna 8 8 mg Oral Daily Fuad Katz MD    silver sulfadiazine 1 application Topical Daily PRN Fuad Katz MD    sodium chloride 30 mL/hr Intravenous Continuous Fuad Katz MD Last Rate: Stopped (05/27/18 2346)        Today, Patient Was Seen By: Marichuy Tabor MD    ** Please Note: Dragon 360 Dictation voice to text software may have been used in the creation of this document   **

## 2018-05-28 NOTE — PHYSICAL THERAPY NOTE
PT Cancellation:  PT orders received  Pt pending OR for IM nail left intertrochanteric femur fracture  Will f/u post op    Vladimir Noel PT

## 2018-05-28 NOTE — OCCUPATIONAL THERAPY NOTE
Occupational Therapy         Patient Name: Samantha Perdue  [facility]'P Date: 5/28/2018    PT IN OR - WILL DEFER AND ADDRESS OT NEEDS POST OP    Gina Tijerina OT

## 2018-05-28 NOTE — PROGRESS NOTES
Progress Note - Orthopedics   Strange Lipoma 80 y o  female MRN: [id number]  Unit/Bed#: [facility]      Subjective:    80 y  o female s/p fall with left IT fracture  Patient unable to give meaningful history  No family members present at bedside      Labs:    0  Lab Value Date/Time   HCT 29 3 (L) 05/28/2018 0519   HCT 34 8 05/27/2018 0730   HCT 32 0 (L) 01/14/2018 0536   HGB 9 4 (L) 05/28/2018 0519   HGB 11 3 (L) 05/27/2018 0730   HGB 10 4 (L) 01/14/2018 0536   INR 1 06 05/27/2018 0730   WBC 10 03 05/28/2018 0519   WBC 13 19 (H) 05/27/2018 0730   WBC 6 84 01/14/2018 0536       Meds:    Current Facility-Administered Medications:     acetaminophen (TYLENOL) tablet 975 mg, 975 mg, Oral, Q8H [address], Alma Winchester MD, 975 mg at 05/27/18 2113    albuterol inhalation solution 2 5 mg, 2 5 mg, Nebulization, Q6H PRN, Alma Winchester MD    allopurinol (ZYLOPRIM) tablet 100 mg, 100 mg, Oral, Daily, Alma Winchester MD    aluminum-magnesium hydroxide-simethicone (MYLANTA) 200-200-20 mg/5 mL oral suspension 30 mL, 30 mL, Oral, Q6H PRN, Alma Winchester MD    atenolol (TENORMIN) tablet 50 mg, 50 mg, Oral, Daily, Alma Winchester MD    budesonide-formoterol (SYMBICORT) 160-4 5 mcg/act inhaler 2 puff, 2 puff, Inhalation, BID, Alma Winchester MD, 2 puff at 05/27/18 1953    cholecalciferol (VITAMIN D3) tablet 2,000 Units, 2,000 Units, Oral, Daily, Alma Winchester MD    diphenoxylate-atropine (LOMOTIL) 2 5-0 025 mg per tablet 1 tablet, 1 tablet, Oral, Q6H PRN, Alma Winchester MD    docusate sodium (COLACE) capsule 100 mg, 100 mg, Oral, BID PRN, Alma Winchester MD    enoxaparin (LOVENOX) subcutaneous injection 30 mg, 30 mg, Subcutaneous, Q24H [address], Alma Winchester MD    guaiFENesin (ROBITUSSIN) oral solution 200 mg, 200 mg, Oral, 4x Daily, Alma Winchester MD, 200 mg at 05/27/18 1953    lactated ringers infusion, 75 mL/hr, Intravenous, Continuous, Mauricio Millan MD, Last Rate: 75 mL/hr at 05/27/18 7760, 75 mL/hr at 05/27/18 2346    melatonin tablet 6 mg, 6 mg, Oral, HS, Pascual Bernard MD, 6 mg at 05/27/18 2002    nystatin (MYCOSTATIN) powder 1 application, 1 application, Topical, BID, Pascual Bernard MD    ondansetron (ZOFRAN) injection 4 mg, 4 mg, Intravenous, Q6H PRN, Pascual Bernard MD    oxyCODONE (ROXICODONE) oral solution 5 mg, 5 mg, Oral, Q6H PRN, Psacual Bernard MD    polyethylene glycol (MIRALAX) packet 17 g, 17 g, Oral, Daily, Pascual Bernard MD    potassium chloride (K-DUR,KLOR-CON) CR tablet 10 mEq, 10 mEq, Oral, Every Other Day, Pascual Bernard MD    predniSONE tablet 40 mg, 40 mg, Oral, Daily, Pascual Bernard MD    senna 8 8 mg/5 mL oral syrup 8 8 mg, 8 8 mg, Oral, Daily, Pascual Bernard MD    silver sulfadiazine (SILVADENE,SSD) 1 % cream 1 application, 1 application, Topical, Daily PRN, Pascual Bernard MD    sodium chloride 0 9 % infusion, 30 mL/hr, Intravenous, Continuous, Pascual Bernard MD, Stopped at 05/27/18 2346    sulfamethoxazole-trimethoprim (BACTRIM) 400-80 mg per tablet 1 tablet, 1 tablet, Oral, Q12H [facility], Pascual Bernard MD, 1 tablet at 05/27/18 2156    Blood Culture:   No results found for: BLOODCX    Wound Culture:   Lab Results   Component Value Date    WOUNDCULT 2+ Growth of Staphylococcus aureus (A) 02/15/2018       Ins and Outs:  I/O last 24 hours: In: 170 5 [I V :170 5]  Out: 530 [Urine:530]          Physical:  Vitals:    05/28/18 0303   BP: 137/74   Pulse: 86   Resp: 20   Temp: 98 3 °F (36 8 °C)   SpO2: 100%     Musculoskeletal: left Lower Extremity  · Skin intact, LLE shortened and externally rotated  · TTP left hip  · Painful left hip motion  · Unable to comply with motor and sensory exam    _*_*_*_*_*_*_*_*_*_*_*_*_*_*_*_*_*_*_*_*_*_*_*_*_*_*_*_*_*_*_*_*_*_*_*_*_*_*_*_*_*    Assessment:    80 y  o female [facility] s/p fall with left intertrochanteric femur fracture      Plan:  · NWB LLE  · To OR for IM nail left intertrochanteric femur fracture when medically optimized    · Remain NPO  · Analgesics for pain  · Will need consent by family as patient unable  · Cardiology clearance pending  · PT/OT  · DVT ppx  · Dispo: Ortho will follow    Bruno Starr PA-C

## 2018-05-28 NOTE — ANESTHESIA PREPROCEDURE EVALUATION
Review of Systems/Medical History  Patient summary reviewed  Chart reviewed  No history of anesthetic complications     Cardiovascular  Hypertension , Dysrhythmias , atrial fibrillation, CHF (chronic diastolic - currently euvolemic) ,   Comment: Cardiac clearance: mod risk,  Pulmonary    Comment: Recently started on inhalers for episodes of bronchitis, daughter questions whether she has had aspiration when eating that has been causing this  No PNAs     GI/Hepatic  Negative GI/hepatic ROS          Chronic kidney disease stage 4,        Endo/Other  Negative endo/other ROS      GYN  Negative gynecology ROS          Hematology  Negative hematology ROS      Musculoskeletal  Gout,   Comment: Left femur fracture after mechanical fall      Neurology      Comment: SDH/SAH managed nonoperatively in Jan 2018 - no longer on anticoagulation Psychology       Comment: Alzheimer's dementia - pt unable to given hx       Physical Exam    Airway    Mallampati score: II         Dental   upper dentures and lower dentures,     Cardiovascular      Pulmonary      Other Findings  Pt pleasant, cooperative, not oriented to situation; denies pain     Lab Results   Component Value Date    WBC 10 03 05/28/2018    HGB 9 4 (L) 05/28/2018     05/28/2018     Lab Results   Component Value Date     (H) 05/28/2018    K 3 8 05/28/2018    BUN 44 (H) 05/28/2018    CREATININE 2 02 (H) 05/28/2018    GLUCOSE 92 05/28/2018     Lab Results   Component Value Date    PTT 24 05/27/2018      Lab Results   Component Value Date    INR 1 06 05/27/2018     Blood type O+/antibody neg  Lab Results   Component Value Date    HGBA1C 5 4 05/27/2018     Anesthesia Plan  ASA Score- 3     Anesthesia Type- general with ASA Monitors  Additional Monitors:   Airway Plan: ETT  Comment: TIVA  Risks specifically including POCD discussed with daughter  Plan Factors-    Induction- intravenous      Postoperative Plan-   Planned trial extubation    Informed Consent- Plan/risks discussed with: daughter Farida Jamison by phone  I personally reviewed this patient with the CRNA  Discussed and agreed on the Anesthesia Plan with the NADIRA Longo

## 2018-05-28 NOTE — DISCHARGE INSTRUCTIONS
Discharge Instructions - Orthopedics  Getachew Jones 80 y o  female MRN: [id number]  Unit/Bed#: Operating Room    Weight Bearing Status:                                           Weight bearing as tolerated left lower extremity     DVT prophylaxis:  Complete course of Lovenox as directed    Pain:  Continue analgesics as directed    Dressing Instructions:   Keep dressing clean, dry and intact until follow up appointment  PT/OT:  Continue PT/OT on outpatient basis as directed    Appt Instructions: If you do not have your appointment, please call the clinic at [phone] to f/u with Dr Fredis Arechiga in 2 weeks       Contact the office sooner if you experience any increased numbness/tingling in the extremities

## 2018-05-28 NOTE — CONSULTS
Orthopedics   Tyshawn Khoury 80 y o  female MRN: [id number]  Unit/Bed#: [facility] 930-01      Chief Complaint:   left hip pain    HPI:   80 y  o female with a PMH of Alzheimer's disease, a fib and CHF presenting as a [facility] a [facility]  She was diagnosed with a left intertrochanteric femur fracture transfer [facility]  Patient is unable given a complaint and there is no family members at the bedside with her  Review Of Systems:   · Skin: Normal  · Neuro: See HPI  · Musculoskeletal: See HPI  · 14 point review of systems negative except as stated above     Past Medical History:   Past Medical History:   Diagnosis Date    [facility]     Alzheimer's dementia     [facility]congest[facility] failure) ([facility] )     Gout     Hypertension     Renal disorder        Past Surgical History:   No past surgical history on file  Family History:  Family history reviewed and non-contributory  No family history on file  Social History:  Social History     Social History    Marital status:      Spouse name: N/A    Number of children: N/A    Years of education: N/A     Social History Main Topics    Smoking status: Never Smoker    Smokeless tobacco: Never Used    Alcohol use No    Drug use: No    Sexual activity: Not on file     Other Topics Concern    Not on file     Social History Narrative    No narrative on file       Allergies:    Allergies   Allergen Reactions    Penicillins     Rivastigmine            Labs:    0  Lab Value Date/Time   HCT 34 8 05/27/2018 0730   HCT 32 0 (L) 01/14/2018 0536   HCT 29 7 (L) 01/13/2018 0519   HGB 11 3 (L) 05/27/2018 0730   HGB 10 4 (L) 01/14/2018 0536   HGB 10 0 (L) 01/13/2018 0519   INR 1 06 05/27/2018 0730   WBC 13 19 (H) 05/27/2018 0730   WBC 6 84 01/14/2018 0536   WBC 5 75 01/13/2018 0519       Meds:    Current Facility-Administered Medications:     acetaminophen (TYLENOL) tablet 975 mg, 975 mg, Oral, Q8H [address], Sera Redding MD, 975 mg at 05/27/18 2113    albuterol inhalation solution 2 5 mg, 2 5 mg, Nebulization, Q6H PRN, MD Kanika Lobo  [START ON 5/28/2018] allopurinol (ZYLOPRIM) tablet 100 mg, 100 mg, Oral, Daily, Christopher Williamson MD    aluminum-magnesium hydroxide-simethicone (MYLANTA) 200-200-20 mg/5 mL oral suspension 30 mL, 30 mL, Oral, Q6H PRN, MD Kanika Lobo  [START ON 5/28/2018] atenolol (TENORMIN) tablet 50 mg, 50 mg, Oral, Daily, Christopher Williamson MD    budesonide-formoterol (SYMBICORT) 160-4 5 mcg/act inhaler 2 puff, 2 puff, Inhalation, BID, Christopher Williamson MD, 2 puff at 05/27/18 1953    [START ON 5/28/2018] cholecalciferol (VITAMIN D3) tablet 2,000 Units, 2,000 Units, Oral, Daily, Christopher Williamson MD    diphenoxylate-atropine (LOMOTIL) 2 5-0 025 mg per tablet 1 tablet, 1 tablet, Oral, Q6H PRN, Christopher Williamson MD    docusate sodium (COLACE) capsule 100 mg, 100 mg, Oral, BID PRN, MD Kanika Lobo  [START ON 5/28/2018] enoxaparin (LOVENOX) subcutaneous injection 30 mg, 30 mg, Subcutaneous, Q24H [facility], Christopher Williamson MD    guaiFENesin (ROBITUSSIN) oral solution 200 mg, 200 mg, Oral, 4x Daily, Christopher Williamson MD, 200 mg at 05/27/18 1953    [START ON 5/28/2018] lactated ringers infusion, 75 mL/hr, Intravenous, Continuous, Rosita Mckinney MD    melatonin tablet 6 mg, 6 mg, Oral, HS, Christopher Williamson MD, 6 mg at 05/27/18 2002    nystatin (MYCOSTATIN) powder 1 application, 1 application, Topical, BID, Christopher Williamson MD    ondansetron (ZOFRAN) injection 4 mg, 4 mg, Intravenous, Q6H PRN, Christopher Williamson MD    oxyCODONE (ROXICODONE) oral solution 5 mg, 5 mg, Oral, Q6H PRN, Christopher Williamson MD    [START ON 5/28/2018] polyethylene glycol (MIRALAX) packet 17 g, 17 g, Oral, Daily, MD Kanika Lobo [facility]  [START ON 5/28/2018] potassium chloride (K-DUR,KLOR-CON) CR tablet 10 mEq, 10 mEq, Oral, Every Other Day, Christopher Williamson MD    [START ON 5/28/2018] predniSONE tablet 40 mg, 40 mg, Oral, Daily, Magali Iverson MD  [employer]  Arndt Smoker ON 5/28/2018] senna 8 8 mg/5 mL oral syrup 8 8 mg, 8 8 mg, Oral, Daily, Magali Iverson MD    silver sulfadiazine (SILVADENE,SSD) 1 % cream 1 application, 1 application, Topical, Daily PRN, Magali Iverson MD    sodium chloride 0 9 % infusion, 30 mL/hr, Intravenous, Continuous, Magali Iverson MD, Last Rate: 30 mL/hr at 05/27/18 1805, 30 mL/hr at 05/27/18 1805    sulfamethoxazole-trimethoprim (BACTRIM) 400-80 mg per tablet 1 tablet, 1 tablet, Oral, Q12H [facility], Magali Iverson MD, 1 tablet at 05/27/18 2156    Blood Culture:   No results found for: BLOODCX    Wound Culture:   Lab Results   Component Value Date    WOUNDCULT 2+ Growth of Staphylococcus aureus (A) 02/15/2018       Ins and Outs:  No intake/output data recorded  Physical Exam:   /71 (BP Location: Left arm)   Pulse 85   Temp 98 3 °F (36 8 °C) (Oral)   Resp 20   Wt 86 9 kg (191 lb 9 3 oz)   SpO2 96%   BMI 37 42 kg/m²   Gen: AOx0  Only mumbles  HEENT: EOMI, eyes clear, moist mucus membranes, hearing intact  Respiratory: Bilateral chest rise  No audible wheezing found  Cardiovascular: Regular Rate and Rhythm  Abdomen: soft nontender/nondistended  Musculoskeletal: left lower extremity  · Skin intact, limb shortened and externally rotated  · Tender to palpation over hip  · Groans with hip motion  · Unable to comply with motor and sensory exam    Radiology:   I personally reviewed the films  X-rays left hip shows Intertrochanteric femur fracture    _*_*_*_*_*_*_*_*_*_*_*_*_*_*_*_*_*_*_*_*_*_*_*_*_*_*_*_*_*_*_*_*_*_*_*_*_*_*_*_*_*    Assessment:  80 y  o female status post fall with leftIntertrochanteric femur fracture    Plan:   · Non weight bearing left lower extremity  · Admitted to medicine who recommends cardiology consult  · Will reach out to the cardiology team to notify them that she has on the operating room schedule early tomorrow morning  · Patient is unable to give informed consent    Family members were called twice without any answer    · Analgesics for pain  · NPO at midnight  · To OR for IM nail femur fracture of Intertrochanteric femur fracture when medically optimized  · Dispo: Ortho will follow    Valdemar Arce MD

## 2018-05-28 NOTE — CONSULTS
Consultation - Cardiology   Torito Hancock 80 y o  female MRN: [id number]  Unit/Bed#: [facility] 930-01 Encounter: [id number]      Assessment:    1  Preoperative risk assessment  2  Atrial fibrillation  3  CKD  4  History of CVA  5  Left hip fracture  6  Advanced dementia    Plan: This is an 70-year-old female with a history of advanced dementia atrial fibrillation chronic kidney disease and CVA who presents after a reported mechanical fall  Noted to have left femur fracture  Scheduled to be taken to the OR  Patient has advanced dementia and is unable to communicate appropriately  Information gathered by discussion with daughter  She is euvolemic on exam   No murmurs appreciated  Telemetry shows relatively rate controlled atrial fibrillation  Given her age, risk factors and mobility status she is at least moderate risk for perioperative cardiovascular complication  However, further testing will would not change any management or significantly alter her risk profile  Continue with home dose of atenolol  History of Present Illness   Physician Requesting Consult: Shiela Blakely MD  Reason for Consult / Principal Problem: Preoperative risk assessment  HPI: Torito Hancock is a 80y o  year old female with a past medical history of alzheimer dementia, atrial fibrillation, heart failure, CKD and CVA who presented from the memory unit at an assisted living with a fall  In the ER noted to have a left femur fracture  Seen by orthopedics who have scheduled to take her to the OR  She has advanced dementia and information gathered by calling her daughter Nasim Ingram  Patient is unable to answer questions appropriately due to advanced dementia  Is not very active for the past one to two years  Does not climb stairs  Was admitted in January for recurrent falls and SDH  Anticoagulation was stopped at the time       Consults    Review of Systems:  Review of Systems   Unable to perform ROS: Dementia   Constitutional: Negative for fatigue and fever  Respiratory: Negative for cough and chest tightness  14 systems reviewed and negative with the exception of the above and the following    Historical Information   Past Medical History:   Diagnosis Date    [facility] )     Alzheimer's dementia     CH[facility]congestive heart failure) ([facility] )     Gout     Hypertension     Renal disorder      No past surgical history on file  History   Alcohol Use No     History   Drug Use No     History   Smoking Status    Never Smoker   Smokeless Tobacco    Never Used     Family History: non-contributory    Meds/Allergies   all current active meds have been reviewed  Allergies   Allergen Reactions    Penicillins     Rivastigmine        Objective   Vitals: Blood pressure 155/75, pulse 103, temperature 98 3 °F (36 8 °C), resp  rate 18, weight 85 3 kg (188 lb 0 8 oz), SpO2 97 %  , Body mass index is 36 73 kg/m² , Orthostatic Blood Pressures      Most Recent Value   Blood Pressure  155/75 filed at 05/28/2018 8344   Patient Position - Orthostatic VS  Lying filed at 05/28/2018 0303            Intake/Output Summary (Last 24 hours) at 05/28/18 0931  Last data filed at 05/28/18 6153   Gross per 24 hour   Intake            170 5 ml   Output              530 ml   Net           -359 5 ml       Invasive Devices     Peripheral Intravenous Line            Peripheral IV 05/27/18 Right Antecubital 1 day              Physical Exam   Constitutional: She appears cachectic  She appears distressed  HENT:   Head: Normocephalic and atraumatic  Neck: No JVD present  Cardiovascular: Normal rate  An irregularly irregular rhythm present  Exam reveals no gallop and no friction rub  No murmur heard  Pulmonary/Chest: Effort normal and breath sounds normal  No respiratory distress  She has no wheezes  Abdominal: Soft  Bowel sounds are normal  She exhibits no distension  Musculoskeletal: She exhibits no edema     Neurological:   AAO x 0   Skin: Skin is warm and dry  Lab Results:     No results found for: CKTOTAL, CKMB, CKMBINDEX, TROPONINI    Lab Results   Component Value Date    GLUCOSE 92 05/28/2018    CALCIUM 8 2 (L) 05/28/2018     (H) 05/28/2018    K 3 8 05/28/2018    CO2 29 05/28/2018     (H) 05/28/2018    BUN 44 (H) 05/28/2018    CREATININE 2 02 (H) 05/28/2018       Lab Results   Component Value Date    WBC 10 03 05/28/2018    HGB 9 4 (L) 05/28/2018    HCT 29 3 (L) 05/28/2018    MCV 96 05/28/2018     05/28/2018       Lab Results   Component Value Date    ALT 20 05/28/2018    AST 13 05/28/2018         Results from last 7 days  Lab Units 05/27/18  0730   INR  1 06     Imaging: I have personally reviewed pertinent reports        EKG: Atrial fibrillation - rate controlled

## 2018-05-28 NOTE — ANESTHESIA POSTPROCEDURE EVALUATION
Post-Op Assessment Note      CV Status:  Stable    Mental Status:  Alert and awake    Hydration Status:  Euvolemic    PONV Controlled:  Controlled    Airway Patency:  Patent    Post Op Vitals Reviewed: Yes          Staff: CRNA, Anesthesiologist           /68 (05/28/18 1239)    Temp 98 1 °F (36 7 °C) (05/28/18 1239)    Pulse 92 (05/28/18 1239)   Resp 22 (05/28/18 1239)    SpO2 96 % (05/28/18 1239)

## 2018-05-29 PROBLEM — N18.4 STAGE 4 CHRONIC KIDNEY DISEASE (HCC): Status: ACTIVE | Noted: 2018-01-12

## 2018-05-29 LAB
25(OH)D3 SERPL-MCNC: 39 NG/ML (ref 30–100)
ABO GROUP BLD: NORMAL
ALBUMIN SERPL BCP-MCNC: 3.1 G/DL (ref 3.5–5)
ALP SERPL-CCNC: 79 U/L (ref 46–116)
ALT SERPL W P-5'-P-CCNC: 14 U/L (ref 12–78)
ANION GAP SERPL CALCULATED.3IONS-SCNC: 6 MMOL/L (ref 4–13)
ANION GAP SERPL CALCULATED.3IONS-SCNC: 7 MMOL/L (ref 4–13)
AST SERPL W P-5'-P-CCNC: 16 U/L (ref 5–45)
BASOPHILS # BLD AUTO: 0.01 THOUSANDS/ΜL (ref 0–0.1)
BASOPHILS NFR BLD AUTO: 0 % (ref 0–1)
BILIRUB SERPL-MCNC: 0.58 MG/DL (ref 0.2–1)
BLD GP AB SCN SERPL QL: NEGATIVE
BUN SERPL-MCNC: 47 MG/DL (ref 5–25)
BUN SERPL-MCNC: 50 MG/DL (ref 5–25)
CALCIUM SERPL-MCNC: 8.1 MG/DL (ref 8.3–10.1)
CALCIUM SERPL-MCNC: 8.3 MG/DL (ref 8.3–10.1)
CHLORIDE SERPL-SCNC: 110 MMOL/L (ref 100–108)
CHLORIDE SERPL-SCNC: 112 MMOL/L (ref 100–108)
CO2 SERPL-SCNC: 26 MMOL/L (ref 21–32)
CO2 SERPL-SCNC: 30 MMOL/L (ref 21–32)
CREAT SERPL-MCNC: 2.08 MG/DL (ref 0.6–1.3)
CREAT SERPL-MCNC: 2.43 MG/DL (ref 0.6–1.3)
EOSINOPHIL # BLD AUTO: 0 THOUSAND/ΜL (ref 0–0.61)
EOSINOPHIL NFR BLD AUTO: 0 % (ref 0–6)
ERYTHROCYTE [DISTWIDTH] IN BLOOD BY AUTOMATED COUNT: 14.9 % (ref 11.6–15.1)
ERYTHROCYTE [SEDIMENTATION RATE] IN BLOOD: 92 MM/HOUR (ref 0–20)
GFR SERPL CREATININE-BSD FRML MDRD: 17 ML/MIN/1.73SQ M
GFR SERPL CREATININE-BSD FRML MDRD: 21 ML/MIN/1.73SQ M
GLUCOSE SERPL-MCNC: 139 MG/DL (ref 65–140)
GLUCOSE SERPL-MCNC: 162 MG/DL (ref 65–140)
HCT VFR BLD AUTO: 20.2 % (ref 34.8–46.1)
HCT VFR BLD AUTO: 21.7 % (ref 34.8–46.1)
HGB BLD-MCNC: 6.8 G/DL (ref 11.5–15.4)
HGB BLD-MCNC: 7 G/DL (ref 11.5–15.4)
LYMPHOCYTES # BLD AUTO: 0.78 THOUSANDS/ΜL (ref 0.6–4.47)
LYMPHOCYTES NFR BLD AUTO: 6 % (ref 14–44)
MCH RBC QN AUTO: 31.2 PG (ref 26.8–34.3)
MCHC RBC AUTO-ENTMCNC: 33.7 G/DL (ref 31.4–37.4)
MCV RBC AUTO: 93 FL (ref 82–98)
MONOCYTES # BLD AUTO: 1 THOUSAND/ΜL (ref 0.17–1.22)
MONOCYTES NFR BLD AUTO: 8 % (ref 4–12)
MRSA NOSE QL CULT: NORMAL
NEUTROPHILS # BLD AUTO: 10.42 THOUSANDS/ΜL (ref 1.85–7.62)
NEUTS SEG NFR BLD AUTO: 85 % (ref 43–75)
NRBC BLD AUTO-RTO: 0 /100 WBCS
PLATELET # BLD AUTO: 205 THOUSANDS/UL (ref 149–390)
PMV BLD AUTO: 10.3 FL (ref 8.9–12.7)
POTASSIUM SERPL-SCNC: 4.2 MMOL/L (ref 3.5–5.3)
POTASSIUM SERPL-SCNC: 4.3 MMOL/L (ref 3.5–5.3)
PROT SERPL-MCNC: 6.4 G/DL (ref 6.4–8.2)
PTH-INTACT SERPL-MCNC: 175.8 PG/ML (ref 18.4–80.1)
RBC # BLD AUTO: 2.18 MILLION/UL (ref 3.81–5.12)
RH BLD: POSITIVE
SODIUM SERPL-SCNC: 145 MMOL/L (ref 136–145)
SODIUM SERPL-SCNC: 146 MMOL/L (ref 136–145)
SPECIMEN EXPIRATION DATE: NORMAL
TSH SERPL DL<=0.05 MIU/L-ACNC: 1.28 UIU/ML (ref 0.36–3.74)
WBC # BLD AUTO: 12.25 THOUSAND/UL (ref 4.31–10.16)

## 2018-05-29 PROCEDURE — 97163 PT EVAL HIGH COMPLEX 45 MIN: CPT

## 2018-05-29 PROCEDURE — 85018 HEMOGLOBIN: CPT | Performed by: INTERNAL MEDICINE

## 2018-05-29 PROCEDURE — G8979 MOBILITY GOAL STATUS: HCPCS

## 2018-05-29 PROCEDURE — G8987 SELF CARE CURRENT STATUS: HCPCS

## 2018-05-29 PROCEDURE — 86901 BLOOD TYPING SEROLOGIC RH(D): CPT | Performed by: INTERNAL MEDICINE

## 2018-05-29 PROCEDURE — 85652 RBC SED RATE AUTOMATED: CPT | Performed by: STUDENT IN AN ORGANIZED HEALTH CARE EDUCATION/TRAINING PROGRAM

## 2018-05-29 PROCEDURE — 84443 ASSAY THYROID STIM HORMONE: CPT | Performed by: STUDENT IN AN ORGANIZED HEALTH CARE EDUCATION/TRAINING PROGRAM

## 2018-05-29 PROCEDURE — 99024 POSTOP FOLLOW-UP VISIT: CPT | Performed by: PHYSICIAN ASSISTANT

## 2018-05-29 PROCEDURE — 86923 COMPATIBILITY TEST ELECTRIC: CPT

## 2018-05-29 PROCEDURE — P9021 RED BLOOD CELLS UNIT: HCPCS

## 2018-05-29 PROCEDURE — 99232 SBSQ HOSP IP/OBS MODERATE 35: CPT | Performed by: INTERNAL MEDICINE

## 2018-05-29 PROCEDURE — 83970 ASSAY OF PARATHORMONE: CPT | Performed by: STUDENT IN AN ORGANIZED HEALTH CARE EDUCATION/TRAINING PROGRAM

## 2018-05-29 PROCEDURE — 97167 OT EVAL HIGH COMPLEX 60 MIN: CPT

## 2018-05-29 PROCEDURE — 80053 COMPREHEN METABOLIC PANEL: CPT | Performed by: STUDENT IN AN ORGANIZED HEALTH CARE EDUCATION/TRAINING PROGRAM

## 2018-05-29 PROCEDURE — G8978 MOBILITY CURRENT STATUS: HCPCS

## 2018-05-29 PROCEDURE — G8988 SELF CARE GOAL STATUS: HCPCS

## 2018-05-29 PROCEDURE — 82306 VITAMIN D 25 HYDROXY: CPT | Performed by: STUDENT IN AN ORGANIZED HEALTH CARE EDUCATION/TRAINING PROGRAM

## 2018-05-29 PROCEDURE — 85014 HEMATOCRIT: CPT | Performed by: INTERNAL MEDICINE

## 2018-05-29 PROCEDURE — 30233N1 TRANSFUSION OF NONAUTOLOGOUS RED BLOOD CELLS INTO PERIPHERAL VEIN, PERCUTANEOUS APPROACH: ICD-10-PCS | Performed by: INTERNAL MEDICINE

## 2018-05-29 PROCEDURE — 80048 BASIC METABOLIC PNL TOTAL CA: CPT | Performed by: ORTHOPAEDIC SURGERY

## 2018-05-29 PROCEDURE — 99231 SBSQ HOSP IP/OBS SF/LOW 25: CPT | Performed by: INTERNAL MEDICINE

## 2018-05-29 PROCEDURE — 86900 BLOOD TYPING SEROLOGIC ABO: CPT | Performed by: INTERNAL MEDICINE

## 2018-05-29 PROCEDURE — 92610 EVALUATE SWALLOWING FUNCTION: CPT

## 2018-05-29 PROCEDURE — 85025 COMPLETE CBC W/AUTO DIFF WBC: CPT | Performed by: ORTHOPAEDIC SURGERY

## 2018-05-29 PROCEDURE — 94760 N-INVAS EAR/PLS OXIMETRY 1: CPT

## 2018-05-29 PROCEDURE — 86850 RBC ANTIBODY SCREEN: CPT | Performed by: INTERNAL MEDICINE

## 2018-05-29 RX ORDER — DEXTROSE MONOHYDRATE 50 MG/ML
100 INJECTION, SOLUTION INTRAVENOUS ONCE
Status: COMPLETED | OUTPATIENT
Start: 2018-05-29 | End: 2018-05-30

## 2018-05-29 RX ORDER — PANTOPRAZOLE SODIUM 40 MG/1
40 TABLET, DELAYED RELEASE ORAL
Status: DISCONTINUED | OUTPATIENT
Start: 2018-05-30 | End: 2018-06-01 | Stop reason: HOSPADM

## 2018-05-29 RX ORDER — ACETAMINOPHEN 325 MG/1
650 TABLET ORAL ONCE
Status: COMPLETED | OUTPATIENT
Start: 2018-05-29 | End: 2018-05-29

## 2018-05-29 RX ADMIN — OXYCODONE HYDROCHLORIDE 5 MG: 5 SOLUTION ORAL at 20:32

## 2018-05-29 RX ADMIN — ENOXAPARIN SODIUM 30 MG: 30 INJECTION SUBCUTANEOUS at 18:31

## 2018-05-29 RX ADMIN — DEXTROSE 100 ML/HR: 5 SOLUTION INTRAVENOUS at 22:37

## 2018-05-29 RX ADMIN — OXYCODONE HYDROCHLORIDE 5 MG: 5 SOLUTION ORAL at 07:04

## 2018-05-29 RX ADMIN — ACETAMINOPHEN 975 MG: 325 TABLET ORAL at 13:59

## 2018-05-29 RX ADMIN — MELATONIN TAB 3 MG 6 MG: 3 TAB at 20:31

## 2018-05-29 RX ADMIN — ACETAMINOPHEN 650 MG: 325 TABLET, FILM COATED ORAL at 18:23

## 2018-05-29 RX ADMIN — CEFAZOLIN SODIUM 1000 MG: 1 SOLUTION INTRAVENOUS at 02:31

## 2018-05-29 RX ADMIN — ACETAMINOPHEN 975 MG: 325 TABLET ORAL at 05:20

## 2018-05-29 RX ADMIN — ALLOPURINOL 100 MG: 100 TABLET ORAL at 09:48

## 2018-05-29 RX ADMIN — PREDNISONE 30 MG: 20 TABLET ORAL at 09:47

## 2018-05-29 RX ADMIN — NYSTATIN 1 APPLICATION: 100000 POWDER TOPICAL at 18:34

## 2018-05-29 RX ADMIN — SODIUM CHLORIDE, SODIUM LACTATE, POTASSIUM CHLORIDE, AND CALCIUM CHLORIDE 75 ML/HR: .6; .31; .03; .02 INJECTION, SOLUTION INTRAVENOUS at 02:31

## 2018-05-29 RX ADMIN — NYSTATIN 1 APPLICATION: 100000 POWDER TOPICAL at 09:49

## 2018-05-29 RX ADMIN — VITAMIN D, TAB 1000IU (100/BT) 2000 UNITS: 25 TAB at 09:47

## 2018-05-29 NOTE — PROGRESS NOTES
[facility] Progress Note  Patient: Audrey Lowe 80 y o  female   MRN: [id number]  PCP: No primary care provider on file  Unit/Bed#: PPHP 930-01 Encounter: [id number]  Date Of Visit: 05/29/18    Assessment:    Principal Problem:    Displaced intertrochanteric fracture of left femur, initial encounter for closed fracture [facility])  Active Problems:    Subdural hemorrhage    Alzheimer disease    CHF (congestive heart failure)    HTN (hypertension)    Atrial fibrillation    Stage 4 chronic kidney disease (HCC)    SAH (subarachnoid hemorrhage)    Hypernatremia      Plan:    · Displaced Intertrochanteric Fracture Left Femur -   · Procedure - Cephalomedullary Nail Left Proximal Femur done by Dr Madelin Ahumada on 5/28/2018  · Mobility - WBAT to LLE  · Pain Control -   · Tylenol 975 mg q8h  · Oxycodone 5 mg every 6 h PRN for severe pain  · Monitor Pain Levels  · Bowel Regimen - senna syrup and miralax scheduled daily  Monitor BMs  · VTE prophylaxis - Lovenox  · Acute Blood Loss Anemia - significant drop noted today in H/H  Will recheck to confirm and also check type and screen  If repeat H/H confirms very low hemoglobin, then would transfuse 1 unit PRBC today  · Oropharyngeal Dysphagia - seen by speech pathology  Recommendation is pureed diet with HTL  Determine home diet  · Chronic Heart Failure -   · Diuretic - normally on lasix 60 mg bid  Will hold further fluids at this point  Will consider restarting lasix possibly in next 24 hours  · Beta Blocker - Atenolol  · ACEI / ARB - None due to renal dysfunction  · Monitor I/O and Daily Weights  · Hypernatremia - monitor with IV fluids being stopped  Encourage Po fluids  If no improvement, or any worsening, may try some D5W  Monitor I/O  BMP in am   · Alzheimer Dementia - supportive care  · Atrial Fibrillation - Rate Controlled with atenolol    Not on anticoagulation as she has history of intracranial hemorrhage in past   · CKD IV - Baseline Cr appears to be anywhere from 2 - 2 5  Monitor using labs and monitoring of I/O           VTE Pharmacologic Prophylaxis:   Pharmacologic: Enoxaparin (Lovenox)  Mechanical VTE Prophylaxis in Place: Yes    Patient Centered Rounds: I have performed bedside rounds with nursing staff today  Discussions with Specialists or Other Care Team Provider: None    Education and Discussions with Family / Patient: attempted to update godfrey Woodall or son Stefanie Junior at  but it went to voicemail  Message left with callback number  Time Spent for Care: 30 minutes  More than 50% of total time spent on counseling and coordination of care as described above  Current Length of Stay: 2 day(s)    Current Patient Status: Inpatient   Certification Statement: The patient will continue to require additional inpatient hospital stay due to evaluation and treatment of above medical conditions  Discharge Plan / Estimated Discharge Date: next 48 - 72 hours? Post op evaluations, treatment of anemia, and monitoring for PO intake with modified diet all needed prior to discharge  Code Status: Level 1 - Full Code      Subjective: The patient has no acute complaints aside from some pain in the left hip  Feels the pain gets a little better with pain medication  Some "queezy stomach" also described  Objective:     Vitals:   Temp (24hrs), Av 2 °F (36 8 °C), Min:97 9 °F (36 6 °C), Max:98 5 °F (36 9 °C)    HR:  [] 100  Resp:  [18] 18  BP: ()/(50-90) 94/50  SpO2:  [92 %-99 %] 96 %  Body mass index is 36 73 kg/m²  Input and Output Summary (last 24 hours): Intake/Output Summary (Last 24 hours) at 18 1710  Last data filed at 18 2842   Gross per 24 hour   Intake           148 75 ml   Output              200 ml   Net           -51 25 ml       Physical Exam:     Physical Exam   HENT:   Mouth/Throat: Oropharynx is clear and moist  No oropharyngeal exudate     Eyes: Pupils are equal, round, and reactive to light  Cardiovascular: Normal rate and regular rhythm  No murmur heard  Pulmonary/Chest: Effort normal and breath sounds normal  She has no wheezes  She has no rales  Abdominal: Soft  Bowel sounds are normal  She exhibits no distension  There is no tenderness  Musculoskeletal: She exhibits tenderness (left hip location  )  She exhibits no edema  Bandage over the left hip with some darkened blood on the bandage itself  No rash or ecchymosis of the left hip itself  Neurological: She is alert  No cranial nerve deficit  She exhibits normal muscle tone  Speech is stuttered at times and slightly dysarthric  She is oriented to self and knows she is at a hospital but can not tell me which one  She can not tell me the month or year  Skin: No rash noted  Psychiatric: She has a normal mood and affect  Vitals reviewed  Additional Data:     Labs:      Results from last 7 days  Lab Units 05/29/18  1136 05/29/18  0459   WBC Thousand/uL  --  12 25*   HEMOGLOBIN g/dL 7 0* 6 8*   HEMATOCRIT % 21 7* 20 2*   PLATELETS Thousands/uL  --  205   NEUTROS PCT %  --  85*   LYMPHS PCT %  --  6*   MONOS PCT %  --  8   EOS PCT %  --  0       Results from last 7 days  Lab Units 05/29/18  1353   SODIUM mmol/L 145   POTASSIUM mmol/L 4 3   CHLORIDE mmol/L 112*   CO2 mmol/L 26   BUN mg/dL 50*   CREATININE mg/dL 2 43*   CALCIUM mg/dL 8 3   TOTAL PROTEIN g/dL 6 4   BILIRUBIN TOTAL mg/dL 0 58   ALK PHOS U/L 79   ALT U/L 14   AST U/L 16   GLUCOSE RANDOM mg/dL 162*       Results from last 7 days  Lab Units 05/27/18  0730   INR  1 06       * I Have Reviewed All Lab Data Listed Above  * Additional Pertinent Lab Tests Reviewed: [facility] Admission Reviewed    Imaging:    Imaging Reports Reviewed Today Include: multiple hip / pelvis xrays, CT left hip, Xray left knee, Chest Xray    Imaging Personally Reviewed by Myself Includes:  None    Recent Cultures (last 7 days):           Last 24 Hours Medication List:     Current Facility-Administered Medications:  acetaminophen 975 mg Oral Q8H [address] Phill Salas MD    albuterol 2 5 mg Nebulization Q6H PRN Phill Salas MD    allopurinol 100 mg Oral Daily Phill Salas MD    aluminum-magnesium hydroxide-simethicone 30 mL Oral Q6H PRN Phill Salsa MD    atenolol 50 mg Oral Daily Phill Salas MD    budesonide-formoterol 2 puff Inhalation BID Phill Salas MD    cholecalciferol 2,000 Units Oral Daily Phill Salas MD    diphenoxylate-atropine 1 tablet Oral Q6H PRN Phill Salas MD    docusate sodium 100 mg Oral BID PRN Phill Salas MD    enoxaparin 30 mg Subcutaneous Q24H Catina Chang MD    guaiFENesin 200 mg Oral 4x Daily Phill Salas MD    lactated ringers 75 mL/hr Intravenous Continuous Hyacinth Steward MD Last Rate: 75 mL/hr (05/29/18 0231)   lactated ringers 75 mL/hr Intravenous Continuous Catina Chang MD    melatonin 6 mg Oral HS Phill Salas MD    nystatin 1 application Topical BID Phill Salas MD    ondansetron 4 mg Intravenous Q6H PRN Phill Salas MD    oxyCODONE 5 mg Oral Q6H PRN Phill Salas MD    polyethylene glycol 17 g Oral Daily Phill Salas MD    potassium chloride 10 mEq Oral Every Other Day Phill Salas MD    predniSONE 30 mg Oral Daily Isael Ni, MD    senna 8 8 mg Oral Daily Phill Salas MD    silver sulfadiazine 1 application Topical Daily PRN Phill Salas MD    sodium chloride 30 mL/hr Intravenous Continuous Phill Salas MD Last Rate: Stopped (05/27/18 3086)        Today, Patient Was Seen By: Bronson Armstrong DO    ** Please Note: This note has been constructed using a voice recognition system   **

## 2018-05-29 NOTE — SOCIAL WORK
Spoke to pts dtr Sue Jin [phone] in regards to dc planning  Pt is a resident of [address] [place] [address] in [address] [place] [address]  Pt ambulates with a roller walker assist of 1 and all other ADLs assist of 1  Pts dtr states she would like pt to go to [facility] or [facility] for rehab and referrals made  Explained to pts dtr I will leave a list of other facilities as well  Pt has no history of mental illness or substance abuse  Pt uses [facility] in [place]  CM will continue to follow for dc needs  CM reviewed d/c planning process including the following: identifying help at home, patient preference for d/c planning needs, Discharge Lounge, [facility] Meds to Bed program, availability of treatment team to discuss questions or concerns patient and/or family may have regarding understanding medications and recognizing signs and symptoms once discharged  CM also encouraged patient to follow up with all recommended appointments after discharge  Patient advised of importance for patient and family to participate in managing patients medical well being  Patient/caregiver received discharge checklist  Content reviewed  Patient/caregiver encouraged to participate in discharge plan of care prior to discharge home

## 2018-05-29 NOTE — PROGRESS NOTES
Progress Note - Orthopedics   Doneta Axon 80 y o  female MRN: [id number]  Unit/Bed#: PPHP 930-01      Subjective:    80 y  o female POD, #1 , status post IM nailing of the left femur, for intertrochanteric hip fracture  Awakens to verbal command however responds inappropriately    Patient correctly identified by her wrist band rather than by question    Labs:    0  Lab Value Date/Time   HCT 29 3 (L) 05/28/2018 0519   HCT 34 8 05/27/2018 0730   HCT 32 0 (L) 01/14/2018 0536   HGB 9 4 (L) 05/28/2018 0519   HGB 11 3 (L) 05/27/2018 0730   HGB 10 4 (L) 01/14/2018 0536   INR 1 06 05/27/2018 0730   WBC 10 03 05/28/2018 0519   WBC 13 19 (H) 05/27/2018 0730   WBC 6 84 01/14/2018 0536       Meds:    Current Facility-Administered Medications:     acetaminophen (TYLENOL) tablet 975 mg, 975 mg, Oral, Q8H [address], Lindsey Muir MD, 975 mg at 05/28/18 2123    albuterol inhalation solution 2 5 mg, 2 5 mg, Nebulization, Q6H PRN, Lindsey Muir MD    allopurinol (ZYLOPRIM) tablet 100 mg, 100 mg, Oral, Daily, Lindsey Muir MD    aluminum-magnesium hydroxide-simethicone (MYLANTA) 200-200-20 mg/5 mL oral suspension 30 mL, 30 mL, Oral, Q6H PRN, Lindsey Muir MD    atenolol (TENORMIN) tablet 50 mg, 50 mg, Oral, Daily, Lindsey Muir MD, 50 mg at 05/28/18 0849    budesonide-formoterol (SYMBICORT) 160-4 5 mcg/act inhaler 2 puff, 2 puff, Inhalation, BID, Lindsey Muir MD, 2 puff at 05/28/18 2123    cholecalciferol (VITAMIN D3) tablet 2,000 Units, 2,000 Units, Oral, Daily, Lindsey Muir MD    diphenoxylate-atropine (LOMOTIL) 2 5-0 025 mg per tablet 1 tablet, 1 tablet, Oral, Q6H PRN, Lindsey Muir MD    docusate sodium (COLACE) capsule 100 mg, 100 mg, Oral, BID PRN, Lindsey Muir MD    enoxaparin (LOVENOX) subcutaneous injection 30 mg, 30 mg, Subcutaneous, Q24H, Serge Kauffman MD, 30 mg at 05/28/18 1726    guaiFENesin (ROBITUSSIN) oral solution 200 mg, 200 mg, Oral, 4x Daily, Lindsey Muir, MD, 200 mg at 05/28/18 2122    lactated ringers infusion, 75 mL/hr, Intravenous, Continuous, Morris Puente MD, Last Rate: 75 mL/hr at 05/29/18 0231, 75 mL/hr at 05/29/18 0231    lactated ringers infusion, 75 mL/hr, Intravenous, Continuous, Julio Cesar Borden MD    melatonin tablet 6 mg, 6 mg, Oral, HS, Lawrence Gruber MD, 6 mg at 05/28/18 2123    nystatin (MYCOSTATIN) powder 1 application, 1 application, Topical, BID, Lawrence Gruber MD, 1 application at 64/62/51 1726    ondansetron (ZOFRAN) injection 4 mg, 4 mg, Intravenous, Q6H PRN, Lawrence Gruber MD    oxyCODONE (ROXICODONE) oral solution 5 mg, 5 mg, Oral, Q6H PRN, Lawrence Gruber MD, 5 mg at 05/28/18 0850    polyethylene glycol (MIRALAX) packet 17 g, 17 g, Oral, Daily, Lawrence Gruber MD    potassium chloride (K-DUR,KLOR-CON) CR tablet 10 mEq, 10 mEq, Oral, Every Other Day, Lawrence Gruber MD    predniSONE tablet 30 mg, 30 mg, Oral, Daily, Nadia Molina MD    senna 8 8 mg/5 mL oral syrup 8 8 mg, 8 8 mg, Oral, Daily, Lawrence Gruber MD    silver sulfadiazine (SILVADENE,SSD) 1 % cream 1 application, 1 application, Topical, Daily PRN, Lawrence Gruber MD    sodium chloride 0 9 % infusion, 30 mL/hr, Intravenous, Continuous, Lawrence Gruber MD, Stopped at 05/27/18 2346    Blood Culture:   No results found for: BLOODCX    Wound Culture:   Lab Results   Component Value Date    WOUNDCULT 2+ Growth of Staphylococcus aureus (A) 02/15/2018       Ins and Outs:  I/O last 24 hours: In: 1819 3 [I V :1569 3; IV Piggyback:250]  Out: 780 [Urine:730; Blood:50]          Physical:  Vitals:    05/29/18 0348   BP: 107/55   Pulse: 99   Resp: 18   Temp: 98 1 °F (36 7 °C)   SpO2: 97%     Musculoskeletal: left Lower Extremity  Patient has clean dry dressings applied to the left lateral hip  She has no significant hip pain or left lower extremity pain at rest  SCDs are present on both legs    Is negative Homans test left lower extremity     _*_*_*_*_*_*_*_*_*_*_*_*_*_*_*_*_*_*_*_*_*_*_*_*_*_*_*_*_*_*_*_*_*_*_*_*_*_*_*_*_*    Assessment:    80 y  o female , postop day 1 status post left femoral IM nailing    Plan:  · Weight-bearing as tolerated left lower extremity  · Observe for postop acute blood loss anemia  · PT/OT  · DVT ppx  · Dispo: Ortho will follow    Digna Oneil PA-C

## 2018-05-29 NOTE — OCCUPATIONAL THERAPY NOTE
[address] Evaluation     Patient Name: Rivera GRIFFINU Date: 5/29/2018  Problem List  Patient Active Problem List   Diagnosis    Subdural hemorrhage    Alzheimer disease    CHF (congestive heart failure)    HTN (hypertension)    Atrial fibrillation    Stage 4 chronic kidney disease ([facility] )    Coagulopathy    Intraparenchymal hematoma of brain due to trauma   [facility] (subarachnoid hemorrhage)    Fall from bed    Open fracture of hip ([facility] )    Displaced intertrochanteric fracture of left femur, initial encounter for closed fracture ([facility] )    Hypernatremia     Past Medical History  Past Medical History:   Diagnosis Date    A-fib ([facility] )     Alzheimer's dementia     CHF (congestive heart failure) ([facility] )     Gout     Hypertension     Renal disorder      Past Surgical History  Past Surgical History:   Procedure Laterality Date    [place] OPEN RX FEMUR FX+INTRAMED DUSTIN Left 5/28/2018    Procedure: INSERTION NAIL IM FEMUR ANTEGRADE (TROCHANTERIC); Surgeon: Michael Ordaz MD;  [facility];  Service: Orthopedics         05/29/18 1415   Note Type   Note type Eval/Treat   Restrictions/Precautions   Weight Bearing Precautions Per Order Yes   LLE Weight Bearing Per Order WBAT   Other Precautions Cognitive; Chair Alarm; Bed Alarm;Multiple lines; Fall Risk;Pain;Hard of hearing   Pain Assessment   Pain Assessment 0-10   Pain Score 8   Pain Type Acute pain;Surgical pain   Pain Location Leg;Hip   Pain Orientation Left   Patient's Stated Pain Goal No pain   Hospital Pain Intervention(s) Repositioned; Ambulation/increased activity; Distraction; Emotional support   Response to Interventions INCREASED WITH ACTIVITY    Home Living   Type of Home Assisted living  ([facility] )   Home Layout One level   Bathroom Shower/Tub Walk-in shower   Bathroom Toilet Raised   Bathroom Accessibility Accessible   Home Equipment Walker   Prior Function   Level of [place] Needs assistance with ADLs and functional mobility; Needs assistance with IADLs   Lives With Facility staff   Receives Help From Personal care attendant   ADL Assistance Needs assistance   IADLs Needs assistance   Falls in the last 6 months 1 to 4   Vocational Retired   Lifestyle   [employer]  [address] ALZHEIMERS  PER PT'S CHART, PT IS FROM [facility] WHERE SHE REQUIRES ASSIST FOR ADLS AND USES RW FOR MOBILITY  PT REPORTS SHE IS ABLE TO TOILET/DRESS HERSELF (?)   Reciprocal Relationships FROM [facility]  RN REPORTS SUPPORTIVE FAMILY MEMBERS PRESENT EARILER TODAY  Service to Others RETIRED   Intrinsic Gratification PT REPORTS SHE ENJOYS WATCH TV/LISTENING TO MUSIC  ADL   Eating Assistance 4  Minimal Assistance   Grooming Assistance 3  Moderate Assistance   UB Bathing Assistance 2  Maximal Assistance   LB Bathing Assistance 2  Maximal Assistance   UB Dressing Assistance 2  Maximal Assistance   LB Dressing Assistance 2  Maximal [address]  2  Maximal Assistance   Functional Assistance 2  Maximal Assistance   Bed Mobility   Supine to Sit 2  Maximal assistance   Additional items Assist x 2; Increased time required;Verbal cues;LE management   Sit to Supine Unable to assess  (PT LEFT OOB WITH ALARM ON + ALL NEEDS IN REACH )   Transfers   Sit to Stand 2  Maximal assistance   Additional items Assist x 1; Increased time required;Verbal cues  (ASSIST OF 2ND PERSON FOR SAFETY T/O)   Stand to Sit 2  Maximal assistance   Additional items Assist x 1; Increased time required;Verbal cues  (ASSIST OF 2ND PERSON FOR SAFETY T/O)   Stand pivot 2  Maximal assistance   Additional items Assist x 1; Increased time required;Verbal cues  (ASSIST OF 2ND PERSON FOR SAFETY T/O)   Additional Comments RECOMMEND SMOOTH  FOR RETURN TO BED WITH NSG STAFF   Balance   Static Sitting Poor +   Static Standing Poor -   Activity Tolerance   Activity Tolerance Patient limited by fatigue;Patient limited by pain; Other (Comment)  (COG)   Medical Staff Made Aware SPOKE TO CM REGARDING    Nurse Made Aware APPROPRIATE TO SEE   RUE Assessment   RUE Assessment WFL   RUE Strength   RUE Overall Strength Within Functional Limits - able to perform ADL tasks with strength   LUE Assessment   LUE Assessment WFL   LUE Strength   LUE Overall Strength Within Functional Limits - able to perform ADL tasks with strength   Hand Function   Gross Motor Coordination Functional   Fine Motor Coordination Functional   Cognition   Overall Cognitive Status Impaired   Arousal/Participation Responsive;Persistent stimuli required   Attention Difficulty attending to directions   Orientation Level Oriented to person;Disoriented to place; Disoriented to time;Disoriented to situation   Memory Decreased recall of precautions;Decreased recall of recent events;Decreased short term memory;Decreased recall of biographical information   Following Commands Follows one step commands inconsistently   Comments PT IS AGREEABLE TO PARTICIPATE IN THERAPY  PT IS A POOR HISTORIAN WITH BASELINE ALZHEIMER'S  PT ORIENTED TO PERSON ONLY  CONTINUAL CUES REQUIRED TO ATTEND TO TASK  PT IS SLOW TO PROCESS/RESPOND  RECOMMEND ALARM ON PT AT ALL TIMES FOR SAFETY  RECOMMEND LIGHTS ON/BLINDS OPEN DURING DAY-TIME HOURS TO PROMOTE PROPER SLEEP-WAKE CYCLE  Assessment   Limitation Decreased ADL status; Decreased Safe judgement during ADL;Decreased cognition;Decreased endurance;Decreased self-care trans;Decreased high-level ADLs;Mood limitation   Prognosis Fair   Assessment 87 YO FEMALE SEEN FOR INITIAL OT EVAL S/P FALL RESULTING IN DISPLACED INTRERTROCHANTERIC FSX OF L FEMUR  PT IS S/P IM NAILING AND IS WBAT ON LLE  PROBLEMS LIST INCLUDES SAH, SKI, STRIAL FIBRILLATION, HTN, CHF, SDH AND ALZHEIMERS  PT IS A POOR HISTORIAN, INFORMATION OBTAINED FROM PT'S CHART  PT IS FROM [facility] WHERE SHE REQUIRES ASSIST FOR ADLS AND USES RW FOR MOBILITY   PT CURRENTLY REQUIRES OVERALL MAX A FOR ADLS, MAX A X2 FOR BED MOBILITY AND MAX A X1 FOR SIT<->STAND/SPT WITH ASSIST OF 2ND PERSON FOR SAFETY  PT IS LIMITED 2' PAIN, FATIGUE, IMPAIRED SITTING BALANCE, DISORIENTATION, LIMITED SAFETY AWARENESS/INSIGHT/JUDGEMENT, SLOW TO PROCESS/RESPOND, OVERALL WEAKNESS AND LIMITED ACTIVITY TOLERANCE  FROM AN OT PERSPECTIVE, PT WOULD BENEFIT FROM CONT OT SERVICES IN AN INPT REHAB SETTING UPON D/C  WILL CONT TO FOLLOW  Goals   Patient Goals TO RETURN TO BED  LTG Time Frame 10-14   Long Term Goal #1 SEE BELOW    Plan   Treatment Interventions ADL retraining;Functional transfer training; Endurance training;Cognitive reorientation;Patient/family training;Equipment evaluation/education; Compensatory technique education; Energy conservation; Activityengagement   Goal Expiration Date 06/12/18   OT Frequency 3-5x/wk   Recommendation   OT Discharge Recommendation Short Term Rehab   OT - OK to Discharge Yes  (TO INPT REHAB WHEN MEDICALLY CLEARED  )   Barthel Index   Feeding 5   Bathing 0   Grooming Score 0   Dressing Score 0   Bladder Score 5   Bowels Score 5   Toilet Use Score 5   Transfers (Bed/Chair) Score 5   Mobility (Level Surface) Score 0   Stairs Score 0   Barthel Index Score 25   Modified Kiowa Scale   Modified Kiowa Scale 4       OCCUPATIONAL THERAPY GOALS TO BE MET WITHIN 10-14 DAYS:    -Pt will increase bed mobility to MIN A to participate in functional activities with G tolerance and balance  -Pt will tolerate sitting EOB for ~20 minutes in order to increase participation in self-care/leisure activities     -Pt will improve functional mobility and transfers to MIN A on/off all surfaces w/ G balance/safety including toileting   -Pt will increase independence in all ADLS to MIN A with G balance sitting upright in chair   -Pt will improve activity tolerance to G for 30 min txment sessions w/ G carry over of learned energy conservation techniques   -Pt will demonstrate G carryover of learned safety techniques and proper body mechanics in functional and leisure activities with use of DME   -Pt will complete additional cognitive assessment with 100% attention to task in order to assist with safe d/c plan  -Pt will follow 100% simple 2-step commands to increase participation in functional activities  -Pt will increase attention to task for ~15 minutes in order to participate in therapy      Documentation completed by DISHA Hernandez, OTR/L

## 2018-05-29 NOTE — PLAN OF CARE
Problem: OCCUPATIONAL THERAPY ADULT  Goal: Performs self-care activities at highest level of function for planned discharge setting  See evaluation for individualized goals  Treatment Interventions: ADL retraining, Functional transfer training, Endurance training, Cognitive reorientation, Patient/family training, Equipment evaluation/education, Compensatory technique education, Energy conservation, Activityengagement          See flowsheet documentation for full assessment, interventions and recommendations  Limitation: Decreased ADL status, Decreased Safe judgement during ADL, Decreased cognition, Decreased endurance, Decreased self-care trans, Decreased high-level ADLs, Mood limitation  Prognosis: Fair  Assessment: 85 YO FEMALE SEEN FOR INITIAL OT EVAL S/P FALL RESULTING IN DISPLACED INTRERTROCHANTERIC FSX OF L FEMUR  PT IS S/P IM NAILING AND IS WBAT ON LLE  PROBLEMS LIST INCLUDES SAH, SKI, STRIAL FIBRILLATION, HTN, CHF, SDH AND ALZHEIMERS  PT IS A POOR HISTORIAN, INFORMATION OBTAINED FROM PT'S CHART  PT IS FROM [facility] WHERE SHE REQUIRES ASSIST FOR ADLS AND USES RW FOR MOBILITY  PT CURRENTLY REQUIRES OVERALL MAX A FOR ADLS, MAX A X2 FOR BED MOBILITY AND MAX A X1 FOR SIT<->STAND/SPT WITH ASSIST OF 2ND PERSON FOR SAFETY  PT IS LIMITED 2' PAIN, FATIGUE, IMPAIRED SITTING BALANCE, DISORIENTATION, LIMITED SAFETY AWARENESS/INSIGHT/JUDGEMENT, SLOW TO PROCESS/RESPOND, OVERALL WEAKNESS AND LIMITED ACTIVITY TOLERANCE  FROM AN OT PERSPECTIVE, PT WOULD BENEFIT FROM CONT OT SERVICES IN AN INPT REHAB SETTING UPON D/C  WILL CONT TO FOLLOW        OT Discharge Recommendation: Short Term Rehab  OT - OK to Discharge: Yes (TO INPT [address]  )

## 2018-05-29 NOTE — PLAN OF CARE
Problem: PHYSICAL THERAPY ADULT  Goal: Performs mobility at highest level of function for planned discharge setting  See evaluation for individualized goals  Treatment/Interventions: Functional transfer training, LE strengthening/ROM, Therapeutic exercise, Endurance training, Cognitive reorientation, Patient/family training, Equipment eval/education, Bed mobility, Compensatory technique education, Spoke to nursing, Spoke to case management, OT  Equipment Recommended: Kati Spanner, Wheelchair       See flowsheet documentation for full assessment, interventions and recommendations  Prognosis: Fair  Problem List: Decreased strength, Impaired balance, Decreased range of motion, Decreased endurance, Decreased mobility, Decreased coordination, Decreased cognition, Impaired judgement, Decreased safety awareness, Impaired vision, Impaired hearing, Obesity, Decreased skin integrity, Orthopedic restrictions, Pain  Assessment: Pt is 80 y o  female seen for PT evaluation s/p admit to [facility] on 5/27/2018  Pt s/p fall at [facility] w/ resulting L IT fx s/p IM nail 5/28/18  WBAT LLE ordered  PT now consulted for assessment of mobility and d/c needs  PMhx and  personal factors currently affecting pt's physical performance include: hx of alzheimer's dementia; SAH and as above  Prior to admission, pt was living at [facility] and was ambulatory w/ RW ; has A w/ ADL's from facility staff   Upon evaluation, pt currently is requiring maxAx2 flat spin w/ bed pad for supine to sit; Max A x1 w/ SBA and A for lines from another- pt unable to takes steps for functional ambulation   Pt confused and requiring constant redirection throughout eval-   Pt presents functioning below baseline and currently w/ overall mobility deficits 2* to: confusion; pain; edema;  decreased LE strength/AROM; limited flexibility;  generalized weakness/ deconditioning; decreased endurance; decreased activity tolerance; decreased coordination; impaired balance;decreased safety awareness; SOB/ALCARAZ; fatigue; impaired safety and judgement; limited insight into current deficits; bed/ chair alarms; multiple lines; hearing impairment/ visual impairments; ortho restrictions; lethargy  Pt currently at risk for falls  (Please find additional objective findings from PT assessment regarding body systems outlined above ) Pt will continue to benefit from skilled PT interventions to address stated impairments; to maximize functional potential; for ongoing pt/ family training; and DME needs  PT is currently recommending d/c to inpatient rehab setting when medically cleared for safety and to maximize functional potential prior to d/c home  Thao Hinojosa Pt/ family agreeable to plan and goals as stated on evaluation  Barriers to Discharge Comments: questional level of assist prior at [facility]? Recommendation:  (3-5x/week as tolerated )     PT - OK to Discharge:  (to rehab when medically cleared)    See flowsheet documentation for full assessment

## 2018-05-29 NOTE — PHYSICAL THERAPY NOTE
Physical Therapy Eval    Patient Name: Alexandr Chester    ETAQL'A Date: 5/29/2018     Problem List  Patient Active Problem List   Diagnosis    Subdural hemorrhage    Alzheimer disease    CHF (congestive heart failure)    HTN (hypertension)    Atrial fibrillation    [facility] )    Coagulopathy    Intraparenchymal hematoma of brain due to trauma   [facility] (subarachnoid hemorrhage)    Fall from bed    [facility] of [facility]    Displaced intertrochanteric fracture of left femur, initial encounter for closed fracture ([facility] )    Hypernatremia        Past Medical History  Past Medical History:   Diagnosis Date    [facility] )     Alzheimer's dementia     CHF (congest[facility] heart failure) ([facility] )     Gout     Hypertension     Renal disorder         Past Surgical History  Past Surgical History:   Procedure Laterality Date    LA OPEN RX FEMUR FX+INTRAMED DUSTIN Left 5/28/2018    Procedure: INSERTION NAIL IM FEMUR ANTEGRADE (TROCHANTERIC); Surgeon: Melany Lenz MD;  [facility]: [facility];  Service: Orthopedics         05/29/18 4917   Note Type   Note type Eval only   Pain Assessment   Pain Assessment 0-10   Pain Score 8   Pain Type Acute pain   Pain Location Leg   Pain Orientation Left   Effect of Pain on Daily Activities limited upright and mobiltiy    Hospital Pain Intervention(s) Repositioned; Ambulation/increased activity; Distraction;Elevated; Emotional support   Response to Interventions tolerated    Home Living   Type of Home Other (Comment)  ([facility])   Home Layout One level   Home Equipment Walker   Additional Comments pt is a poor historian- per notes resides in [facility] and is ambualtory w/ RW and has A mo staff w/ ADL's at baseline   pt has hx of alzheimer's dementia   Prior Function   Level of [place] Needs assistance with ADLs and functional mobility   Lives With Facility staff   ADL Assistance Needs assistance IADLs Needs assistance   Falls in the last 6 months 1 to 4   Comments see above    Restrictions/Precautions   Weight Bearing Precautions Per Order Yes   LLE Weight Bearing Per Order WBAT   Other Precautions Fall Risk;Pain;Multiple lines; Bed Alarm; Chair Alarm;Cognitive; Impulsive;Hard of hearing;WBS   General   Additional Pertinent History INSERTION NAIL IM FEMUR ANTEGRADE (TROCHANTERIC 5/28/18   Cognition   Overall Cognitive Status Impaired   Arousal/Participation Lethargic   Orientation Level Oriented to person;Disoriented to situation;Disoriented to time;Disoriented to place   Memory Decreased recall of precautions;Decreased recall of recent events;Decreased short term memory;Decreased recall of biographical information   Following Commands Follows one step commands inconsistently   RLE Assessment   RLE Assessment WFL  (3+/5 )   LLE Assessment   LLE Assessment X   Strength LLE   LLE Overall Strength 2+/5  (pain limiting )   Coordination   Movements are Fluid and Coordinated 0   Light Touch   RLE Light Touch Grossly intact   LLE Light Touch Grossly intact   Bed Mobility   Supine to Sit 2  Maximal assistance   Additional items LE management; Increased time required;HOB elevated  (flat spin w/ bed pad )   Transfers   Sit to Stand 2  Maximal assistance   Stand to Sit 2  Maximal assistance   Additional items Assist x 1   Stand pivot 2  Maximal assistance   Additional items Assist x 1  (via SPV w/ A of another for safety and lines )   Ambulation/Elevation   Gait pattern (unable - not approprite )   Balance   Static Sitting Poor +   Dynamic Standing Poor -   Ambulatory Zero   Endurance Deficit   Endurance Deficit Yes   Endurance Deficit Description fatigue; pain; weakness and LE instability   Activity Tolerance   Activity Tolerance Patient limited by pain; Patient limited by fatigue   Medical Staff Made Aware OT- Chise;    Nurse Made Aware RN    Assessment   Prognosis Fair   Problem List Decreased strength; Impaired balance;Decreased range of motion;Decreased endurance;Decreased mobility; Decreased coordination;Decreased cognition; Impaired judgement;Decreased safety awareness; Impaired vision; Impaired hearing;Obesity; Decreased skin integrity;Orthopedic restrictions;Pain   Assessment Pt is 80 y o  female seen for PT evaluation s/p admit to [facility] on 5/27/2018  Pt s/p fall at [facility] w/ resulting L IT fx s/p IM nail 5/28/18  WBAT LLE ordered  PT now consulted for assessment of mobility and d/c needs  PMhx and  personal factors currently affecting pt's physical performance include: hx of alzheimer's dementia; SAH and as above  Prior to admission, pt was living at [facility] and was ambulatory w/ RW ; has A w/ ADL's from facility staff   Upon evaluation, pt currently is requiring maxAx2 flat spin w/ bed pad for supine to sit; Max A x1 w/ SBA and A for lines from another- pt unable to takes steps for functional ambulation  Pt confused and requiring constant redirection throughout eval-   Pt presents functioning below baseline and currently w/ overall mobility deficits 2* to: confusion; pain; edema;  decreased LE strength/AROM; limited flexibility;  generalized weakness/ deconditioning; decreased endurance; decreased activity tolerance; decreased coordination; impaired balance;decreased safety awareness; SOB/ALCARAZ; fatigue; impaired safety and judgement; limited insight into current deficits; bed/ chair alarms; multiple lines; hearing impairment/ visual impairments; ortho restrictions; lethargy  Pt currently at risk for falls  (Please find additional objective findings from PT assessment regarding body systems outlined above ) Pt will continue to benefit from skilled PT interventions to address stated impairments; to maximize functional potential; for ongoing pt/ family training; and DME needs   PT is currently recommending d/c to inpatient rehab setting when medically cleared for safety and to maximize functional potential prior to d/c home  Cristiane Desouza Pt/ family agreeable to plan and goals as stated on evaluation  Barriers to Discharge Comments questional level of assist prior at [facility]? Goals   Patient Goals none expressed    STG Expiration Date 06/12/18   Short Term Goal #1 In 10 days pt will complete: 1) Bed mobility skills with minA2) Functional transfers with minAI 3) sit<>stands at bedside or from chair w/ Lolis and tolerate standing x 3 mins w/ Lolis' without LOB and stable vitals  4) pain <4/10 during PT sessions  5) Improve balance grades to Good 6) Improve LE strength grades by 1   7) LE HEP independently  8) PT for ongoing pt and family education; DME needs and D/C planning to promote highest level of function in least restrictive environment  PT to see for gait assessment once goals achieved and as appropriate   Treatment Day 0   Plan   Treatment/Interventions Functional transfer training;LE strengthening/ROM; Therapeutic exercise; Endurance training;Cognitive reorientation;Patient/family training;Equipment eval/education; Bed mobility; Compensatory technique education;Spoke to nursing;Spoke to case management;OT   Recommendation   Recommendation (3-5x/week as tolerated )   Equipment Recommended Patricia Snisabel; Wheelchair   PT - OK to Discharge (to rehab when medically cleared)   Modified Freestone Scale   Modified Jana Scale 4   Barthel Index   Feeding 0   Bathing 0   Grooming Score 0   Dressing Score 0   Bladder Score 5   Bowels Score 5   Toilet Use Score 5   Transfers (Bed/Chair) Score 5   Mobility (Level Surface) Score 0   Stairs Score 0   Barthel Index Score 20     Willis Hernandez, PT

## 2018-05-29 NOTE — PLAN OF CARE
Problem: SLP ADULT - SWALLOWING, IMPAIRED  Goal: Initial SLP swallow eval performed  Outcome: Completed Date Met: 05/29/18

## 2018-05-29 NOTE — PROGRESS NOTES
Progress Note - Cardiology   Ronan Bliss 80 y o  female MRN: [id number]  Unit/Bed#: [facility] 930-01 Encounter: [id number]    Assessment:  Principal Problem:    Displaced intertrochanteric fracture of left femur, initial encounter for closed fracture [facility])  Active Problems:    Subdural hemorrhage    Alzheimer disease    CHF (congestive heart failure)    HTN (hypertension)    Atrial fibrillation    Stage 3 chronic kidney disease    SAH (subarachnoid hemorrhage)    Hypernatremia    80-year-old female with persistent atrial fibrillation  Not on anticoagulation given history of bleeds  Dementia  s/p IM nailing of the left femur for hip fracture    Plan: Tolerated surgery from a cardiac standpoint  Continue current medication regimen  Will sign off, please call with questions or changes  Subjective/Objective     Subjective:  Family is present  She denies any complaints  Tolerated surgery well from a cardiac standpoint  Objective:    Vitals: /68 (BP Location: Left arm)   Pulse 104   Temp 98 °F (36 7 °C)   Resp 18   Wt 85 3 kg (188 lb 0 8 oz)   SpO2 93%   BMI 36 73 kg/m²   Vitals:    05/27/18 1953 05/28/18 0600   Weight: 86 9 kg (191 lb 9 3 oz) 85 3 kg (188 lb 0 8 oz)     Orthostatic Blood Pressures      Most Recent Value   Blood Pressure  100/68 filed at 05/29/2018 1010   Patient Position - Orthostatic VS  Lying filed at 05/29/2018 0348            Intake/Output Summary (Last 24 hours) at 05/29/18 1207  Last data filed at 05/29/18 4734   Gross per 24 hour   Intake           898 75 ml   Output              200 ml   Net           698 75 ml     Physical Exam:   General appearance: Awake, alert  Head: Normocephalic, without obvious abnormality, atraumatic  Neck: no carotid bruit, no JVD and supple, symmetrical, trachea midline  Lungs: clear to auscultation bilaterally  Normal air entry  Normal effort  Heart: S1, S2 irregular    Abdomen: soft, non-tender; bowel sounds normal; no masses,  no organomegaly  Extremities: 1+ LE edema      Medications:    Current Facility-Administered Medications:     acetaminophen (TYLENOL) tablet 975 mg, 975 mg, Oral, Q8H [address], Sarah Gregory MD, 975 mg at 05/29/18 0520    albuterol inhalation solution 2 5 mg, 2 5 mg, Nebulization, Q6H PRN, Sarah Gregory MD    allopurinol (ZYLOPRIM) tablet 100 mg, 100 mg, Oral, Daily, Sarah Gregory MD, 100 mg at 05/29/18 0948    aluminum-magnesium hydroxide-simethicone (MYLANTA) 200-200-20 mg/5 mL oral suspension 30 mL, 30 mL, Oral, Q6H PRN, Sarah Gregory MD    atenolol (TENORMIN) tablet 50 mg, 50 mg, Oral, Daily, Sarah Gregory MD, 50 mg at 05/28/18 0849    budesonide-formoterol (SYMBICORT) 160-4 5 mcg/act inhaler 2 puff, 2 puff, Inhalation, BID, Sarah Gregory MD, 2 puff at 05/28/18 2123    cholecalciferol (VITAMIN D3) tablet 2,000 Units, 2,000 Units, Oral, Daily, Sarah Gregory MD, 2,000 Units at 05/29/18 0947    diphenoxylate-atropine (LOMOTIL) 2 5-0 025 mg per tablet 1 tablet, 1 tablet, Oral, Q6H PRN, Sarah Gregory MD    docusate sodium (COLACE) capsule 100 mg, 100 mg, Oral, BID PRN, Sarah Gregory MD    enoxaparin (LOVENOX) subcutaneous injection 30 mg, 30 mg, Subcutaneous, Q24H, Patrica Smith MD, 30 mg at 05/28/18 1726    guaiFENesin (ROBITUSSIN) oral solution 200 mg, 200 mg, Oral, 4x Daily, Sarah Gregory MD, 200 mg at 05/28/18 2122    lactated ringers infusion, 75 mL/hr, Intravenous, Continuous, Kenyatta Bloom MD, Last Rate: 75 mL/hr at 05/29/18 0231, 75 mL/hr at 05/29/18 0231    lactated ringers infusion, 75 mL/hr, Intravenous, Continuous, Patrica Smith MD    melatonin tablet 6 mg, 6 mg, Oral, HS, Sarah Gregory MD, 6 mg at 05/28/18 2123    nystatin (MYCOSTATIN) powder 1 application, 1 application, Topical, BID, Sarah Gregory MD, 1 application at 10/60/77 0949    ondansetron (ZOFRAN) injection 4 mg, 4 mg, Intravenous, Q6H PRN, Sarah Gregory MD    oxyCODONE (ROXICODONE) oral solution 5 mg, 5 mg, Oral, Q6H PRN, Reji Castro MD, 5 mg at 05/29/18 0704    polyethylene glycol (MIRALAX) packet 17 g, 17 g, Oral, Daily, Reji Castro MD    potassium chloride (K-DUR,KLOR-CON) CR tablet 10 mEq, 10 mEq, Oral, Every Other Day, Reji Castro MD    predniSONE tablet 30 mg, 30 mg, Oral, Daily, Dotty Alonzo MD, 30 mg at 05/29/18 0947    senna 8 8 mg/5 mL oral syrup 8 8 mg, 8 8 mg, Oral, Daily, Reji Castro MD    silver sulfadiazine (SILVADENE,SSD) 1 % cream 1 application, 1 application, Topical, Daily PRN, Reji Castro MD    sodium chloride 0 9 % infusion, 30 mL/hr, Intravenous, Continuous, Reji Castro MD, Stopped at 05/27/18 2346    Lab Results:        Results from last 7 days  Lab Units 05/29/18  1136 05/29/18  0459 05/28/18  0519 05/27/18  1950 05/27/18  0730   WBC Thousand/uL  --  12 25* 10 03  --  13 19*   HEMOGLOBIN g/dL 7 0* 6 8* 9 4*  --  11 3*   HEMATOCRIT % 21 7* 20 2* 29 3*  --  34 8   PLATELETS Thousands/uL  --  205 240 264 330           Results from last 7 days  Lab Units 05/29/18  0459 05/28/18  0519 05/27/18  0730   SODIUM mmol/L 146* 147* 144   POTASSIUM mmol/L 4 2 3 8 3 9   CHLORIDE mmol/L 110* 112* 105   CO2 mmol/L 30 29 30   BUN mg/dL 47* 44* 54*   CREATININE mg/dL 2 08* 2 02* 2 48*   CALCIUM mg/dL 8 1* 8 2* 8 6   TOTAL PROTEIN g/dL  --  6 0* 7 7   BILIRUBIN TOTAL mg/dL  --  0 58 0 40   ALK PHOS U/L  --  87 121*   ALT U/L  --  20 29   AST U/L  --  13 20   GLUCOSE RANDOM mg/dL 139 92 108       Results from last 7 days  Lab Units 05/27/18  0730   INR  1 06   PTT seconds 24       Results from last 7 days  Lab Units 05/28/18  0519   MAGNESIUM mg/dL 2 3       Telemetry: Atrial fibrillation, rate controlled

## 2018-05-29 NOTE — SPEECH THERAPY NOTE
Speech Language/Pathology  Speech/Language Pathology  Assessment    Patient Name: Samantha Perdue  OPEZJ'X Date: 5/29/2018     Problem List  Patient Active Problem List   Diagnosis    Subdural hemorrhage    Alzheimer disease    CHF (congestive heart failure)    HTN (hypertension)    Atrial fibrillation    Stage 3 chronic kidney disease    Coagulopathy    Intraparenchymal hematoma of brain due to trauma   [facility] (subarachnoid hemorrhage)    Fall from bed    Open fracture of hip ([address] )    Displaced intertrochanteric fracture of left femur, initial encounter for closed fracture ([address] )    Hypernatremia     Past Medical History  Past Medical History:   Diagnosis Date    A-fib ([address] )     Alzheimer's dementia     CH[facility]congestive [facility] failure) [facility]     Gout     Hypertension     Renal disorder      Past Surgical History  History reviewed  No pertinent surgical history  5/27 H&P  Chief Complaint:   fall  History of Present Illness:  Samantha Perdue is a 80 y o  female who presents with fall with hip fracture  She was taken to [facility], complaining of left hip pain  Xray of hip and subsequent CT scan shows she has hip fracture  She was transferred to [place] for orthopedics surgery  She has multiple medical problems including a  Fib, dCHF, recent pneumonia/bronchitis, UTI, alzhiemer's dementia and ANABEL  She needs medical optimization before surgical intervention  5/29:  80 y  o female POD, #1 , status post IM nailing of the left femur, for intertrochanteric hip fracture  Awakens to verbal command however responds inappropriately  Patient correctly identified by her wrist band rather than by question    Summary:  Pt presents w/ need for persistent stim to participate in session s/p sx yesterday  Present w/ nurse w/ med administration  Meds need to be crushed  Pt needs cues to swallow, participate  Not appropriate for thin liquid meds at this time       Recommendations:  Diet: dysphagia 1 puree for now  Liquid: honey thick by tsp for now  Meds: crush  Supervision: full  Positioning:Upright  Strategies: Pt to take PO/Meds only when fully alert and upright  Feed only when fully alert  Cue to swallow as needed  Check mouth  Oral care  Aspiration precautions    Therapy Prognosis: favorable  Prognosis considerations: status prior to sx  Frequency:2-5x/week    Consider consult w/:  Nutrition    Reason for consult:  R/o aspiration  Determine safest and least restrictive diet  Lethargic post sx  Dinner tray held last night  Breakfast not given this am    Current diet:  Regular (held)  Premorbid diet[de-identified]  ? regular  Previous VBS:  None known  O2 requirement:  none  Voice/Speech:  Min responses were wnl  ? Sun'aq  Seems to respond better on left side  Social:  AL in [place]  Follows commands:  inconsistent                        Cognitive Status:  Persistent stim required  Oral mech exam:  Upper plate  Lower plate  Full symmetry at rest    Items administered:  meds crushed, min htl and puree  Oral stage:  wfl w/ min amt given but pt held bolus and needed max cues to transfer  trialed downward pressure of spoon on tongue  Pharyngeal stage:  No overt s/s but response time is slow  Laryngeal rise reduced, swallow delayed  Wet voice:no  Throat clear:no  Cough:no  Secondary swallows:no  Audible swallows:no  No s/s aspiration    Esophageal stage:  No s/s reported  Aspiration precautions posted    Results d/w:  Pt, nursing    Goal(s):  Pt will tolerate least restrictive diet w/out s/s aspiration or oral/pharyngeal difficulties

## 2018-05-29 NOTE — CASE MANAGEMENT
Initial Clinical Review    Admission: Date/Time/Statement: 5/27/18 @ 1712    Orders Placed This Encounter   Procedures    Inpatient Admission     Standing Status:   Standing     Number of Occurrences:   1     Order Specific Question:   Admitting Physician     Answer:   Dimple He [[id number]]     Order Specific Question:   Level of Care     Answer:   Med Surg [16]     Order Specific Question:   Estimated length of stay     Answer:   More than 2 Midnights     Order Specific Question:   Certification     Answer:   I certify that inpatient services are medically necessary for this patient for a duration of greater than two midnights  See H&P and MD Progress Notes for additional information about the patient's course of treatment  Date/Time/Mode of Arrival: 5/27 Transfer from [facility] ED    Chief Complaint:  fall    History of Illness:  80 y o  female who presents with fall with hip fracture  She was taken to minors, complaining of left hip pain  Xray of hip and subsequent CT scan shows she has hip fracture  She was transferred to [place] for orthopedics surgery  She has multiple medical problems including a  Fib, dCHF, recent pneumonia/bronchitis, UTI, alzhiemer's dementia and ANABEL  She needs medical optimization before surgical intervention    Vital Signs:   Vitals   Temperature Pulse Respirations Blood Pressure SpO2   05/27/18 1647 05/27/18 1647 05/27/18 1647 05/27/18 1647 05/27/18 1647   98 1 °F (36 7 °C) 79 20 134/97 93 %      Temp Source Heart Rate Source Patient Position - Orthostatic VS BP Location FiO2 (%)   05/27/18 1647 05/27/18 1647 05/27/18 1647 05/27/18 1647 --   Axillary Monitor Lying Left arm       Pain Score       05/27/18 1725       No Pain        Wt Readings from Last 1 Encounters:   05/28/18 85 3 kg (188 lb 0 8 oz)       Vital Signs (abnormal):  WNL    Abnormal Labs:    05/27/18 0730    WBC 4 31 - 10 16 Thousand/uL 13 19     RBC 3 81 - 5 12 Million/uL 3 73     Hemoglobin 11 5 - 15 4 g/dL 11 3       BUN 5 - 25 mg/dL 54     Creatinine 0 60 - 1 30 mg/dL 2 48        05/28/18 0519    Sodium 136 - 145 mmol/L 147     Potassium 3 5 - 5 3 mmol/L 3 8    Chloride 100 - 108 mmol/L 112     CO2 21 - 32 mmol/L 29    Anion Gap 4 - 13 mmol/L 6    BUN 5 - 25 mg/dL 44     Creatinine 0 60 - 1 30 mg/dL 2 02        05/28/18 0519    WBC 4 31 - 10 16 Thousand/uL 10 03    RBC 3 81 - 5 12 Million/uL 3 05     Hemoglobin 11 5 - 15 4 g/dL 9 4     Hematocrit 34 8 - 46 1 % 29 3         Diagnostic Test Results: CT Left Hip - Comminuted, intertrochanteric fracture of the proximal left femur        Past Medical/Surgical History: Active Ambulatory Problems     Diagnosis Date Noted    Subdural hemorrhage 01/12/2018    Alzheimer disease 01/12/2018    CHF (congestive heart failure) 01/12/2018    HTN (hypertension) 01/12/2018    Atrial fibrillation 01/12/2018    Stage 3 chronic kidney disease 01/12/2018    Coagulopathy 01/12/2018    Intraparenchymal hematoma of brain due to trauma 01/12/2018   [facility] (subarachnoid hemorrhage) 01/12/2018    Fall from bed 01/12/2018    Open fracture of hip ([facility] [address] ) 05/27/2018     Resolved Ambulatory Problems     Diagnosis Date Noted    Hypokalemia 01/13/2018     Past Medical History:   Diagnosis Date    A-fib [facility])     Alzheimer's dementia     CHF (congestive heart failure) ([facility] [address] )     Gout     Hypertension     Renal disorder        Admitting Diagnosis: Hip fracture ([address] ) [S72 009A]    Age/Sex: 80 y o  female    Assessment/Plan:   Open fracture of hip ([facility] [address] )   Assessment & Plan     [place] [facility] after mechanical fall witnessed by staff   She complains of left hip pain, was unable to stand after the fall due to the hip pain and has shortening and rotation of the left leg    Will obtain orthopedic consult          SAH (subarachnoid hemorrhage)   Assessment & Plan     Has history of SAH in the past    Continue to monitor          ANABEL (acute kidney injury)   Assessment & Plan   Patient has elevated creatinine   Will give gentle hydration          Atrial fibrillation   Assessment & Plan      Rate controlled, continue with atenolol 50 mg   Not on a/c due to falls  Restart after surgery             HTN (hypertension)   Assessment & Plan     Continue with home medications          CHF (congestive heart failure)   Assessment & Plan     Does not seem to be in excerbation  Monitor in and out fluids  Daily weights  Start on lasix  Cardiology consult          Alzheimer disease   Assessment & Plan     Patient has alzheimer's disease continue supportive care and monitor closely          Subdural hemorrhage   Assessment & Plan     She was admitted in January, she is not on any anticoagulation at this time  She was off a/c due to recurrent falls  Would need to go on lovenox for now              VTE Prophylaxis: Enoxaparin (Lovenox)  / sequential compression device   Code Status: full code     Anticipated Length of Stay:  Patient will be admitted on an Inpatient basis with an anticipated length of stay of  More than 2 midnights     Justification for Hospital Stay: due to hip fracture, need medical optimization before surgery       Admission Orders:  5/28 OR - S/P INSERTION NAIL IM FEMUR ANTEGRADE (TROCHANTERIC) (Left Leg Upper)  Tele monitoring  Neurovascular checks q4h  Cardiology cons  Orthopedic Surgery cons  PT/OT eval and treat    Scheduled Meds:   Current Facility-Administered Medications:  acetaminophen 975 mg Oral Q8H [facility]   allopurinol 100 mg Oral Daily   atenolol 50 mg Oral Daily   budesonide-formoterol 2 puff Inhalation BID   cholecalciferol 2,000 Units Oral Daily   enoxaparin 30 mg Subcutaneous Q24H   guaiFENesin 200 mg Oral 4x Daily   melatonin 6 mg Oral HS   nystatin 1 application Topical BID   polyethylene glycol 17 g Oral Daily   potassium chloride 10 mEq Oral Every Other Day   predniSONE 30 mg Oral Daily   senna 8 8 mg Oral Daily     Continuous Infusions:   lactated ringers 75 mL/hr PRN Meds: albuterol    aluminum-magnesium hydroxide-simethicone    diphenoxylate-atropine    docusate sodium    ondansetron    oxyCODONE po x1    silver sulfadiazine    ---------------------------------------------------------------------------------------------------    5/27 Orthopedic Surgery cons:  Assessment:  80 y  o female status post fall with leftIntertrochanteric femur fracture     Plan:   · Non weight bearing left lower extremity  · Admitted to medicine who recommends cardiology consult  · Will reach out to the cardiology team to notify them that she has on the operating room schedule early tomorrow morning  · Patient is unable to give informed consent  Family members were called twice without any answer    · Analgesics for pain  · NPO at midnight  · To OR for IM nail femur fracture of Intertrochanteric femur fracture when medically optimized  · Dispo: Ortho will follow

## 2018-05-29 NOTE — RESTORATIVE TECHNICIAN NOTE
Restorative Specialist Mobility Note       Activity: Other (Comment) (Returned patient BTB with smooth  board Ax3)  Assistive Device: Other (Comment) (Smooth  Board)  Repositioned: Supine  Anti-Embolism Device On: Bilateral, Sequential compression devices, below knee     Patient left resting comfortably in bed, with bed alarm activated and call bell within reach

## 2018-05-29 NOTE — PROGRESS NOTES
Ortho notified of low hemoglobin, in the process of contacting [facility] to notify primary

## 2018-05-30 LAB
ABO GROUP BLD BPU: NORMAL
ANION GAP SERPL CALCULATED.3IONS-SCNC: 5 MMOL/L (ref 4–13)
BASOPHILS # BLD AUTO: 0.01 THOUSANDS/ΜL (ref 0–0.1)
BASOPHILS NFR BLD AUTO: 0 % (ref 0–1)
BPU ID: NORMAL
BUN SERPL-MCNC: 53 MG/DL (ref 5–25)
CALCIUM SERPL-MCNC: 8 MG/DL (ref 8.3–10.1)
CHLORIDE SERPL-SCNC: 112 MMOL/L (ref 100–108)
CO2 SERPL-SCNC: 30 MMOL/L (ref 21–32)
CREAT SERPL-MCNC: 2.19 MG/DL (ref 0.6–1.3)
EOSINOPHIL # BLD AUTO: 0.02 THOUSAND/ΜL (ref 0–0.61)
EOSINOPHIL NFR BLD AUTO: 0 % (ref 0–6)
ERYTHROCYTE [DISTWIDTH] IN BLOOD BY AUTOMATED COUNT: 15.3 % (ref 11.6–15.1)
GFR SERPL CREATININE-BSD FRML MDRD: 20 ML/MIN/1.73SQ M
GLUCOSE SERPL-MCNC: 123 MG/DL (ref 65–140)
HCT VFR BLD AUTO: 23.4 % (ref 34.8–46.1)
HCT VFR BLD AUTO: 26.2 % (ref 34.8–46.1)
HGB BLD-MCNC: 7.4 G/DL (ref 11.5–15.4)
HGB BLD-MCNC: 8.4 G/DL (ref 11.5–15.4)
LYMPHOCYTES # BLD AUTO: 1.08 THOUSANDS/ΜL (ref 0.6–4.47)
LYMPHOCYTES NFR BLD AUTO: 7 % (ref 14–44)
MCH RBC QN AUTO: 29.6 PG (ref 26.8–34.3)
MCHC RBC AUTO-ENTMCNC: 31.6 G/DL (ref 31.4–37.4)
MCV RBC AUTO: 94 FL (ref 82–98)
MONOCYTES # BLD AUTO: 1.03 THOUSAND/ΜL (ref 0.17–1.22)
MONOCYTES NFR BLD AUTO: 7 % (ref 4–12)
NEUTROPHILS # BLD AUTO: 12.32 THOUSANDS/ΜL (ref 1.85–7.62)
NEUTS SEG NFR BLD AUTO: 85 % (ref 43–75)
NRBC BLD AUTO-RTO: 0 /100 WBCS
PLATELET # BLD AUTO: 207 THOUSANDS/UL (ref 149–390)
PMV BLD AUTO: 9.9 FL (ref 8.9–12.7)
POTASSIUM SERPL-SCNC: 3.8 MMOL/L (ref 3.5–5.3)
RBC # BLD AUTO: 2.5 MILLION/UL (ref 3.81–5.12)
SODIUM SERPL-SCNC: 147 MMOL/L (ref 136–145)
UNIT DISPENSE STATUS: NORMAL
UNIT PRODUCT CODE: NORMAL
UNIT RH: NORMAL
WBC # BLD AUTO: 14.58 THOUSAND/UL (ref 4.31–10.16)

## 2018-05-30 PROCEDURE — 99222 1ST HOSP IP/OBS MODERATE 55: CPT | Performed by: INTERNAL MEDICINE

## 2018-05-30 PROCEDURE — 99232 SBSQ HOSP IP/OBS MODERATE 35: CPT | Performed by: INTERNAL MEDICINE

## 2018-05-30 PROCEDURE — 85014 HEMATOCRIT: CPT | Performed by: INTERNAL MEDICINE

## 2018-05-30 PROCEDURE — 97110 THERAPEUTIC EXERCISES: CPT

## 2018-05-30 PROCEDURE — 99024 POSTOP FOLLOW-UP VISIT: CPT | Performed by: PHYSICIAN ASSISTANT

## 2018-05-30 PROCEDURE — 85018 HEMOGLOBIN: CPT | Performed by: INTERNAL MEDICINE

## 2018-05-30 PROCEDURE — 80048 BASIC METABOLIC PNL TOTAL CA: CPT | Performed by: ORTHOPAEDIC SURGERY

## 2018-05-30 PROCEDURE — 97112 NEUROMUSCULAR REEDUCATION: CPT

## 2018-05-30 PROCEDURE — 97530 THERAPEUTIC ACTIVITIES: CPT

## 2018-05-30 PROCEDURE — 85025 COMPLETE CBC W/AUTO DIFF WBC: CPT | Performed by: ORTHOPAEDIC SURGERY

## 2018-05-30 RX ORDER — DEXTROSE MONOHYDRATE 50 MG/ML
75 INJECTION, SOLUTION INTRAVENOUS CONTINUOUS
Status: DISCONTINUED | OUTPATIENT
Start: 2018-05-30 | End: 2018-05-31

## 2018-05-30 RX ORDER — OXYCODONE HCL 5 MG/5 ML
2.5 SOLUTION, ORAL ORAL EVERY 4 HOURS PRN
Status: DISCONTINUED | OUTPATIENT
Start: 2018-05-30 | End: 2018-05-30

## 2018-05-30 RX ORDER — QUETIAPINE FUMARATE 25 MG/1
12.5 TABLET, FILM COATED ORAL
Status: DISCONTINUED | OUTPATIENT
Start: 2018-05-30 | End: 2018-06-01 | Stop reason: HOSPADM

## 2018-05-30 RX ORDER — OXYCODONE HYDROCHLORIDE 5 MG/1
2.5 TABLET ORAL EVERY 4 HOURS PRN
Status: DISCONTINUED | OUTPATIENT
Start: 2018-05-30 | End: 2018-06-01 | Stop reason: HOSPADM

## 2018-05-30 RX ORDER — LANOLIN ALCOHOL/MO/W.PET/CERES
3 CREAM (GRAM) TOPICAL
Status: DISCONTINUED | OUTPATIENT
Start: 2018-05-30 | End: 2018-06-01 | Stop reason: HOSPADM

## 2018-05-30 RX ORDER — SENNOSIDES 8.6 MG
2 TABLET ORAL DAILY
Status: DISCONTINUED | OUTPATIENT
Start: 2018-05-30 | End: 2018-06-01 | Stop reason: HOSPADM

## 2018-05-30 RX ADMIN — PREDNISONE 30 MG: 20 TABLET ORAL at 09:58

## 2018-05-30 RX ADMIN — ACETAMINOPHEN 975 MG: 325 TABLET ORAL at 22:22

## 2018-05-30 RX ADMIN — DEXTROSE 75 ML/HR: 5 SOLUTION INTRAVENOUS at 12:18

## 2018-05-30 RX ADMIN — ACETAMINOPHEN 975 MG: 325 TABLET ORAL at 13:11

## 2018-05-30 RX ADMIN — MELATONIN TAB 3 MG 3 MG: 3 TAB at 19:53

## 2018-05-30 RX ADMIN — ENOXAPARIN SODIUM 30 MG: 30 INJECTION SUBCUTANEOUS at 17:20

## 2018-05-30 RX ADMIN — OXYCODONE HYDROCHLORIDE 5 MG: 5 SOLUTION ORAL at 03:26

## 2018-05-30 RX ADMIN — SENNOSIDES 17.2 MG: 8.6 TABLET, FILM COATED ORAL at 12:53

## 2018-05-30 RX ADMIN — BUDESONIDE AND FORMOTEROL FUMARATE DIHYDRATE 2 PUFF: 160; 4.5 AEROSOL RESPIRATORY (INHALATION) at 19:55

## 2018-05-30 RX ADMIN — ATENOLOL 50 MG: 50 TABLET ORAL at 09:58

## 2018-05-30 RX ADMIN — NYSTATIN 1 APPLICATION: 100000 POWDER TOPICAL at 09:59

## 2018-05-30 RX ADMIN — ALLOPURINOL 100 MG: 100 TABLET ORAL at 09:58

## 2018-05-30 RX ADMIN — ACETAMINOPHEN 975 MG: 325 TABLET ORAL at 05:30

## 2018-05-30 RX ADMIN — POTASSIUM CHLORIDE 10 MEQ: 750 TABLET, EXTENDED RELEASE ORAL at 09:58

## 2018-05-30 RX ADMIN — NYSTATIN 1 APPLICATION: 100000 POWDER TOPICAL at 17:20

## 2018-05-30 RX ADMIN — PANTOPRAZOLE SODIUM 40 MG: 40 TABLET, DELAYED RELEASE ORAL at 05:30

## 2018-05-30 RX ADMIN — VITAMIN D, TAB 1000IU (100/BT) 2000 UNITS: 25 TAB at 09:58

## 2018-05-30 NOTE — PROGRESS NOTES
Orthopedics   Nigel Pena 80 y o  female MRN: [id number]  Unit/Bed#: PPHP 930-01      Subjective:  80 y  o female post operative day 2 left femoral intramedullary nail  Patient sleepy, no complaints      Labs:    0  Lab Value Date/Time   HCT 23 4 (L) 05/30/2018 0458   HCT 21 7 (L) 05/29/2018 1136   HCT 20 2 (L) 05/29/2018 0459   HGB 7 4 (L) 05/30/2018 0458   HGB 7 0 (L) 05/29/2018 1136   HGB 6 8 (LL) 05/29/2018 0459   INR 1 06 05/27/2018 0730   WBC 14 58 (H) 05/30/2018 0458   WBC 12 25 (H) 05/29/2018 0459   WBC 10 03 05/28/2018 0519   ESR 92 (H) 05/29/2018 1353       Meds:    Current Facility-Administered Medications:     acetaminophen (TYLENOL) tablet 975 mg, 975 mg, Oral, Q8H [address], Gregory Arguello MD, 975 mg at 05/30/18 0530    albuterol inhalation solution 2 5 mg, 2 5 mg, Nebulization, Q6H PRN, Gregory Arguello MD    allopurinol (ZYLOPRIM) tablet 100 mg, 100 mg, Oral, Daily, Gregory Arguello MD, 100 mg at 05/29/18 0948    aluminum-magnesium hydroxide-simethicone (MYLANTA) 200-200-20 mg/5 mL oral suspension 30 mL, 30 mL, Oral, Q6H PRN, Gregory Arguello MD    atenolol (TENORMIN) tablet 50 mg, 50 mg, Oral, Daily, Gregory Arguello MD, 50 mg at 05/28/18 0849    budesonide-formoterol (SYMBICORT) 160-4 5 mcg/act inhaler 2 puff, 2 puff, Inhalation, BID, Gregory Arguello MD, 2 puff at 05/28/18 2123    cholecalciferol (VITAMIN D3) tablet 2,000 Units, 2,000 Units, Oral, Daily, Gregory Arguello MD, 2,000 Units at 05/29/18 0947    diphenoxylate-atropine (LOMOTIL) 2 5-0 025 mg per tablet 1 tablet, 1 tablet, Oral, Q6H PRN, Gregory Arguello MD    docusate sodium (COLACE) capsule 100 mg, 100 mg, Oral, BID PRN, Gregory Arguello MD    enoxaparin (LOVENOX) subcutaneous injection 30 mg, 30 mg, Subcutaneous, Q24H, Juan Padilla MD, 30 mg at 05/29/18 1831    guaiFENesin (ROBITUSSIN) oral solution 200 mg, 200 mg, Oral, 4x Daily, Gregory Arguello MD, 200 mg at 05/28/18 2122    melatonin tablet 6 mg, 6 mg, Oral, HS, Lou Avery MD, 6 mg at 05/29/18 2031    nystatin (MYCOSTATIN) powder 1 application, 1 application, Topical, BID, Lou Avery MD, 1 application at 96/96/21 1834    ondansetron (ZOFRAN) injection 4 mg, 4 mg, Intravenous, Q6H PRN, Lou Avery MD    oxyCODONE (ROXICODONE) oral solution 5 mg, 5 mg, Oral, Q6H PRN, Lou Avery MD, 5 mg at 05/30/18 0326    pantoprazole (PROTONIX) EC tablet 40 mg, 40 mg, Oral, Early Morning, Mikala Proctor DO, 40 mg at 05/30/18 0530    polyethylene glycol (MIRALAX) packet 17 g, 17 g, Oral, Daily, Lou Avery MD    potassium chloride (K-DUR,KLOR-CON) CR tablet 10 mEq, 10 mEq, Oral, Every Other Day, Lou Avery MD    predniSONE tablet 30 mg, 30 mg, Oral, Daily, Wily Diaz MD, 30 mg at 05/29/18 0947    senna 8 8 mg/5 mL oral syrup 8 8 mg, 8 8 mg, Oral, Daily, Lou Avery MD    silver sulfadiazine (SILVADENE,SSD) 1 % cream 1 application, 1 application, Topical, Daily PRN, Lou Avery MD    Blood Culture:   No results found for: BLOODCX    Wound Culture:   Lab Results   Component Value Date    WOUNDCULT 2+ Growth of Staphylococcus aureus (A) 02/15/2018       Ins and Outs:  I/O last 24 hours: In: 1028 3 [P O :600; Blood:428 3]  Out: 655 [Urine:655]          Physical exam:  Vitals:    05/30/18 0324   BP: 170/86   Pulse: 100   Resp: 18   Temp: 98 2 °F (36 8 °C)   SpO2: 99%     left lower extremity  · Dressing saturated proximally and not present distally  · Changed this AM  · Sensation intact L1-S1  · Motor intact to EHL/FHL  · 2+ dorsalis pedis pulse    _*_*_*_*_*_*_*_*_*_*_*_*_*_*_*_*_*_*_*_*_*_*_*_*_*_*_*_*_*_*_*_*_*_*_*_*_*_*_*_*_*    Assessment: 80 y  o female post operative day 2 left femur IMN   Pt doing well    Plan:  · Up and out of bed  · Weightbearing as tolerated  · PT/OT  · DVT prophylaxis  · Analgesics  · Dispo: Ortho will follow  · Patient noted to have acute blood loss anemia due to a drop in Hbg of > 2 0g from preop levels, will monitor vital signs and resuscitate with IV fluids as needed   · Recommend 2u PRBCs    Keri George PA-C

## 2018-05-30 NOTE — CONSULTS
Consultation - Raoul Bermeo 80 y o  female MRN: [id number]    Unit/Bed#: [facility]PHP 930-01 Encounter: [id number]      Assessment/Plan     Assessment: This is a 80y o -year-old female with left displaced intertrochanteric fracture status post ORIF pod # 2  Hemoglobin 7 4 BUN 53 care today 2 1 GFR 30, glucose 123 , TSH 1 2,  8 ESR 92 vitamin-D 39  She has secondary hyperparathyroidism due to advanced chronic kidney disease    Plan:  phosphate level at this time is 3 , is okay to start calcitriol  She can benefit from Prolia        CC: Own to the bone physician Consult    History of Present Illness     HPI: Raoul Bermeo is a 80y o  year old female extensive list of comorbidities that include CKD stage 4 with creatinine at baseline at this time, diastolic heart failure which is not exacerbated, advanced Alzheimer, hypertension, rate controlled beta-blocker atrial fibrillation, falls, gout, history of SDH early this year fall  Related  Who presented during this admission as a transfer from [place] on May 27 2018 after another fall complaint with left-sided hip pain has been diagnosis with left displaced intertrochanteric fracture who is status post ORIF pod # 2  Endocrinology has been consulted with assistance with osteoporosis  Other patient workup during this admission include:  Hemoglobin 7 4 BUN 53 care today 2 1 GFR 30, glucose 123 , TSH 1 2,  8 ESR 92 vitamin-D 39  Patient's home medications include vitamin D3 1000 units daily  Due to advanced dementia patient is unable to provide accurate history  History had been obtained per chart review mostly    Patient at the current time is awake, she is not alert or oriented           Inpatient consult to Endocrinology     Date/Time 5/30/2018 9:13 AM     Performed by  Traci Perry by Angela Mohr              Review of Systems   Constitutional: Negative for activity change, appetite change, diaphoresis, fatigue, fever and unexpected weight change  HENT: Negative for congestion, nosebleeds, rhinorrhea, sinus pressure, sore throat and trouble swallowing  Eyes: Negative for discharge, itching and visual disturbance  Respiratory: Negative for chest tightness and shortness of breath  Cardiovascular: Negative for chest pain and palpitations  Gastrointestinal: Negative for abdominal distention, abdominal pain, constipation, diarrhea and nausea  Endocrine: Negative for cold intolerance and heat intolerance  Genitourinary: Negative for difficulty urinating  Musculoskeletal: Negative for arthralgias  Allergic/Immunologic: Negative for environmental allergies and food allergies  Neurological: Negative for dizziness and light-headedness  Hematological: Does not bruise/bleed easily  Psychiatric/Behavioral: Negative for behavioral problems and decreased concentration  Historical Information   Past Medical History:   Diagnosis Date    [facility])     Alzheimer's dementia     [facility]     Gout     Hypertension     Renal disorder      Past Surgical History:   Procedure Laterality Date    [place] OPEN RX FEMUR FX+INTRAMED DUSTIN Left 5/28/2018    Procedure: INSERTION NAIL IM FEMUR ANTEGRADE (TROCHANTERIC); Surgeon: Kalie Mohr MD;  Location: [facility];  Service: Orthopedics     Social History   History   Alcohol Use No     History   Drug Use No     History   Smoking Status    Never Smoker   Smokeless Tobacco    Never Used     Family History: History reviewed  No pertinent family history      Meds/Allergies   Current Facility-Administered Medications   Medication Dose Route Frequency Provider Last Rate Last Dose    acetaminophen (TYLENOL) tablet 975 mg  975 mg Oral Q8H [facility] Eduardo Fajardo MD   975 mg at 05/30/18 0530    albuterol inhalation solution 2 5 mg  2 5 mg Nebulization Q6H PRN Eduardo Fajardo MD        allopurinol (ZYLOPRIM) tablet 100 mg  100 mg Oral Daily Lawrence Gruber MD   100 mg at 05/29/18 0948    aluminum-magnesium hydroxide-simethicone (MYLANTA) 200-200-20 mg/5 mL oral suspension 30 mL  30 mL Oral Q6H PRN Lawrence Gruber MD        atenolol (TENORMIN) tablet 50 mg  50 mg Oral Daily Lawrence Gruber MD   50 mg at 05/28/18 0849    budesonide-formoterol (SYMBICORT) 160-4 5 mcg/act inhaler 2 puff  2 puff Inhalation BID Lawrence Gruber MD   2 puff at 05/28/18 2123    cholecalciferol (VITAMIN D3) tablet 2,000 Units  2,000 Units Oral Daily Lawrence Gruber MD   2,000 Units at 05/29/18 0947    diphenoxylate-atropine (LOMOTIL) 2 5-0 025 mg per tablet 1 tablet  1 tablet Oral Q6H PRN Lawrence Gruber MD        docusate sodium (COLACE) capsule 100 mg  100 mg Oral BID PRN Lawrence Gruber MD        enoxaparin (LOVENOX) subcutaneous injection 30 mg  30 mg Subcutaneous Q24H Julio Cesar Borden MD   30 mg at 05/29/18 1831    guaiFENesin (ROBITUSSIN) oral solution 200 mg  200 mg Oral 4x Daily Lawrence Gruber MD   200 mg at 05/28/18 2122    melatonin tablet 6 mg  6 mg Oral HS Lawrence Gruber MD   6 mg at 05/29/18 2031    nystatin (MYCOSTATIN) powder 1 application  1 application Topical BID Larwence Gruber MD   1 application at 40/72/94 1834    ondansetron (ZOFRAN) injection 4 mg  4 mg Intravenous Q6H PRN Lawrence Gruber MD        oxyCODONE (ROXICODONE) oral solution 5 mg  5 mg Oral Q6H PRN Lawrence Gruber MD   5 mg at 05/30/18 0326    pantoprazole (PROTONIX) EC tablet 40 mg  40 mg Oral Early Morning Marcus Ridge, DO   40 mg at 05/30/18 0530    polyethylene glycol (MIRALAX) packet 17 g  17 g Oral Daily Lawrence Gruber MD        potassium chloride (K-DUR,KLOR-CON) CR tablet 10 mEq  10 mEq Oral Every Other Day Lawrence Gruber MD        predniSONE tablet 30 mg  30 mg Oral Daily Nadia Molina MD   30 mg at 05/29/18 0947    senna 8 8 mg/5 mL oral syrup 8 8 mg  8 8 mg Oral Daily Lawrence Gruber MD        silver sulfadiazine (SILVADENE,SSD) 1 % cream 1 application  1 application Topical Daily PRN Mitchel Trejo MD         Allergies   Allergen Reactions    Penicillins Other (See Comments)     Unknown reaction; however, tolerated cefazolin    Rivastigmine        Objective   Vitals: Blood pressure 124/72, pulse 72, temperature 98 1 °F (36 7 °C), temperature source Oral, resp  rate 18, height 5' 2" (1 575 m), weight 86 8 kg (191 lb 5 8 oz), SpO2 97 %  Intake/Output Summary (Last 24 hours) at 05/30/18 0858  Last data filed at 05/30/18 0534   Gross per 24 hour   Intake          1028 33 ml   Output              755 ml   Net           273 33 ml     Invasive Devices     Peripheral Intravenous Line            Peripheral IV 05/29/18 Right Antecubital 1 day                Physical Exam   Constitutional: She is oriented to person, place, and time  She appears well-developed and well-nourished  No distress  HENT:   Head: Normocephalic and atraumatic  Mouth/Throat: Oropharynx is clear and moist  No oropharyngeal exudate  Eyes: Conjunctivae and EOM are normal  Pupils are equal, round, and reactive to light  Right eye exhibits no discharge  Left eye exhibits no discharge  No scleral icterus  Neck: Normal range of motion  Neck supple  No JVD present  No tracheal deviation present  No thyromegaly present  Cardiovascular: Normal rate, regular rhythm, normal heart sounds and intact distal pulses  Exam reveals no gallop and no friction rub  No murmur heard  Pulmonary/Chest: Effort normal and breath sounds normal  No stridor  No respiratory distress  She exhibits no tenderness  Abdominal: Soft  Bowel sounds are normal  She exhibits no distension  There is no tenderness  There is no rebound  Musculoskeletal: Normal range of motion  She exhibits no edema, tenderness or deformity  Lymphadenopathy:     She has no cervical adenopathy  Neurological: She is alert and oriented to person, place, and time  No cranial nerve deficit  Coordination normal    Skin: Skin is warm and dry  No rash noted  She is not diaphoretic  No erythema  Psychiatric: She has a normal mood and affect  Her behavior is normal    Nursing note and vitals reviewed  The history was obtained from the review of the chart    Lab Results:     Results from last 7 days  Lab Units 05/27/18  1950   HEMOGLOBIN A1C % 5 4     Lab Results   Component Value Date    WBC 14 58 (H) 05/30/2018    HGB 7 4 (L) 05/30/2018    HCT 23 4 (L) 05/30/2018    MCV 94 05/30/2018     05/30/2018     Lab Results   Component Value Date/Time    BUN 53 (H) 05/30/2018 04:58 AM     (H) 05/30/2018 04:58 AM    K 3 8 05/30/2018 04:58 AM     (H) 05/30/2018 04:58 AM    CO2 30 05/30/2018 04:58 AM    CREATININE 2 19 (H) 05/30/2018 04:58 AM    AST 16 05/29/2018 01:53 PM    ALT 14 05/29/2018 01:53 PM    ALB 3 1 (L) 05/29/2018 01:53 PM     No results for input(s): CHOL, HDL, LDL, TRIG, VLDL in the last 72 hours  No results found for: Willi Deiters  No results found for: POCGLU    Imaging Studies: I have personally reviewed pertinent reports  Portions of the record may have been created with voice recognition software

## 2018-05-30 NOTE — PROGRESS NOTES
Pt confused and agitated, pulled mepilex dressing off of incision site which partially pulled out two staples  Small amount of bleeding noted from incision site  Cleansed with saline and redressed with ABD  Ortho and SLIM made aware  Soft limb restraints ordered at this time  Will continue to monitor

## 2018-05-30 NOTE — CONSULTS
Consultation - Inpatient Pain Management   Audrey Lowe 80 y o  female MRN: [id number]  Unit/Bed#: [facility] 930-01 Encounter: [id number]               Assessment/Plan     Assessment:     Principal Problem:    Displaced intertrochanteric fracture of left femur, initial encounter for closed fracture [facility])  Active Problems:    Subdural hemorrhage    Alzheimer disease    CHF (congestive heart failure)    HTN (hypertension)    Atrial fibrillation    Stage 4 chronic kidney disease (HCC)    SAH (subarachnoid hemorrhage)    Hypernatremia      Plan/Recommendations:   Acute post-op pain  · Tylenol 975mg Q8 hours   · Agree with dose reduction of Oxycodone, continue Oxycodone 2 5mg Q4 hours PRN breakthrough pain  · Aqua-K pad to lower back PRN comfort  · Ok to use Lidocaine patches for localized pain control if needed, do not place near or on surgical site  · No NSAIDS or muscle relaxants due to age and impaired renal function  · Will evaluate pain and mentation over the next 24 hours, consider starting Low dose Gabapentin at HS of patients level of sedation improves  · CrCl 22 2     Reviewed with SLIM    History of Present Illness    Admit Date:  5/27/2018  Hospital Day:  3 days  Primary Service:  General Medicine  Attending Provider:  Jeniffer Bergeron DO  Physician Requesting Consult: Jeniffer Bergeron DO  Reason for Consult / Principal Problem: post-op pain   HPI: Audrey Lowe is a 80y o  year old female who presents after a mechanical fall with left hip pain  Was found to have a comminuted, intertrochanteric fracture of the proximal left femur  Ortho consulted, patient is s/p left femur IM nail on 5/28/2018  Patient has a history of dementia, history comes from chart review, poor historian  Post-op patient's mental status waxes and wanes, RN staff report patient often moans and cries out then falls asleep  Patient reported left hip pain at the beginning of the interview and then denied pain a few minutes later  I have reviewed the patient's controlled substance dispensing history in the Prescription Drug Monitoring Program in compliance with the [facility] regulations before recommending any controlled substances  Review of Systems:  Denied headache or dizziness  Denied chest pain or SOB  Denied abdominal pain  Unsure of LBM  Denied numbness and tingling in LE    Pain History:  Current pain location(s): left hip  Pain Scale:   Unable to rate; FLACC zero  Severity:  n/a  Quality: n/a  Treatment History:  Patient resting in bed  Appears comfortable at rest  Drowsy and falls asleep during conversation  Unable to clearly articulate details of pain and effectiveness of medication  Current Analgesic regimen:  Tylenol 975mg Q8 hours, Oxycodone PRN (10mg total)  Bowel Regimen: Senna daily, colace PRN     Historical Information   Past Medical History:   Diagnosis Date    A-fib ([facility] )     Alzheimer's dementia     CHF (congestive heart failure) ([facility] )     Gout     Hypertension     Renal disorder      Past Surgical History:   Procedure Laterality Date    NV OPEN RX FEMUR FX+INTRAMED DUSTIN Left 5/28/2018    Procedure: INSERTION NAIL IM FEMUR ANTEGRADE (TROCHANTERIC);   Surgeon: Keo Tuttle MD;  Location: [facility];  Service: Orthopedics     Social History   History   Alcohol Use No     History   Drug Use No     History   Smoking Status    Never Smoker   Smokeless Tobacco    Never Used     Family History: non-contributory    Meds/Allergies   Prior to Admission Medications  Prescriptions Prior to Admission   Medication    allopurinol (ZYLOPRIM) 100 mg tablet    atenolol (TENORMIN) 50 mg tablet    budesonide-formoterol (SYMBICORT) 160-4 5 mcg/act inhaler    cholecalciferol (VITAMIN D3) 1,000 units tablet    diphenoxylate-atropine (LOMOTIL) 2 5-0 025 mg per tablet    docusate sodium (COLACE) 100 mg capsule    furosemide (LASIX) 20 mg tablet    guaiFENesin (ROBITUSSIN) 100 MG/5ML oral liquid    ipratropium-albuterol (DUO-NEB) 0 5-2 5 mg/3 mL    Magnesium 65 MG TABS    Melatonin-Pyridoxine (EQL MELATONIN/VITAMIN B-6) 5-1 MG TABS    moxifloxacin (AVELOX) 400 MG tablet    nystatin (MYCOSTATIN) powder    potassium chloride (K-DUR,KLOR-CON) 10 mEq tablet    predniSONE 10 mg tablet    silver sulfadiazine (SILVADENE,SSD) 1 % cream    sulfamethoxazole-trimethoprim (BACTRIM DS) 800-160 mg per tablet     Hospital Medications  Current Facility-Administered Medications   Medication Dose Route Frequency    acetaminophen (TYLENOL) tablet 975 mg  975 mg Oral Q8H [address]    albuterol inhalation solution 2 5 mg  2 5 mg Nebulization Q6H PRN    allopurinol (ZYLOPRIM) tablet 100 mg  100 mg Oral Daily    aluminum-magnesium hydroxide-simethicone (MYLANTA) 200-200-20 mg/5 mL oral suspension 30 mL  30 mL Oral Q6H PRN    atenolol (TENORMIN) tablet 50 mg  50 mg Oral Daily    budesonide-formoterol (SYMBICORT) 160-4 5 mcg/act inhaler 2 puff  2 puff Inhalation BID    cholecalciferol (VITAMIN D3) tablet 2,000 Units  2,000 Units Oral Daily    dextrose 5 % infusion  75 mL/hr Intravenous Continuous    diphenoxylate-atropine (LOMOTIL) 2 5-0 025 mg per tablet 1 tablet  1 tablet Oral Q6H PRN    docusate sodium (COLACE) capsule 100 mg  100 mg Oral BID PRN    enoxaparin (LOVENOX) subcutaneous injection 30 mg  30 mg Subcutaneous Q24H    melatonin tablet 3 mg  3 mg Oral HS    nystatin (MYCOSTATIN) powder 1 application  1 application Topical BID    ondansetron (ZOFRAN) injection 4 mg  4 mg Intravenous Q6H PRN    oxyCODONE (ROXICODONE) IR tablet 2 5 mg  2 5 mg Oral Q4H PRN    pantoprazole (PROTONIX) EC tablet 40 mg  40 mg Oral Early Morning    potassium chloride (K-DUR,KLOR-CON) CR tablet 10 mEq  10 mEq Oral Every Other Day    senna (SENOKOT) tablet 17 2 mg  2 tablet Oral Daily    silver sulfadiazine (SILVADENE,SSD) 1 % cream 1 application  1 application Topical Daily PRN       Allergies   Allergen Reactions    Penicillins Other (See Comments) Unknown reaction; however, tolerated cefazolin    Rivastigmine        Objective   Temp:  [97 9 °F (36 6 °C)-98 5 °F (36 9 °C)] 98 1 °F (36 7 °C)  HR:  [] 72  Resp:  [18-19] 18  BP: ()/(50-86) 124/72    Intake/Output Summary (Last 24 hours) at 05/30/18 1253  Last data filed at 05/30/18 0944   Gross per 24 hour   Intake          1028 33 ml   Output              755 ml   Net           273 33 ml       Physical Exam:  General Appearance:    NAD   Neurological:   Opens eyes to speech, confused    Head:    Normocephalic, without obvious abnormality, atraumatic   Eyes:    EOM's intact   Lungs:     Decreased, respirations unlabored   Chest Wall:    No tenderness or deformity   Abdomen:        Large, Soft, no distention or tenderness, bowel sounds present    Heart:    Regular rate   Extremities:   Extremities decreased sensation to light touch in LE's   Pulses:   2+ and symmetric all extremities   Skin:   Skin color pale, warm, no rashes or lesions, left hip and thigh incision with dressing     Lab Results:   Results from last 7 days  Lab Units 05/30/18  0458   WBC Thousand/uL 14 58*   HEMOGLOBIN g/dL 7 4*   HEMATOCRIT % 23 4*   PLATELETS Thousands/uL 207      Results from last 7 days  Lab Units 05/30/18  0458 05/29/18  1353   SODIUM mmol/L 147* 145   POTASSIUM mmol/L 3 8 4 3   CHLORIDE mmol/L 112* 112*   CO2 mmol/L 30 26   BUN mg/dL 53* 50*   CREATININE mg/dL 2 19* 2 43*   CALCIUM mg/dL 8 0* 8 3   TOTAL PROTEIN g/dL  --  6 4   BILIRUBIN TOTAL mg/dL  --  0 58   ALK PHOS U/L  --  79   ALT U/L  --  14   AST U/L  --  16   GLUCOSE RANDOM mg/dL 123 162*       Imaging Studies: I have personally reviewed pertinent reports  Counseling / Coordination of Care  Total floor / unit time spent today 45 minutes  Greater than 50% of total time was spent with the patient and / or family counseling and / or coordination of care   A description of the counseling / coordination of care: Reviewed plan of care and medications with patient, RN staff and primary care team     Phil Adam MS, RN-BC  Acute Pain

## 2018-05-30 NOTE — PROGRESS NOTES
[facility] Internal Medicine Progress Note  Patient: Tamika Najera 80 y o  female   MRN: [id number]  PCP: No primary care provider on file  [facility] Encounter: [id number]  Date Of Visit: 05/30/18    Assessment:    Principal Problem:    Displaced intertrochanteric fracture of left femur, initial encounter for closed fracture [facility])  Active Problems:    Subdural hemorrhage    Alzheimer disease    CHF (congestive heart failure)    HTN (hypertension)    Atrial fibrillation    Stage 4 chronic kidney disease (HCC)    SAH (subarachnoid hemorrhage)    Hypernatremia      Plan:    · Displaced Intertrochanteric Fracture Left Femur -   · Procedure - Cephalomedullary Nail Left Proximal Femur done by Dr Shannon Emerson on 5/28/2018  · Mobility - WBAT to LLE  · Pain Control -   · Tylenol 975 mg q8h  · Oxycodone 5 mg every 6 h PRN for severe pain  · Monitor Pain Levels  · Bowel Regimen - senna syrup and miralax scheduled daily  Monitor BMs  · VTE prophylaxis - Lovenox  · Acute Blood Loss Anemia - Will recheck H/H later today and then again in am   If drops significantly, will talk to ortho about potential evaluation of source (any internal bleeding at post op site?)  Also if back pain, would need to consider rule out of retroperitoneal bleed  · Oropharyngeal Dysphagia - speech pathology continues to follow  Recommendation is pureed diet with HTL  Prehospital just ground meats but otherwise family believes that the food was normal consistency  Try to get food / liquid upgraded to baseline  · Chronic Heart Failure -   · Diuretic - normally on lasix 60 mg bid  However, diuretic has been on hold due to IV fluids being given as patient clinically dry  · Beta Blocker - Atenolol  · ACEI / ARB - None due to renal dysfunction  · Monitor I/O and Daily Weights  · Hypernatremia - Run D5W and monitor  Encourage PO fluids at this stage  Monitor I/O    BMP in am   · Alzheimer Dementia with Behavioral Disturbance - restraints placed last night, but will try her off restraints again  Will lower the oxycodone dose but make it available more frequently  Lower melatonin a little bit  Monitor mental status and care supportively  Per family, patient has had sundowning previously both in hospital and at home  · Atrial Fibrillation - Rate Controlled with atenolol  Not on anticoagulation as she has history of intracranial hemorrhage in past   · CKD IV - Baseline Cr appears to be anywhere from 2 - 2 5  Monitor using labs and monitoring of I/O           VTE Pharmacologic Prophylaxis:   Pharmacologic: Enoxaparin (Lovenox)  Mechanical VTE Prophylaxis in Place: Yes    Patient Centered Rounds: I have performed bedside rounds with nursing staff today  Discussions with Specialists or Other Care Team Provider: None    Education and Discussions with Family / Patient: Updated Sharmaine via phone  Main contacts are daughter Ashley Dunn) or son Marlene Dolan) at   Time Spent for Care: 30 minutes  More than 50% of total time spent on counseling and coordination of care as described above  Current Length of Stay: 3 day(s)    Current Patient Status: Inpatient   Certification Statement: The patient will continue to require additional inpatient hospital stay due to evaluation and treatment of above medical conditions  Discharge Plan / Estimated Discharge Date: within next 48 hours? Post op evaluations, treatment of anemia, and monitoring for PO intake with modified diet all needed prior to discharge  Code Status: Level 1 - Full Code      Subjective:   Given transfusion yesterday, Hemoglobin has come up only to 7 4  The patient became confused last night and pulled off surgery site bandage and staple  The patient had some bleeding there but this was then controlled  The patient has no nausea, upset stomach, abdominal pain, chest pain or dyspnea  She has been calling out at times   Tells me that she wants to change position because her back is bothering her  Objective:     Vitals:   Temp (24hrs), Av 2 °F (36 8 °C), Min:97 9 °F (36 6 °C), Max:98 5 °F (36 9 °C)    HR:  [] 72  Resp:  [18-19] 18  BP: ()/(50-86) 124/72  SpO2:  [93 %-99 %] 97 %  Body mass index is 35 kg/m²  Input and Output Summary (last 24 hours): Intake/Output Summary (Last 24 hours) at 18 0857  Last data filed at 18 0534   Gross per 24 hour   Intake          1028 33 ml   Output              755 ml   Net           273 33 ml       Physical Exam:     Physical Exam   Constitutional: No distress  HENT:   Mouth/Throat: Oropharynx is clear and moist  No oropharyngeal exudate  Eyes: Conjunctivae are normal  Pupils are equal, round, and reactive to light  Cardiovascular: Normal rate and regular rhythm  No murmur heard  Pulmonary/Chest: Effort normal and breath sounds normal  She has no wheezes  She has no rales  Abdominal: Soft  Bowel sounds are normal  She exhibits no distension  There is no tenderness  Musculoskeletal: She exhibits tenderness (left hip location  )  She exhibits no edema  Much of the bandage had been removed  Beneath is a clean incision that does not appear to be bleeding currently  Remaining staples intact  The patient has an area of bruising posterior to incision site  Neurological: She is alert  No cranial nerve deficit  She exhibits normal muscle tone  Speech is stuttered at times and slightly dysarthric  She is oriented to self and knows she is at a hospital but  again can not tell me which one  She can not tell me the month or year  Overall appears similar to yesterday mentally  Skin: No rash noted  Psychiatric: She has a normal mood and affect  Vitals reviewed        Additional Data:     Labs:      Results from last 7 days  Lab Units 18  0458   WBC Thousand/uL 14 58*   HEMOGLOBIN g/dL 7 4*   HEMATOCRIT % 23 4*   PLATELETS Thousands/uL 207   NEUTROS PCT % 85*   LYMPHS PCT % 7*   MONOS PCT % 7   EOS PCT % 0       Results from last 7 days  Lab Units 05/30/18  0458 05/29/18  1353   SODIUM mmol/L 147* 145   POTASSIUM mmol/L 3 8 4 3   CHLORIDE mmol/L 112* 112*   CO2 mmol/L 30 26   BUN mg/dL 53* 50*   CREATININE mg/dL 2 19* 2 43*   CALCIUM mg/dL 8 0* 8 3   TOTAL PROTEIN g/dL  --  6 4   BILIRUBIN TOTAL mg/dL  --  0 58   ALK PHOS U/L  --  79   ALT U/L  --  14   AST U/L  --  16   GLUCOSE RANDOM mg/dL 123 162*       Results from last 7 days  Lab Units 05/27/18  0730   INR  1 06       * I Have Reviewed All Lab Data Listed Above  * Additional Pertinent Lab Tests Reviewed: All Labs Within Last 24 Hours Reviewed    Imaging:    Imaging Reports Reviewed Today Include: no new imaging    Imaging Personally Reviewed by Myself Includes:  None    Recent Cultures (last 7 days):           Last 24 Hours Medication List:     Current Facility-Administered Medications:  acetaminophen 975 mg Oral Q8H [facility] Joanna Hernandez MD   albuterol 2 5 mg Nebulization Q6H PRN Joanna Hernandez MD   allopurinol 100 mg Oral Daily Joanna Hernandez MD   aluminum-magnesium hydroxide-simethicone 30 mL Oral Q6H PRN Joanna Hernandez MD   atenolol 50 mg Oral Daily Joanna Hernandez MD   budesonide-formoterol 2 puff Inhalation BID Joanna Hernandez MD   cholecalciferol 2,000 Units Oral Daily Joanna Hernandez MD   diphenoxylate-atropine 1 tablet Oral Q6H PRN Joanna Hernandez MD   docusate sodium 100 mg Oral BID PRN Joanna Hernandez MD   enoxaparin 30 mg Subcutaneous Q24H Mariangel Dacosta MD   guaiFENesin 200 mg Oral 4x Daily Joanna Hernandez MD   melatonin 6 mg Oral HS Joanna Hernandez MD   nystatin 1 application Topical BID Joanna Hernandez MD   ondansetron 4 mg Intravenous Q6H PRN Joanna Hernandez MD   oxyCODONE 5 mg Oral Q6H PRN Joanna Hernandez MD   pantoprazole 40 mg Oral Early Morning Kathe Patch, DO   polyethylene glycol 17 g Oral Daily Joanna Hernandez MD   potassium chloride 10 mEq Oral Every Other Day Joanna Hernandez, MD   predniSONE 30 mg Oral Daily Goran Smith MD   senna 8 8 mg Oral Daily Joanna Hernandez MD   silver sulfadiazine 1 application Topical Daily PRN Joanna Hernandez MD        Today, Patient Was Seen By: Kathe Silva DO    ** Please Note: This note has been constructed using a voice recognition system   **

## 2018-05-30 NOTE — PLAN OF CARE
Problem: PHYSICAL THERAPY ADULT  Goal: Performs mobility at highest level of function for planned discharge setting  See evaluation for individualized goals  Treatment/Interventions: Functional transfer training, LE strengthening/ROM, Therapeutic exercise, Endurance training, Cognitive reorientation, Patient/family training, Equipment eval/education, Bed mobility, Compensatory technique education, Spoke to nursing, Spoke to case management, OT  Equipment Recommended: Syeda Parker, Wheelchair       See flowsheet documentation for full assessment, interventions and recommendations  Outcome: Progressing  Prognosis: Fair  Problem List: Decreased strength, Decreased range of motion, Decreased endurance, Impaired balance, Decreased mobility, Decreased coordination, Decreased cognition, Impaired judgement, Decreased safety awareness, Pain  Assessment: Pt presented to therapy pleasant, supine in bed  pt alert to self, but spoke about a dog and people who were not present  Pt performed supine to sit transfers with max A x 2, but had decreased pain wthout yelling today  Pt then sat at EOB to perform seated exercises with UE support on EOB  Pt initially required min A to prevent retropulsion, but was able to maintain posture throughout exercises without assist   Pt required extensive verbal instructions, visual demonstrations, and tactile assist to initiate movements & perform correctly  Pt stated she had some pain in hip after exercises, but did not rate  Pt then performed sit to stand transfers & 30 sec static standing x 2 with RW and max A x 2  Pt became incontinent of bladder both attempts  Pt then performed scooting at EOB with max A and verbal instructions throghout  Pt then performed sit to supine transfer with max A x 2, afterwards stating pain was 10/10 when questioned  Bed alarms reactivated upon end of treatment    As session ended, pt begame agitated that she was returned to bed, and began to pull sheets off, attempting to rise repeatedly, but was calmed down with verbal instructions to remain in bed for safety  Nursing alerted to pt's status at end of treatment  Barriers to Discharge: Decreased caregiver support  Barriers to Discharge Comments: questional level of assist prior at [facility]? Recommendation: Post acute IP rehab     PT - OK to Discharge:  (to rehab when medically cleared)    See flowsheet documentation for full assessment

## 2018-05-30 NOTE — PLAN OF CARE
DISCHARGE PLANNING     Discharge to home or other facility with appropriate resources Progressing        DISCHARGE PLANNING - CARE MANAGEMENT     Discharge to post-acute care or home with appropriate resources Progressing        INFECTION - ADULT     Absence or prevention of progression during hospitalization Progressing     Absence of fever/infection during neutropenic period Progressing        Knowledge Deficit     Patient/family/caregiver demonstrates understanding of disease process, treatment plan, medications, and discharge instructions Progressing        PAIN - ADULT     Verbalizes/displays adequate comfort level or baseline comfort level Progressing        Potential for Falls     Patient will remain free of falls Progressing        Prexisting or High Potential for Compromised Skin Integrity     Skin integrity is maintained or improved Progressing        SAFETY ADULT     Maintain or return to baseline ADL function Progressing     Maintain or return mobility status to optimal level Progressing        SAFETY,RESTRAINT: NV/NON-SELF DESTRUCTIVE BEHAVIOR     Remains free of harm/injury (restraint for non violent/non self-detsructive behavior) Progressing     Returns to optimal restraint-free functioning Progressing

## 2018-05-30 NOTE — PHYSICAL THERAPY NOTE
Physical Therapy Progress Note     05/30/18 0410   Pain Assessment   Pain Assessment 0-10   Pain Score Worst Possible Pain   Pain Type Acute pain   Pain Location Hip;Leg   Pain Orientation Left   Pain Frequency Intermittent   Hospital Pain Intervention(s) Repositioned; Ambulation/increased activity; Distraction   Response to Interventions increaed with activity, decreased with rest   Restrictions/Precautions   Weight Bearing Precautions Per Order Yes   LLE Weight Bearing Per Order WBAT   Other Precautions Impulsive;Cognitive; Bed Alarm;Aspiration;Multiple lines; Fall Risk   General   Chart Reviewed Yes   Family/Caregiver Present No   Subjective   Subjective Pt able to recall name, but repeatedly spoke about a dog that was not present  Bed Mobility   Supine to Sit 2  Maximal assistance   Additional items Assist x 2   Sit to Supine 2  Maximal assistance   Additional items Assist x 2   Transfers   Sit to Stand 2  Maximal assistance   Additional items Assist x 2   Stand to Sit 2  Maximal assistance   Additional items Assist x 2   Other 2  Maximal assistance  (lateral scoots along EOB)   Additional items Assist x 2   Balance   Static Sitting Fair -   Dynamic Sitting Fair -   Static Standing Poor -   Dynamic Standing Poor -   Endurance Deficit   Endurance Deficit Yes   Endurance Deficit Description fatigue, pain, LE weakness   Activity Tolerance   Activity Tolerance Patient limited by fatigue   Nurse [facility], RN   Exercises   Hip Flexion Sitting;20 reps;Bilateral;AROM  (10 x 2)   Hip Abduction Sitting;20 reps;AROM; Bilateral  (10 x 2)   Hip Adduction Sitting;20 reps;AROM; Bilateral  (10 x 2)   Knee AROM Long Arc Quad Sitting;20 reps;AROM; Bilateral  (10 x 2)   Ankle Pumps Sitting;20 reps;AROM; Bilateral  (10 x 2)   Balance training  seated EOB with dynamic LE movements, static standing   Assessment   Prognosis Fair   Problem List Decreased strength;Decreased range of motion;Decreased endurance; Impaired balance;Decreased mobility; Decreased coordination;Decreased cognition; Impaired judgement;Decreased safety awareness;Pain   Assessment Pt presented to therapy pleasant, supine in bed  pt alert to self, but spoke about a dog and people who were not present  Pt performed supine to sit transfers with max A x 2, but had decreased pain wthout yelling today  Pt then sat at EOB to perform seated exercises with UE support on EOB  Pt initially required min A to prevent retropulsion, but was able to maintain posture throughout exercises without assist   Pt required extensive verbal instructions, visual demonstrations, and tactile assist to initiate movements & perform correctly  Pt stated she had some pain in hip after exercises, but did not rate  Pt then performed sit to stand transfers & 30 sec static standing x 2 with RW and max A x 2  Pt became incontinent of bladder both attempts  Pt then performed scooting at EOB with max A and verbal instructions throghout  Pt then performed sit to supine transfer with max A x 2, afterwards stating pain was 10/10 when questioned  Bed alarms reactivated upon end of treatment  As session ended, pt begame agitated that she was returned to bed, and began to pull sheets off, attempting to rise repeatedly, but was calmed down with verbal instructions to remain in bed for safety  Nursing alerted to pt's status at end of treatment  Barriers to Discharge Decreased caregiver support   Goals   Patient Goals To decrease pain  STG Expiration Date 06/12/18   Treatment Day 1   Plan   Treatment/Interventions Functional transfer training;LE strengthening/ROM; Therapeutic exercise;Cognitive reorientation;Patient/family training;Equipment eval/education; Bed mobility   Progress Progressing toward goals   PT Frequency (3-5x/week, as tolerated)   Recommendation   Recommendation Post acute IP rehab   Equipment Recommended Walker   PT - OK to Discharge (to rehab when medically cleared)     Adonis Yap Nevils, PTA

## 2018-05-31 LAB
ANION GAP SERPL CALCULATED.3IONS-SCNC: 6 MMOL/L (ref 4–13)
BASOPHILS # BLD AUTO: 0.01 THOUSANDS/ΜL (ref 0–0.1)
BASOPHILS NFR BLD AUTO: 0 % (ref 0–1)
BUN SERPL-MCNC: 39 MG/DL (ref 5–25)
CALCIUM SERPL-MCNC: 8.3 MG/DL (ref 8.3–10.1)
CHLORIDE SERPL-SCNC: 110 MMOL/L (ref 100–108)
CO2 SERPL-SCNC: 28 MMOL/L (ref 21–32)
CREAT SERPL-MCNC: 1.62 MG/DL (ref 0.6–1.3)
EOSINOPHIL # BLD AUTO: 0.08 THOUSAND/ΜL (ref 0–0.61)
EOSINOPHIL NFR BLD AUTO: 1 % (ref 0–6)
ERYTHROCYTE [DISTWIDTH] IN BLOOD BY AUTOMATED COUNT: 15.7 % (ref 11.6–15.1)
GFR SERPL CREATININE-BSD FRML MDRD: 29 ML/MIN/1.73SQ M
GLUCOSE SERPL-MCNC: 101 MG/DL (ref 65–140)
HCT VFR BLD AUTO: 23.8 % (ref 34.8–46.1)
HGB BLD-MCNC: 7.8 G/DL (ref 11.5–15.4)
IMM GRANULOCYTES # BLD AUTO: 0.18 THOUSAND/UL (ref 0–0.2)
IMM GRANULOCYTES NFR BLD AUTO: 1 % (ref 0–2)
LYMPHOCYTES # BLD AUTO: 1.27 THOUSANDS/ΜL (ref 0.6–4.47)
LYMPHOCYTES NFR BLD AUTO: 10 % (ref 14–44)
MCH RBC QN AUTO: 30.8 PG (ref 26.8–34.3)
MCHC RBC AUTO-ENTMCNC: 32.8 G/DL (ref 31.4–37.4)
MCV RBC AUTO: 94 FL (ref 82–98)
MONOCYTES # BLD AUTO: 0.88 THOUSAND/ΜL (ref 0.17–1.22)
MONOCYTES NFR BLD AUTO: 7 % (ref 4–12)
NEUTROPHILS # BLD AUTO: 10.83 THOUSANDS/ΜL (ref 1.85–7.62)
NEUTS SEG NFR BLD AUTO: 81 % (ref 43–75)
NRBC BLD AUTO-RTO: 0 /100 WBCS
PLATELET # BLD AUTO: 233 THOUSANDS/UL (ref 149–390)
PMV BLD AUTO: 9.9 FL (ref 8.9–12.7)
POTASSIUM SERPL-SCNC: 4.1 MMOL/L (ref 3.5–5.3)
RBC # BLD AUTO: 2.53 MILLION/UL (ref 3.81–5.12)
SODIUM SERPL-SCNC: 144 MMOL/L (ref 136–145)
WBC # BLD AUTO: 13.25 THOUSAND/UL (ref 4.31–10.16)

## 2018-05-31 PROCEDURE — 85025 COMPLETE CBC W/AUTO DIFF WBC: CPT | Performed by: ORTHOPAEDIC SURGERY

## 2018-05-31 PROCEDURE — 97535 SELF CARE MNGMENT TRAINING: CPT

## 2018-05-31 PROCEDURE — 97530 THERAPEUTIC ACTIVITIES: CPT

## 2018-05-31 PROCEDURE — 99024 POSTOP FOLLOW-UP VISIT: CPT | Performed by: PHYSICIAN ASSISTANT

## 2018-05-31 PROCEDURE — 80048 BASIC METABOLIC PNL TOTAL CA: CPT | Performed by: ORTHOPAEDIC SURGERY

## 2018-05-31 PROCEDURE — 97110 THERAPEUTIC EXERCISES: CPT

## 2018-05-31 PROCEDURE — 99232 SBSQ HOSP IP/OBS MODERATE 35: CPT | Performed by: INTERNAL MEDICINE

## 2018-05-31 RX ORDER — CALCITRIOL 0.25 UG/1
0.25 CAPSULE, LIQUID FILLED ORAL DAILY
Status: DISCONTINUED | OUTPATIENT
Start: 2018-05-31 | End: 2018-06-01 | Stop reason: HOSPADM

## 2018-05-31 RX ADMIN — ENOXAPARIN SODIUM 30 MG: 30 INJECTION SUBCUTANEOUS at 17:19

## 2018-05-31 RX ADMIN — OXYCODONE HYDROCHLORIDE 2.5 MG: 5 TABLET ORAL at 08:02

## 2018-05-31 RX ADMIN — PANTOPRAZOLE SODIUM 40 MG: 40 TABLET, DELAYED RELEASE ORAL at 06:03

## 2018-05-31 RX ADMIN — CALCITRIOL 0.25 MCG: 0.25 CAPSULE, LIQUID FILLED ORAL at 09:40

## 2018-05-31 RX ADMIN — NYSTATIN 1 APPLICATION: 100000 POWDER TOPICAL at 09:43

## 2018-05-31 RX ADMIN — DEXTROSE 75 ML/HR: 5 SOLUTION INTRAVENOUS at 02:45

## 2018-05-31 RX ADMIN — VITAMIN D, TAB 1000IU (100/BT) 2000 UNITS: 25 TAB at 08:02

## 2018-05-31 RX ADMIN — ALLOPURINOL 100 MG: 100 TABLET ORAL at 09:40

## 2018-05-31 RX ADMIN — ACETAMINOPHEN 975 MG: 325 TABLET ORAL at 13:46

## 2018-05-31 RX ADMIN — BUDESONIDE AND FORMOTEROL FUMARATE DIHYDRATE 2 PUFF: 160; 4.5 AEROSOL RESPIRATORY (INHALATION) at 08:20

## 2018-05-31 RX ADMIN — NYSTATIN 1 APPLICATION: 100000 POWDER TOPICAL at 17:19

## 2018-05-31 RX ADMIN — ACETAMINOPHEN 975 MG: 325 TABLET ORAL at 21:10

## 2018-05-31 RX ADMIN — ATENOLOL 50 MG: 50 TABLET ORAL at 08:02

## 2018-05-31 RX ADMIN — ACETAMINOPHEN 975 MG: 325 TABLET ORAL at 06:03

## 2018-05-31 RX ADMIN — SENNOSIDES 17.2 MG: 8.6 TABLET, FILM COATED ORAL at 08:02

## 2018-05-31 RX ADMIN — MELATONIN TAB 3 MG 3 MG: 3 TAB at 21:10

## 2018-05-31 RX ADMIN — BUDESONIDE AND FORMOTEROL FUMARATE DIHYDRATE 2 PUFF: 160; 4.5 AEROSOL RESPIRATORY (INHALATION) at 19:28

## 2018-05-31 RX ADMIN — IRON SUCROSE 200 MG: 20 INJECTION, SOLUTION INTRAVENOUS at 19:28

## 2018-05-31 NOTE — PHYSICAL THERAPY NOTE
Physical Therapy Progress Note     05/31/18 5006   Pain Assessment   Pain Assessment FLACC   Pain Location Hip;Leg   Pain Orientation Left   Pain Frequency Intermittent   Effect of Pain on Daily Activities limited mobility   Patient's Stated Pain Goal No pain   Hospital Pain Intervention(s) Repositioned;Distraction; Emotional support; Rest   Response to Interventions increased while mobile, decreased with rest   Pain Rating: FLACC (Rest) - Face 0   Pain Rating: FLACC (Rest) - Legs 1   Pain Rating: FLACC (Rest) - Activity 0   Pain Rating: FLACC (Rest) - Cry 0   Pain Rating: FLACC (Rest) - Consolability 1   Score: FLACC (Rest) 2   Pain Rating: FLACC (Activity) - Face 1   Pain Rating: FLACC (Activity) - Legs 1   Pain Rating: FLACC (Activity) - Activity 1   Pain Rating: FLACC (Activity) - Cry 1   Pain Rating: FLACC (Activity) - Consolability 1   Score: FLACC (Activity) 5   Restrictions/Precautions   Weight Bearing Precautions Per Order Yes   LLE Weight Bearing Per Order WBAT   Other Precautions Impulsive;Cognitive; Chair Alarm; Bed Alarm;Aspiration;Multiple lines; Fall Risk;Pain   General   Chart Reviewed Yes   Family/Caregiver Present No   Subjective   Subjective Pt oriented to self and able to recall working with physical therapy yesterday, but unable to state where she was when prompted     Bed Mobility   Rolling R 2  Maximal assistance   Additional items Assist x 2   Supine to Sit 2  Maximal assistance   Additional items Assist x 2   Sit to Supine 2  Maximal assistance   Additional items Assist x 2   Transfers   Sit to Stand 2  Maximal assistance   Additional items Assist x 2   Stand to Sit 2  Maximal assistance   Additional items Assist x 2   Stand pivot 2  Maximal assistance   Additional items Assist x 2   Other 2  Maximal assistance  (scooting forward/backward at EOB and in chair)   Additional items Assist x 2   Balance   Static Sitting Fair -   Dynamic Sitting Fair -   Static Standing Poor -   Dynamic Standing Poor - Endurance Deficit   Endurance Deficit Yes   Endurance Deficit Description fatigue, pain, LE weakness   Activity Tolerance   Activity Tolerance Patient limited by pain; Patient limited by fatigue   Nurse Judy Stringer RN   Exercises   Hip Flexion Supine;20 reps;AAROM; Bilateral   Hip Abduction Supine;20 reps;AAROM; Bilateral   Knee AROM Long Arc Quad Sitting;20 reps;AROM; Bilateral   Ankle Pumps Sitting;AROM; Bilateral  (x 45 seconds)   Assessment   Prognosis Fair   Problem List Decreased strength;Decreased range of motion;Decreased endurance; Impaired balance;Decreased mobility; Decreased coordination;Decreased cognition; Impaired judgement;Decreased safety awareness;Pain   Assessment Pt presented to therapy supine in bed with nurse preparing to change dressings & begin new IV due to pt removing existing ones  Pt performed rolling R with max A & held R sidelying during wound dressing  Pt occaionally complained of pain, but was able to maintain position  Pt then performed supine to sit transfer with considerable assist for trunk control and LE management, but pt able to follow simple commands to assist transfer  Pt able to maintain seated position today without assist during exercises and did not complain of pain while seated  Pt then performed sit to stand and stand pivot transfer with max A x 2 & RW  Demonstrated difficulty weight shifting and advancing LEs during transfer due to increased pain with weight bearing  Pt able to perform transfer safely by following simple commands  Pt performed therapeutic exercises while supine and seated, requiring considerable verbal commands and tactile cues to perform correctly and remain on task  Session ended with pt seated in chair with chair alarm on, nursing alerted to condition  Barriers to Discharge Decreased caregiver support   Goals   Patient Goals To feel mended     [facility] Expiration Date 06/12/18   Treatment Day 2   Plan   Treatment/Interventions Functional transfer training;LE strengthening/ROM; Therapeutic exercise; Endurance training;Cognitive reorientation;Patient/family training;Equipment eval/education; Bed mobility   Progress Progressing toward goals   PT Frequency (3-5x/week, as tolerated)   Recommendation   Recommendation Post acute IP rehab   Equipment Recommended Walker; Wheelchair   PT - OK to Discharge (to rehab when medically cleared)     Eileen Burnett, PTA

## 2018-05-31 NOTE — SOCIAL WORK
Pt is approved at [facility]  Pt is set up at 1030am via [facility]  Dr Greg Antunez made aware  Call to pts dtr Sharmaine and she is aware of dc

## 2018-05-31 NOTE — PLAN OF CARE
DISCHARGE PLANNING     Discharge to home or other facility with appropriate resources Progressing        DISCHARGE PLANNING - CARE MANAGEMENT     Discharge to post-acute care or home with appropriate resources Progressing        INFECTION - ADULT     Absence or prevention of progression during hospitalization Progressing     Absence of fever/infection during neutropenic period Progressing        Knowledge Deficit     Patient/family/caregiver demonstrates understanding of disease process, treatment plan, medications, and discharge instructions Progressing        Nutrition/Hydration-ADULT     Nutrient/Hydration intake appropriate for improving, restoring or maintaining nutritional needs Progressing        PAIN - ADULT     Verbalizes/displays adequate comfort level or baseline comfort level Progressing        Potential for Falls     Patient will remain free of falls Progressing        Prexisting or High Potential for Compromised Skin Integrity     Skin integrity is maintained or improved Progressing        SAFETY ADULT     Maintain or return to baseline ADL function Progressing     Maintain or return mobility status to optimal level Progressing        SAFETY,RESTRAINT: NV/NON-SELF DESTRUCTIVE BEHAVIOR     Remains free of harm/injury (restraint for non violent/non self-detsructive behavior) Progressing     Returns to optimal restraint-free functioning Progressing

## 2018-05-31 NOTE — PROGRESS NOTES
[facility] Internal Medicine Progress Note  Patient: Raoul Bermeo 80 y o  female   MRN: [id number]  PCP: No primary care provider on file  Unit/Bed#: [facility] 930-01 Encounter: [id number]  Date Of Visit: 05/31/18    Assessment:    Principal Problem:    Displaced intertrochanteric fracture of left femur, initial encounter for closed fracture [facility])  Active Problems:    Subdural hemorrhage    Alzheimer disease    CHF (congestive heart failure)    HTN (hypertension)    Atrial fibrillation    Stage 4 chronic kidney disease (HCC)    SAH (subarachnoid hemorrhage)    Hypernatremia      Plan:    · Displaced Intertrochanteric Fracture Left Femur -   · Procedure - Cephalomedullary Nail Left Proximal Femur done by Dr Maurisio Zhang on 5/28/2018  · Mobility - WBAT to LLE  · Pain Control -   · Tylenol 975 mg q8h  · Oxycodone 2 5 mg PRN for severe pain  · Acute Pain service following  · Monitor Pain Levels  · Bowel Regimen - senna syrup and miralax scheduled daily  Monitor BMs  · VTE prophylaxis - Lovenox  · Acute Blood Loss Anemia - Give IV iron today and tomorrow morning  Will check CBC in am   · Oropharyngeal Dysphagia - speech pathology continues to follow  Recommendation is pureed diet with HTL  Prehospital just ground meats but otherwise family believes that the food was normal consistency  Try to get food / liquid upgraded to baseline  · Chronic Heart Failure, Likely Diastolic -   · Diuretic - normally on lasix 60 mg bid  However, diuretic has been on hold due to IV fluids being given as patient clinically dry  · Beta Blocker - Atenolol  · ACEI / ARB - None due to renal dysfunction  · Monitor I/O and Daily Weights  · Hypernatremia - Stop IV fluids and Encourage PO fluids at this stage  Monitor I/O  BMP in am   · Alzheimer Dementia with Behavioral Disturbance - Supportive care plan and redirection  Avoid restraint use    Per family, patient has had sundowning previously both in hospital and at home   · Atrial Fibrillation - Rate Controlled with atenolol  Not on anticoagulation as she has history of intracranial hemorrhage in past   · CKD IV - Baseline Cr appears to be anywhere from 2 - 2 5  Monitor using labs and monitoring of I/O           VTE Pharmacologic Prophylaxis:   Pharmacologic: Enoxaparin (Lovenox)  Mechanical VTE Prophylaxis in Place: Yes    Patient Centered Rounds: I have performed bedside rounds with nursing staff today  Discussions with Specialists or Other Care Team Provider: None    Education and Discussions with Family / Patient: Updated Sharmaine via phone  Main contacts are daughter Colin Ackerman or son Zoey Rawls at   Time Spent for Care: 30 minutes  More than 50% of total time spent on counseling and coordination of care as described above  Current Length of Stay: 4 day(s)    Current Patient Status: Inpatient   Certification Statement: The patient will continue to require additional inpatient hospital stay due to evaluation and treatment of above medical conditions  Discharge Plan / Estimated Discharge Date: within next 24 hours  Code Status: Level 1 - Full Code      Subjective: Today the patient is sitting in chair and appears to be in much brighter spirits and no acute distress or pain  She is denying pain today  She feels that she has been eating ok but I will also continue to have nursing follow her PO intakes  No nausea, dyspnea, or other complaints  Objective:     Vitals:   Temp (24hrs), Av 1 °F (36 7 °C), Min:98 1 °F (36 7 °C), Max:98 2 °F (36 8 °C)    HR:  [82-91] 82  Resp:  [16-18] 17  BP: (117-166)/(69-94) 160/93  SpO2:  [96 %-99 %] 99 %  Body mass index is 35 kg/m²  Input and Output Summary (last 24 hours):        Intake/Output Summary (Last 24 hours) at 18 1023  Last data filed at 18 0929   Gross per 24 hour   Intake              500 ml   Output             1177 ml   Net             -677 ml       Physical Exam:     Physical Exam Constitutional: No distress  HENT:   Mouth/Throat: Oropharynx is clear and moist  No oropharyngeal exudate  Eyes: Conjunctivae are normal  Pupils are equal, round, and reactive to light  Cardiovascular: Normal rate and regular rhythm  No murmur heard  Pulmonary/Chest: Effort normal and breath sounds normal  She has no wheezes  She has no rales  Abdominal: Soft  Bowel sounds are normal  She exhibits no distension  There is no tenderness  Musculoskeletal: She exhibits no edema or tenderness  Clean incision site  No drainage  Unchanged periwound bruising  Neurological: She is alert  No cranial nerve deficit  She exhibits normal muscle tone  Speech is stuttered at times and slightly dysarthric  She tells me when asked where she is that she is in "[facility] or [place]"  She can not tell me month or year  When I tell her it is 5/31 and ask what tomorrow would be, she can not tell me  Psychiatric: She has a normal mood and affect  Vitals reviewed  Additional Data:     Labs:      Results from last 7 days  Lab Units 05/31/18  0453   WBC Thousand/uL 13 25*   HEMOGLOBIN g/dL 7 8*   HEMATOCRIT % 23 8*   PLATELETS Thousands/uL 233   NEUTROS PCT % 81*   LYMPHS PCT % 10*   MONOS PCT % 7   EOS PCT % 1       Results from last 7 days  Lab Units 05/31/18  0453  05/29/18  1353   SODIUM mmol/L 144  < > 145   POTASSIUM mmol/L 4 1  < > 4 3   CHLORIDE mmol/L 110*  < > 112*   CO2 mmol/L 28  < > 26   BUN mg/dL 39*  < > 50*   CREATININE mg/dL 1 62*  < > 2 43*   CALCIUM mg/dL 8 3  < > 8 3   TOTAL PROTEIN g/dL  --   --  6 4   BILIRUBIN TOTAL mg/dL  --   --  0 58   ALK PHOS U/L  --   --  79   ALT U/L  --   --  14   AST U/L  --   --  16   GLUCOSE RANDOM mg/dL 101  < > 162*   < > = values in this interval not displayed  Results from last 7 days  Lab Units 05/27/18  0730   INR  1 06       * I Have Reviewed All Lab Data Listed Above  * Additional Pertinent Lab Tests Reviewed:  All Labs Within Last 24 Hours Reviewed    Imaging:    Imaging Reports Reviewed Today Include: no new imaging  Imaging Personally Reviewed by Myself Includes:  None    Recent Cultures (last 7 days):           Last 24 Hours Medication List:     Current Facility-Administered Medications:  acetaminophen 975 mg Oral Q8H [address] Alma Winchester MD    allopurinol 100 mg Oral Daily Alam Winchester MD    aluminum-magnesium hydroxide-simethicone 30 mL Oral Q6H PRN Alma Winchester MD    atenolol 50 mg Oral Daily Alma Winchester MD    budesonide-formoterol 2 puff Inhalation BID Alma Winchester MD    calcitriol 0 25 mcg Oral Daily Cee MD Cal    cholecalciferol 2,000 Units Oral Daily Alma Winchester MD    dextrose 75 mL/hr Intravenous Continuous Sia Wallis DO Last Rate: 75 mL/hr (05/31/18 0245)   diphenoxylate-atropine 1 tablet Oral Q6H PRN Alma Winchester MD    docusate sodium 100 mg Oral BID PRN Alma Winchester MD    enoxaparin 30 mg Subcutaneous Q24H Kush Marte MD    melatonin 3 mg Oral HS Sia Wallis DO    nystatin 1 application Topical BID Alma Winchester MD    ondansetron 4 mg Intravenous Q6H PRN Alma Winchester MD    oxyCODONE 2 5 mg Oral Q4H PRN Sia Wallis DO    pantoprazole 40 mg Oral Early Morning Sia Wallis DO    potassium chloride 10 mEq Oral Every Other Day Alma Winchester MD    QUEtiapine 12 5 mg Oral HS PRN Sia Wallis DO    senna 2 tablet Oral Daily Sia Wallis DO    silver sulfadiazine 1 application Topical Daily PRN Alma Winchester MD         Today, Patient Was Seen By: Sia Wallis DO    ** Please Note: This note has been constructed using a voice recognition system   **

## 2018-05-31 NOTE — PROGRESS NOTES
Orthopedics   Doneta Axon 80 y o  female MRN: [id number]  Unit/Bed#: [facility] 930-01      Subjective:  80 y  o female post operative day 3 left femoral intramedullary nail  No issues overnight  Denies lower extremity pain      Labs:    0  Lab Value Date/Time   HCT 23 8 (L) 05/31/2018 0453   HCT 26 2 (L) 05/30/2018 1317   HCT 23 4 (L) 05/30/2018 0458   HGB 7 8 (L) 05/31/2018 0453   HGB 8 4 (L) 05/30/2018 1317   HGB 7 4 (L) 05/30/2018 0458   INR 1 06 05/27/2018 0730   WBC 13 25 (H) 05/31/2018 0453   WBC 14 58 (H) 05/30/2018 0458   WBC 12 25 (H) 05/29/2018 0459   ESR 92 (H) 05/29/2018 1353       Meds:    Current Facility-Administered Medications:     acetaminophen (TYLENOL) tablet 975 mg, 975 mg, Oral, Q8H [address], Lindsey Muir MD, 975 mg at 05/31/18 0603    allopurinol (ZYLOPRIM) tablet 100 mg, 100 mg, Oral, Daily, Lindsey Muir MD, 100 mg at 05/30/18 0958    aluminum-magnesium hydroxide-simethicone (MYLANTA) 200-200-20 mg/5 mL oral suspension 30 mL, 30 mL, Oral, Q6H PRN, Lindsey Muir MD    atenolol (TENORMIN) tablet 50 mg, 50 mg, Oral, Daily, Lindsey Muir MD, 50 mg at 05/30/18 0958    budesonide-formoterol (SYMBICORT) 160-4 5 mcg/act inhaler 2 puff, 2 puff, Inhalation, BID, Lindsey Muir MD, 2 puff at 05/30/18 1955    cholecalciferol (VITAMIN D3) tablet 2,000 Units, 2,000 Units, Oral, Daily, Lindsey Muir MD, 2,000 Units at 05/30/18 0958    dextrose 5 % infusion, 75 mL/hr, Intravenous, Continuous, Darnell John DO, Last Rate: 75 mL/hr at 05/31/18 0245, 75 mL/hr at 05/31/18 0245    diphenoxylate-atropine (LOMOTIL) 2 5-0 025 mg per tablet 1 tablet, 1 tablet, Oral, Q6H PRN, Lindsey Muir MD    docusate sodium (COLACE) capsule 100 mg, 100 mg, Oral, BID PRN, Lindsey Muir MD    enoxaparin (LOVENOX) subcutaneous injection 30 mg, 30 mg, Subcutaneous, Q24H, Serge Kauffman MD, 30 mg at 05/30/18 1720    melatonin tablet 3 mg, 3 mg, Oral, HS, Darnell John DO, 3 mg at 05/30/18 1953    nystatin (MYCOSTATIN) powder 1 application, 1 application, Topical, BID, Pascual Bernard MD, 1 application at 14/18/92 1720    ondansetron (ZOFRAN) injection 4 mg, 4 mg, Intravenous, Q6H PRN, Pascual Bernard MD    oxyCODONE (ROXICODONE) IR tablet 2 5 mg, 2 5 mg, Oral, Q4H PRN, Melanie Atkins, DO    pantoprazole (PROTONIX) EC tablet 40 mg, 40 mg, Oral, Early Morning, Melanie Atkins, DO, 40 mg at 05/31/18 0603    potassium chloride (K-DUR,KLOR-CON) CR tablet 10 mEq, 10 mEq, Oral, Every Other Day, Pascual Bernard MD, 10 mEq at 05/30/18 5278    QUEtiapine (SEROquel) tablet 12 5 mg, 12 5 mg, Oral, HS PRN, Melanie Atkins, DO    senna (SENOKOT) tablet 17 2 mg, 2 tablet, Oral, Daily, Melanie Atkins, DO, 17 2 mg at 05/30/18 1253    silver sulfadiazine (SILVADENE,SSD) 1 % cream 1 application, 1 application, Topical, Daily PRN, Pascual Bernard MD    Blood Culture:   No results found for: BLOODCX    Wound Culture:   Lab Results   Component Value Date    WOUNDCULT 2+ Growth of Staphylococcus aureus (A) 02/15/2018       Ins and Outs:  I/O last 24 hours: In: 928 3 [P O :500; Blood:428 3]  Out: 1246 [Urine:1246]          Physical exam:  Vitals:    05/31/18 0004   BP: 117/69   Pulse: 85   Resp: 16   Temp: 98 1 °F (36 7 °C)   SpO2: 96%     left lower extremity  · Dressing saturated, incisions intact, no erythema, no tenderness to palpation  · Sensation intact L1-S1  · Motor intact to EHL/FHL  · 2+ dorsalis pedis pulse  · Calves non-tender to palpation    _*_*_*_*_*_*_*_*_*_*_*_*_*_*_*_*_*_*_*_*_*_*_*_*_*_*_*_*_*_*_*_*_*_*_*_*_*_*_*_*_*    Assessment: 80 y  o female post operative day 3 left femur IMN    Plan:  · Up and out of bed  · Weightbearing as tolerated  · PT/OT  · DVT prophylaxis  · Analgesics as needed  · Dispo: Ortho will follow  · Patient noted to have acute blood loss anemia due to a drop in Hbg of > 2 0g from preop levels, will monitor vital signs and resuscitate with IV fluids as needed Laurie Joya PA-C

## 2018-05-31 NOTE — OCCUPATIONAL THERAPY NOTE
Occupational Therapy Treatment Note:       05/31/18 1025   Restrictions/Precautions   LLE Weight Bearing Per Order WBAT   Pain Assessment   Pain Assessment FLACC   Pain Rating: FLACC (Rest) - Face 0   Pain Rating: FLACC (Rest) - Legs 0   Pain Rating: FLACC (Rest) - Activity 0   Pain Rating: FLACC (Rest) - Cry 0   Pain Rating: FLACC (Rest) - Consolability 0   Score: FLACC (Rest) 0   Pain Rating: FLACC (Activity) - Face 1   Pain Rating: FLACC (Activity) - Legs 0   Pain Rating: FLACC (Activity) - Activity 0   Pain Rating: FLACC (Activity) - Cry 1   Pain Rating: FLACC (Activity) - Consolability 0   Score: FLACC (Activity) 2   ADL   Grooming Assistance 5  Supervision/Setup   UB Bathing Assistance 5  Supervision/Setup   LB Bathing Assistance 2  Maximal Assistance   UB Dressing Assistance 3  Moderate Assistance   LB Dressing Assistance 2  Maximal Assistance   Toileting Assistance  1  Total Assistance  (incontenent of urine)   Functional Standing Tolerance   Time poor plus balance using rw 40 seconds during zuleika care     Transfers   Sit to Stand 3  Moderate assistance   Additional items Assist x 2   Stand to Sit 3  Moderate assistance   Additional items Assist x 2   Cognition   Overall Cognitive Status Impaired   Comments mod vc's for sequencing adls   Activity Tolerance   Activity Tolerance Patient tolerated treatment well   Assessment   Assessment pt participated in am ot session and was seen focusing on ub and lb am care, dressing, grooming after set up while seated as well as sit to stand transfers  pt tolerated session well needing mod vc's to sequence routine tasks  pt denied pain except when standing on l le  pt tolerated standing for approx 40 seconds with poor plus balance using rw, pt required min asst ub and max asst lb, total asst for toileting needs and asst x 2 to stand for zuleika care  pt was cooperative and motivated this session   pt is limited by pain in stance   Plan   Treatment Interventions ADL retraining;Functional transfer training; Endurance training;Cognitive reorientation;Equipment evaluation/education; Activityengagement; Energy conservation; Compensatory technique education   Goal Expiration Date 06/12/18   OT Frequency 3-5x/wk   Recommendation   OT Discharge Recommendation Short Term Rehab   Barthel Index   Feeding 5   Bathing 0   Grooming Score 0   Dressing Score 0   Bladder Score 5   Bowels Score 5   Toilet Use Score 5   Transfers (Bed/Chair) Score 5   Mobility (Level Surface) Score 0   Stairs Score 0   Barthel Index Score 25   Modified [place] Scale   Modified [place] Scale 4   April A TAM Michelle

## 2018-05-31 NOTE — PLAN OF CARE
Problem: OCCUPATIONAL THERAPY ADULT  Goal: Performs self-care activities at highest level of function for planned discharge setting  See evaluation for individualized goals  Treatment Interventions: ADL retraining, Functional transfer training, Endurance training, Cognitive reorientation, Patient/family training, Equipment evaluation/education, Compensatory technique education, Energy conservation, Activityengagement          See flowsheet documentation for full assessment, interventions and recommendations  Outcome: Progressing  Limitation: Decreased ADL status, Decreased Safe judgement during ADL, Decreased cognition, Decreased endurance, Decreased self-care trans, Decreased high-level ADLs, Mood limitation  Prognosis: Fair  Assessment: pt participated in am ot session and was seen focusing on ub and lb am care, dressing, grooming after set up while seated as well as sit to stand transfers  pt tolerated session well needing mod vc's to sequence routine tasks  pt denied pain except when standing on l le  pt tolerated standing for approx 40 seconds with poor plus balance using rw, pt required min asst ub and max asst lb, total asst for toileting needs and asst x 2 to stand for zuleika care  pt was cooperative and motivated this session   pt is limited by pain in stance     OT Discharge Recommendation: Short Term Rehab  OT - OK to Discharge: Yes (TO INPT REHAB WHEN MEDICALLY CLEARED  )  Ekaterina Mendoza

## 2018-05-31 NOTE — PLAN OF CARE
Problem: PHYSICAL THERAPY ADULT  Goal: Performs mobility at highest level of function for planned discharge setting  See evaluation for individualized goals  Treatment/Interventions: Functional transfer training, LE strengthening/ROM, Therapeutic exercise, Endurance training, Cognitive reorientation, Patient/family training, Equipment eval/education, Bed mobility, Compensatory technique education, Spoke to nursing, Spoke to case management, OT  Equipment Recommended: Conrad Letters, Wheelchair       See flowsheet documentation for full assessment, interventions and recommendations  Outcome: Progressing  Prognosis: Fair  Problem List: Decreased strength, Decreased range of motion, Decreased endurance, Impaired balance, Decreased mobility, Decreased coordination, Decreased cognition, Impaired judgement, Decreased safety awareness, Pain  Assessment: Pt presented to therapy supine in bed with nurse preparing to change dressings & begin new IV due to pt removing existing ones  Pt performed rolling R with max A & held R sidelying during wound dressing  Pt occaionally complained of pain, but was able to maintain position  Pt then performed supine to sit transfer with considerable assist for trunk control and LE management, but pt able to follow simple commands to assist transfer  Pt able to maintain seated position today without assist during exercises and did not complain of pain while seated  Pt then performed sit to stand and stand pivot transfer with max A x 2 & RW  Demonstrated difficulty weight shifting and advancing LEs during transfer due to increased pain with weight bearing  Pt able to perform transfer safely by following simple commands  Pt performed therapeutic exercises while supine and seated, requiring considerable verbal commands and tactile cues to perform correctly and remain on task  Session ended with pt seated in chair with chair alarm on, nursing alerted to condition    Barriers to Discharge: Decreased caregiver support  Barriers to Discharge Comments: questional level of assist prior at [facility]? Recommendation: Post acute IP rehab     PT - OK to Discharge:  (to rehab when medically cleared)    See flowsheet documentation for full assessment

## 2018-05-31 NOTE — PROGRESS NOTES
Progress Note - Inpatient Pain Management    Agatha Solders 80 y o  female MRN: [id number]  Unit/Bed#: [facility] 930-01 Encounter: [id number]      Assessment:   Principal Problem:    Displaced intertrochanteric fracture of left femur, initial encounter for closed fracture [facility])  Active Problems:    Subdural hemorrhage    Alzheimer disease    CHF (congestive heart failure)    HTN (hypertension)    Atrial fibrillation    Stage 4 chronic kidney disease (HCC)    SAH (subarachnoid hemorrhage)    Hypernatremia      Plan/Recommendations:   Acute post-op pain  · Tylenol 975mg Q8 hours   · Continue Oxycodone 2 5mg Q4 hours PRN breakthrough pain; patient's mentation and sedation improved on lower dose  · Aqua-K pad to lower back PRN comfort  · Ok to use Lidocaine patches for localized pain control if needed, do not place near or on surgical site  · No NSAIDS or muscle relaxants due to age and impaired renal function  · Consider starting low dose Gabapentin 100mg at HS if pain becomes more severe  CrCl 22 2     Would not add additional medications at this time as pain is controlled and drowsiness/mentation has improved      Pain controlled on current regimen  Will sign off  Please call with any questions or changes in patients condition  Reviewed with SLIM    Pain History  Current pain location(s): left hip  Pain Scale:   Denied pain at present  Severity:  n/a  24 hour history: Patient is sitting in chair  Awake, alert and talkative  Denied pain when asked  Stated that she has minimal pain with movement to the chair       Current Analgesic regimen:  Tylenol 975mg Q8 hours, Oxycodone 2 5mg Q4 hours PRN (one dose)  Bowel Regimen: Senna daily, colace PRN     Meds/Allergies   all current active meds have been reviewed and current meds:   Current Facility-Administered Medications   Medication Dose Route Frequency    acetaminophen (TYLENOL) tablet 975 mg  975 mg Oral Q8H [address]    allopurinol (ZYLOPRIM) tablet 100 mg  100 mg Oral Daily    aluminum-magnesium hydroxide-simethicone (MYLANTA) 200-200-20 mg/5 mL oral suspension 30 mL  30 mL Oral Q6H PRN    atenolol (TENORMIN) tablet 50 mg  50 mg Oral Daily    budesonide-formoterol (SYMBICORT) 160-4 5 mcg/act inhaler 2 puff  2 puff Inhalation BID    calcitriol (ROCALTROL) capsule 0 25 mcg  0 25 mcg Oral Daily    cholecalciferol (VITAMIN D3) tablet 2,000 Units  2,000 Units Oral Daily    dextrose 5 % infusion  75 mL/hr Intravenous Continuous    diphenoxylate-atropine (LOMOTIL) 2 5-0 025 mg per tablet 1 tablet  1 tablet Oral Q6H PRN    docusate sodium (COLACE) capsule 100 mg  100 mg Oral BID PRN    enoxaparin (LOVENOX) subcutaneous injection 30 mg  30 mg Subcutaneous Q24H    melatonin tablet 3 mg  3 mg Oral HS    nystatin (MYCOSTATIN) powder 1 application  1 application Topical BID    ondansetron (ZOFRAN) injection 4 mg  4 mg Intravenous Q6H PRN    oxyCODONE (ROXICODONE) IR tablet 2 5 mg  2 5 mg Oral Q4H PRN    pantoprazole (PROTONIX) EC tablet 40 mg  40 mg Oral Early Morning    potassium chloride (K-DUR,KLOR-CON) CR tablet 10 mEq  10 mEq Oral Every Other Day    QUEtiapine (SEROquel) tablet 12 5 mg  12 5 mg Oral HS PRN    senna (SENOKOT) tablet 17 2 mg  2 tablet Oral Daily    silver sulfadiazine (SILVADENE,SSD) 1 % cream 1 application  1 application Topical Daily PRN       Allergies   Allergen Reactions    Penicillins Other (See Comments)     Unknown reaction; however, tolerated cefazolin    Rivastigmine        Objective     Temp:  [98 1 °F (36 7 °C)-98 2 °F (36 8 °C)] 98 2 °F (36 8 °C)  HR:  [82-91] 82  Resp:  [16-18] 17  BP: (117-166)/(69-94) 160/93      Intake/Output Summary (Last 24 hours) at 05/31/18 1104  Last data filed at 05/31/18 0929   Gross per 24 hour   Intake              500 ml   Output             1177 ml   Net             -677 ml                 Physical Exam: /93 (BP Location: Left arm)   Pulse 82   Temp 98 2 °F (36 8 °C) (Oral)   Resp 17   Ht 5' 2" (1 575 m)   Wt 86 8 kg (191 lb 5 8 oz)   SpO2 99%   BMI 35 00 kg/m²     General Appearance:    Alert, cooperative, no distress   Neurological:   Awake, confused at times   Head:    Normocephalic, without obvious abnormality, atraumatic   Eyes:    EOM's intact   Lungs:     Respirations unlabored   Chest Wall:    No deformity   Abdomen:        Round   Skin:   Skin color pale, no rashes or lesions     Lab Results:   Results from last 7 days  Lab Units 05/31/18  0453   WBC Thousand/uL 13 25*   HEMOGLOBIN g/dL 7 8*   HEMATOCRIT % 23 8*   PLATELETS Thousands/uL 233      Results from last 7 days  Lab Units 05/31/18  0453  05/29/18  1353   SODIUM mmol/L 144  < > 145   POTASSIUM mmol/L 4 1  < > 4 3   CHLORIDE mmol/L 110*  < > 112*   CO2 mmol/L 28  < > 26   BUN mg/dL 39*  < > 50*   CREATININE mg/dL 1 62*  < > 2 43*   CALCIUM mg/dL 8 3  < > 8 3   TOTAL PROTEIN g/dL  --   --  6 4   BILIRUBIN TOTAL mg/dL  --   --  0 58   ALK PHOS U/L  --   --  79   ALT U/L  --   --  14   AST U/L  --   --  16   GLUCOSE RANDOM mg/dL 101  < > 162*   < > = values in this interval not displayed  Imaging Studies: No new imaging reports  Counseling / Coordination of Care  Total floor / unit time spent today 15 minutes  Greater than 50% of total time was spent with the patient and / or family counseling and / or coordination of care   A description of the counseling / coordination of care: Reviewed plan of care and medications with patient, RN staff and primary care team     Andrey Flynn MS, RN-BC  Acute Pain

## 2018-06-01 VITALS
HEART RATE: 80 BPM | BODY MASS INDEX: 35.21 KG/M2 | RESPIRATION RATE: 18 BRPM | TEMPERATURE: 97.9 F | DIASTOLIC BLOOD PRESSURE: 82 MMHG | HEIGHT: 62 IN | WEIGHT: 191.36 LBS | SYSTOLIC BLOOD PRESSURE: 131 MMHG | OXYGEN SATURATION: 95 %

## 2018-06-01 PROBLEM — I50.32 CHRONIC DIASTOLIC CONGESTIVE HEART FAILURE (HCC): Status: ACTIVE | Noted: 2018-01-12

## 2018-06-01 PROBLEM — R13.12 OROPHARYNGEAL DYSPHAGIA: Status: ACTIVE | Noted: 2018-06-01

## 2018-06-01 PROBLEM — E87.0 HYPERNATREMIA: Status: RESOLVED | Noted: 2018-05-28 | Resolved: 2018-06-01

## 2018-06-01 PROBLEM — D62 ACUTE BLOOD LOSS ANEMIA: Status: ACTIVE | Noted: 2018-06-01

## 2018-06-01 LAB
ANION GAP SERPL CALCULATED.3IONS-SCNC: 4 MMOL/L (ref 4–13)
BUN SERPL-MCNC: 31 MG/DL (ref 5–25)
CALCIUM SERPL-MCNC: 8.2 MG/DL (ref 8.3–10.1)
CHLORIDE SERPL-SCNC: 110 MMOL/L (ref 100–108)
CO2 SERPL-SCNC: 30 MMOL/L (ref 21–32)
CREAT SERPL-MCNC: 1.46 MG/DL (ref 0.6–1.3)
ERYTHROCYTE [DISTWIDTH] IN BLOOD BY AUTOMATED COUNT: 15.3 % (ref 11.6–15.1)
GFR SERPL CREATININE-BSD FRML MDRD: 32 ML/MIN/1.73SQ M
GLUCOSE SERPL-MCNC: 76 MG/DL (ref 65–140)
HCT VFR BLD AUTO: 25.7 % (ref 34.8–46.1)
HGB BLD-MCNC: 8 G/DL (ref 11.5–15.4)
MCH RBC QN AUTO: 30.2 PG (ref 26.8–34.3)
MCHC RBC AUTO-ENTMCNC: 31.1 G/DL (ref 31.4–37.4)
MCV RBC AUTO: 97 FL (ref 82–98)
PLATELET # BLD AUTO: 255 THOUSANDS/UL (ref 149–390)
PMV BLD AUTO: 9.9 FL (ref 8.9–12.7)
POTASSIUM SERPL-SCNC: 4.2 MMOL/L (ref 3.5–5.3)
RBC # BLD AUTO: 2.65 MILLION/UL (ref 3.81–5.12)
SODIUM SERPL-SCNC: 144 MMOL/L (ref 136–145)
WBC # BLD AUTO: 12.57 THOUSAND/UL (ref 4.31–10.16)

## 2018-06-01 PROCEDURE — 99024 POSTOP FOLLOW-UP VISIT: CPT | Performed by: PHYSICIAN ASSISTANT

## 2018-06-01 PROCEDURE — 99239 HOSP IP/OBS DSCHRG MGMT >30: CPT | Performed by: INTERNAL MEDICINE

## 2018-06-01 PROCEDURE — 80048 BASIC METABOLIC PNL TOTAL CA: CPT | Performed by: INTERNAL MEDICINE

## 2018-06-01 PROCEDURE — 85027 COMPLETE CBC AUTOMATED: CPT | Performed by: INTERNAL MEDICINE

## 2018-06-01 RX ORDER — FERROUS SULFATE TAB EC 324 MG (65 MG FE EQUIVALENT) 324 (65 FE) MG
324 TABLET DELAYED RESPONSE ORAL
Qty: 60 TABLET | Refills: 0 | Status: SHIPPED | OUTPATIENT
Start: 2018-06-01

## 2018-06-01 RX ORDER — QUETIAPINE FUMARATE 25 MG/1
12.5 TABLET, FILM COATED ORAL
Qty: 15 TABLET | Refills: 0 | Status: SHIPPED | OUTPATIENT
Start: 2018-06-01

## 2018-06-01 RX ORDER — ATENOLOL 50 MG/1
50 TABLET ORAL DAILY
Qty: 30 TABLET | Refills: 0 | Status: SHIPPED | OUTPATIENT
Start: 2018-06-02

## 2018-06-01 RX ORDER — SENNOSIDES 8.6 MG
2 TABLET ORAL DAILY
Qty: 120 EACH | Refills: 0 | Status: SHIPPED | OUTPATIENT
Start: 2018-06-02

## 2018-06-01 RX ORDER — ACETAMINOPHEN 500 MG
1000 TABLET ORAL EVERY 8 HOURS
Qty: 30 TABLET | Refills: 0 | Status: SHIPPED | OUTPATIENT
Start: 2018-06-01 | End: 2018-06-11

## 2018-06-01 RX ORDER — FUROSEMIDE 40 MG/1
40 TABLET ORAL DAILY
Qty: 30 TABLET | Refills: 0 | Status: SHIPPED | OUTPATIENT
Start: 2018-06-01

## 2018-06-01 RX ORDER — MAGNESIUM HYDROXIDE/ALUMINUM HYDROXICE/SIMETHICONE 120; 1200; 1200 MG/30ML; MG/30ML; MG/30ML
15 SUSPENSION ORAL EVERY 6 HOURS PRN
Qty: 355 ML | Refills: 0 | Status: SHIPPED | OUTPATIENT
Start: 2018-06-01

## 2018-06-01 RX ORDER — PANTOPRAZOLE SODIUM 40 MG/1
40 TABLET, DELAYED RELEASE ORAL
Qty: 30 TABLET | Refills: 0 | Status: SHIPPED | OUTPATIENT
Start: 2018-06-02

## 2018-06-01 RX ORDER — OXYCODONE HYDROCHLORIDE 5 MG/1
2.5 TABLET ORAL EVERY 4 HOURS PRN
Qty: 30 TABLET | Refills: 0 | Status: SHIPPED | OUTPATIENT
Start: 2018-06-01 | End: 2018-06-11

## 2018-06-01 RX ADMIN — VITAMIN D, TAB 1000IU (100/BT) 2000 UNITS: 25 TAB at 09:21

## 2018-06-01 RX ADMIN — ATENOLOL 50 MG: 50 TABLET ORAL at 09:20

## 2018-06-01 RX ADMIN — ALLOPURINOL 100 MG: 100 TABLET ORAL at 09:22

## 2018-06-01 RX ADMIN — BUDESONIDE AND FORMOTEROL FUMARATE DIHYDRATE 2 PUFF: 160; 4.5 AEROSOL RESPIRATORY (INHALATION) at 09:21

## 2018-06-01 RX ADMIN — CALCITRIOL 0.25 MCG: 0.25 CAPSULE, LIQUID FILLED ORAL at 09:20

## 2018-06-01 RX ADMIN — SENNOSIDES 17.2 MG: 8.6 TABLET, FILM COATED ORAL at 09:21

## 2018-06-01 RX ADMIN — NYSTATIN 1 APPLICATION: 100000 POWDER TOPICAL at 09:21

## 2018-06-01 RX ADMIN — QUETIAPINE FUMARATE 12.5 MG: 25 TABLET ORAL at 01:36

## 2018-06-01 RX ADMIN — POTASSIUM CHLORIDE 10 MEQ: 750 TABLET, EXTENDED RELEASE ORAL at 09:21

## 2018-06-01 RX ADMIN — OXYCODONE HYDROCHLORIDE 2.5 MG: 5 TABLET ORAL at 01:36

## 2018-06-01 RX ADMIN — PANTOPRAZOLE SODIUM 40 MG: 40 TABLET, DELAYED RELEASE ORAL at 06:04

## 2018-06-01 RX ADMIN — ACETAMINOPHEN 975 MG: 325 TABLET ORAL at 06:04

## 2018-06-01 NOTE — NURSING NOTE
Pt d/c to [place] acute rehab, given report to Loli at facility/ no c/o pain and VS stable   Pt will be  at 10 30 am by transport team  Pt is stable at this time

## 2018-06-01 NOTE — DISCHARGE SUMMARY
Transition of Care Discharge Summary - [facility] Internal Medicine    Patient Information: Faye Rossi 80 y o  female MRN: [id number]  [facility] Encounter: [id number]    Discharging Physician / Practitioner: Angel Munoz DO  PCP: No primary care provider on file  Admission Date: 5/27/2018  Discharge Date: 06/01/18    Disposition:      Acute Rehab Facility at [address] [place] [address]  Reason for Admission: Unwitnessed fall with intertrochanteric fracture of left femur  Discharge Diagnoses:     Principal Problem:    Displaced intertrochanteric fracture of left femur, initial encounter for closed fracture [facility])  Active Problems:    Alzheimer disease    Chronic diastolic congestive heart failure (HCC)    HTN (hypertension)    Atrial fibrillation    Stage 4 chronic kidney disease (HCC)    Acute blood loss anemia    Oropharyngeal dysphagia  Resolved Problems:    Hypernatremia      Consultations During Hospital Stay:  · Orthopedics - Dr Prerna Franks  · Cardiology - Dr Mónica Crawford  · Endocrinology - Dr Iam Kruse  · Acute Pain Service    Procedures Performed:     Cephalomedullary Nail Left Proximal Femur for Intertrochanteric Femur Fracture by Dr Prerna Franks on 5/28/2018  Medication Adjustments and Discharge Medications:  · Summary of Medication Adjustments made as a result of this hospitalization: see discharge medication list   · Medication Dosing Tapers - Please refer to Discharge Medication List for details on any medication dosing tapers (if applicable to patient)  · Medications being temporarily held (include recommended restart time): None  · Discharge Medication List: See after visit summary for reconciled discharge medications  Wound Care Recommendations:  When applicable, please see wound care section of After Visit Summary      Diet Recommendations at Discharge:  Diet -        Diet Orders            Start     Ordered    05/30/18 4082  Dietary nutrition supplements  Once Question Answer Comment   Select Supplement: Indu Saldana Lunch, Dinner        05/30/18 1712    05/29/18 1048  Diet Dysphagia/Modified Consistency; Dysphagia 1-Pureed; Honey Thick Liquid  Diet effective now     Question Answer Comment   Diet Type Dysphagia/Modified Consistency    Dysphagia/Modified Consistency Dysphagia 1-Pureed    Liquid Modifier Honey Thick Liquid    RD to adjust diet per protocol? Yes        05/29/18 1048        Fluid Restriction - No Fluid Restriction at Discharge  Instructions for any Catheters / Lines Present at Discharge (including removal date, if applicable): None    Significant Findings / Test Results:     · Left hip/pelvis x-ray 05/27/2018 - intertrochanteric fracture of left femur  · Chest x-ray 05/27/2018 - diminished inspiration  Mild vascular congestion  · CT of the left hip on 05/27/2018 - comminuted intertrochanteric fracture of the left proximal femur  · X-ray of the left knee on 05/28/2018 - moderate tricompartmental degenerative arthritis  No acute osseous abnormality  · Creatinine on admission was 2 02  The BUN at that time was 44 in the sodium was 147  On 05/30/2018 the creatinine went as high as 2 19 with a BUN of 53 and persistent sodium of 147  After IV fluids, sodium has improved and remained stable now to 144, BUN is 31 and creatinine is 1 46 on the day of discharge  · Hemoglobin 9 4 with hematocrit 29 3 on admission  Postoperatively the hemoglobin was 6 8 with hematocrit 20 2  Patient was given a transfusion and that brought the hemoglobin up to 7 4 with hematocrit 23 4  The patient's hemoglobin has progressively increased over the last couple of days and on the day of discharge the hemoglobin is 8 0 with hematocrit 25 7  · White blood cell count range is between 10 03 and 14 58    On the day of discharge it is 12 57   · 25-Hydroxy Vitamin D level is 39 0, PTH is 175 8, calcium is 8 2 which will correct into a normal range when accounting for a albumin level that ranges between 2 8 and 3 1   · TSH is 1 28   · Hemoglobin A1c is 5 4%  · ESR is 92    Incidental Findings:   · None     Test Results Pending at Discharge (will require follow up): · None     Outpatient Tests Requested:  · CBC and BMP in 5 days - copy to physician at rehab facility to evaluate  Complications:  Acute Blood Loss Anemia    Hospital Course:     Samantha Perdue is a 80 y o  female patient who originally presented to the hospital from [address] on 5/27/2018 due to an unwitnessed fall whereupon she developed a fracture of the left hip at in the intertrochanteric location  The patient was admitted to the hospital on the medicine service for operative management of this fracture  The patient went for surgical repair (see above for details) on 5/28/2018  She would require transfusion of one unit of PRBC the following day for anemia which was present  Following transfusion the hemoglobin rickey only from 6 8 to 7 4 and we decided to monitor the patient for additional period of time to make sure that the hemoglobin would remain stable  The hemoglobin did come up to 7 8 on 05/31/2018 and she was given Venofer on 05/31 as well as 06/01/2018  The hemoglobin on the day of discharge is 8 0  We are recommending additional oral iron supplementation in the outpatient setting to continue to improve the patient's blood counts  However, at this point, we do not feel there is any significant bleeding present  The patient does have a history of atrial fibrillation but it was previously determined that due to history of falls and prior intracranial hemorrhage that she is at too high of a risk to take anticoagulation  Therefore, we feel that any blood loss that occurred has now stabilized  We will recommend though that the patient have a CBC checked in the next 5 days to assure that the counts are improving      Postoperatively the patient was on 5 mg doses of oxycodone for pain control  However, with that dose we noted that the patient was at times more confused and agitated  The patient would require restraints only on a single night during the hospitalization but now has been off of the restraints consistently over the last few days  A strategy to try to reduce the risk of confusion with medication was to use Tylenol 975 mg every 8 hr as well as reduction in the oxycodone dose to 2 5 mg every 4 hr as needed for pain  The patient seems to be doing well with this regimen  Of note, the family states that as a result of the patient's Alzheimer dementia that she many times in hospital settings as well as at home does experience sundowning behaviors  Most of the time she is able to be redirected  The patient did exhibit some oropharyngeal dysphagia during this hospitalization  The patient was followed by speech pathology and had been recommended to use a pureed diet with honey thick liquids  This was a restricted diet compared to the patient's usual home diet which was ground meats but otherwise normal food and liquid consistency  On the day of discharge, speech pathology had informed me that they would be in touch with the speech therapists at [facility] to arrange for additional evaluations of swallowing to make sure that patient is re-assessed in this capacity for potential diet upgrades  The patient had acute kidney injury on admission as well as hypernatremia  The patient was normally on Lasix 60 mg twice daily but this was held during the hospitalization  The patient was maintained on IV fluids up until 05/31/2018 at which point they were stopped and the patient was monitored for an additional day off of IV fluids with encouragement of p o  intake  The patient's sodium and kidney numbers all remained stable  Of note, the patient does have chronic kidney disease and typically her creatinine is between 2 and 2 5    The patient's felt to be at or better than baseline currently and we will restart the patient's Lasix at a lower than home dose, using just 40 mg daily  We will ask that the patient's weight is be checked daily and if the patient has a weight gain of more than 3 lb in a day or 5 lb in a week that her cardiologist be contacted for recommendations on how to adjust the diuretics  We are recommending also that the patient have a BMP done in the next 5 days to recheck these kidney numbers for stability  Condition at Discharge: stable     Discharge Day Visit / Exam:     Subjective: The patient had no major events overnight  Ate full breakfast this morning  She reports pain to be controlled currently  Vitals: Blood Pressure: 131/82 (06/01/18 0748)  Pulse: 80 (06/01/18 0748)  Temperature: 97 9 °F (36 6 °C) (06/01/18 0748)  Temp Source: Oral (06/01/18 0748)  Respirations: 18 (06/01/18 0748)  Height: 5' 2" (157 5 cm) (05/30/18 0100)  Weight - Scale: 86 8 kg (191 lb 5 8 oz) (05/30/18 0534)  SpO2: 95 % (06/01/18 0748)  Exam:   Physical Exam   Constitutional: No distress  HENT:   Mouth/Throat: Oropharynx is clear and moist  No oropharyngeal exudate  Eyes: Conjunctivae are normal  Pupils are equal, round, and reactive to light  Neck: Neck supple  No JVD present  Cardiovascular: Normal rate and regular rhythm  No murmur heard  Pulmonary/Chest: Effort normal and breath sounds normal  She has no wheezes  She has no rales  Abdominal: Soft  Bowel sounds are normal  She exhibits no distension  There is no tenderness  Musculoskeletal: She exhibits tenderness (surgical site exhibits mild tenderness  )  Improving bruise at surgery site  The incision is clean and intact and there is no bleeding or drainage  No significant edema in the extremities  Neurological: She is alert  No cranial nerve deficit  Not oriented to time or place  Motor function is 5/5 to all extremities  Skin: Skin is warm and dry  No rash noted     Psychiatric: She has a normal mood and affect  Vitals reviewed  Discussion with Family: Daughter Rola has been updated daily  She is aware of discharge plan today  Goals of Care Discussions:  · Code Status at Discharge: Level 1 - Full Code  · Were there any Goals of Care Discussions during Hospitalization?: No, except code status  · Results of any General Goals of Care Discussions: N/A  · POLST Completed: No   · If POLST Completed, Summary of POLST Agreement Provided Here: N/A  · OK to Rehospitalize if Needed? Yes    Discharge instructions/Information to patient and family:   See after visit summary section titled Discharge Instructions for information provided to patient and family  Planned Readmission: None      Discharge Statement:  I spent 38 minutes discharging the patient  This time was spent on the day of discharge  I had direct contact with the patient on the day of discharge  Greater than 50% of the total time was spent examining patient, answering all patient questions, arranging and discussing plan of care with patient as well as directly providing post-discharge instructions  Additional time then spent on discharge activities      ** Please Note: This note has been constructed using a voice recognition system **

## 2018-06-01 NOTE — SOCIAL WORK
Cm confirmed with MD, that patient remains cleared for discharge today  Cm contacted patient's daughter and confirmed with her discharge location and time  Patient's daughter remains in agreement with discharge plan  Cm informed facility that patient will also be ready for discharge today  All parties remain in agreement

## 2018-06-01 NOTE — DISCHARGE INSTR - DIET
Dysphagia 1 puree w/ honey thick liquids  f/u w/ ST at rehab for upgrade  Pt was lethargic, reduced cooperation for eval  Now more alert

## 2018-06-01 NOTE — PROGRESS NOTES
Orthopedics   [facility] 80 y o  female MRN: [id number]  Unit/Bed#:       Subjective:  80 y  o female post operative day 4 left TFN  Patient denies any complaints currently  She does not have any pain today  Labs:    0  Lab Value Date/Time   HCT 25 7 (L) 06/01/2018 0459   HCT 23 8 (L) 05/31/2018 0453   HCT 26 2 (L) 05/30/2018 1317   HGB 8 0 (L) 06/01/2018 0459   HGB 7 8 (L) 05/31/2018 0453   HGB 8 4 (L) 05/30/2018 1317   INR 1 06 05/27/2018 0730   WBC 12 57 (H) 06/01/2018 0459   WBC 13 25 (H) 05/31/2018 0453   WBC 14 58 (H) 05/30/2018 0458   ESR 92 (H) 05/29/2018 1353       Meds:  No current facility-administered medications for this encounter       Current Outpatient Prescriptions:     acetaminophen (TYLENOL) 500 mg tablet, Take 2 tablets (1,000 mg total) by mouth every 8 (eight) hours for 10 days, Disp: 30 tablet, Rfl: 0    allopurinol (ZYLOPRIM) 100 mg tablet, Take 100 mg by mouth daily, Disp: , Rfl:     aluminum-magnesium hydroxide-simethicone (MYLANTA) 200-200-20 mg/5 mL suspension, Take 15 mL by mouth every 6 (six) hours as needed for indigestion or heartburn, Disp: 355 mL, Rfl: 0    [START ON 6/2/2018] atenolol (TENORMIN) 50 mg tablet, Take 1 tablet (50 mg total) by mouth daily, Disp: 30 tablet, Rfl: 0    budesonide-formoterol (SYMBICORT) 160-4 5 mcg/act inhaler, Inhale 2 puffs 2 (two) times a day, Disp: , Rfl:     cholecalciferol (VITAMIN D3) 1,000 units tablet, Take 2,000 Units by mouth daily, Disp: , Rfl:     docusate sodium (COLACE) 100 mg capsule, Take 100 mg by mouth 2 (two) times a day as needed for constipation, Disp: , Rfl:     enoxaparin (LOVENOX) 30 mg/0 3 mL, Inject 0 3 mL (30 mg total) under the skin every 24 hours, Disp: 28 Syringe, Rfl: 0    ferrous sulfate 324 (65 Fe) mg, Take 1 tablet (324 mg total) by mouth 2 (two) times a day before meals, Disp: 60 tablet, Rfl: 0    furosemide (LASIX) 40 mg tablet, Take 1 tablet (40 mg total) by mouth daily, Disp: 30 tablet, Rfl: 0   ipratropium-albuterol (DUO-NEB) 0 5-2 5 mg/3 mL, Take 3 mL by nebulization 4 (four) times a day, Disp: , Rfl:     Magnesium 65 MG TABS, Take 65 mg by mouth daily, Disp: , Rfl:     Melatonin-Pyridoxine (EQL MELATONIN/VITAMIN B-6) 5-1 MG TABS, Take 1 tablet by mouth daily at bedtime, Disp: , Rfl:     nystatin (MYCOSTATIN) powder, Apply 1 application topically 2 (two) times a day as needed, Disp: , Rfl:     oxyCODONE (ROXICODONE) 5 mg immediate release tablet, Take 0 5 tablets (2 5 mg total) by mouth every 4 (four) hours as needed for severe pain for up to 10 days Max Daily Amount: 15 mg, Disp: 30 tablet, Rfl: 0    [START ON 6/2/2018] pantoprazole (PROTONIX) 40 mg tablet, Take 1 tablet (40 mg total) by mouth daily in the early morning, Disp: 30 tablet, Rfl: 0    potassium chloride (K-DUR,KLOR-CON) 10 mEq tablet, Take 10 mEq by mouth every other day  , Disp: , Rfl:     QUEtiapine (SEROquel) 25 mg tablet, Take 0 5 tablets (12 5 mg total) by mouth daily at bedtime as needed (agitation / sundowning ), Disp: 15 tablet, Rfl: 0    [START ON 6/2/2018] senna (SENOKOT) 8 6 mg, Take 2 tablets (17 2 mg total) by mouth daily, Disp: 120 each, Rfl: 0    silver sulfadiazine (SILVADENE,SSD) 1 % cream, Apply 1 application topically daily as needed for wound care, Disp: , Rfl:     Blood Culture:   No results found for: BLOODCX    Wound Culture:   Lab Results   Component Value Date    WOUNDCULT 2+ Growth of Staphylococcus aureus (A) 02/15/2018       Ins and Outs:  I/O last 24 hours:   In: 538 [P O :538]  Out: 8985 [Urine:1579]          Physical Exam:  Vitals:    06/01/18 0748   BP: 131/82   Pulse: 80   Resp: 18   Temp: 97 9 °F (36 6 °C)   SpO2: 95%     left Lower Extremity extremity:  · Dressings are saturated with serosanguinous drainage and she has evidence of picking out her staples from these wounds  · No erythema around wounds, however, they are not completely closed at this time  · Positive ankle plantarflexion/dorsiflexion, EHL/FHL  · Sensation intact distally    _*_*_*_*_*_*_*_*_*_*_*_*_*_*_*_*_*_*_*_*_*_*_*_*_*_*_*_*_*_*_*_*_*_*_*_*_*_*_*_*_*    Assessment: 80 y  o female post operative day 4 left TFN    Doing well    Plan:  · Continue with WBAT  · DVT prophylaxis  · Analgesics  · PT/OT  · Dispo: [id number] Tiffanie Walker for discharge from ortho perspective  · Patient noted to have acute blood loss anemia due to a drop in Hbg of > 2 0g from preop levels, will monitor vital signs and resuscitate with IV fluids as needed    Wolf Ko PA-C

## 2018-07-11 ENCOUNTER — APPOINTMENT (EMERGENCY)
Dept: CT IMAGING | Facility: HOSPITAL | Age: 83
End: 2018-07-11
Payer: MEDICARE

## 2018-07-11 ENCOUNTER — APPOINTMENT (EMERGENCY)
Dept: RADIOLOGY | Facility: HOSPITAL | Age: 83
End: 2018-07-11
Payer: MEDICARE

## 2018-07-11 ENCOUNTER — HOSPITAL ENCOUNTER (EMERGENCY)
Facility: HOSPITAL | Age: 83
End: 2018-07-11
Attending: EMERGENCY MEDICINE
Payer: MEDICARE

## 2018-07-11 VITALS
SYSTOLIC BLOOD PRESSURE: 149 MMHG | OXYGEN SATURATION: 96 % | HEART RATE: 92 BPM | WEIGHT: 195.2 LBS | DIASTOLIC BLOOD PRESSURE: 65 MMHG | RESPIRATION RATE: 21 BRPM | TEMPERATURE: 97.6 F | BODY MASS INDEX: 35.7 KG/M2

## 2018-07-11 DIAGNOSIS — S79.912S HIP INJURY, LEFT, SEQUELA: ICD-10-CM
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
## 2018-07-11 LAB
ALBUMIN SERPL BCP-MCNC: 3.1 G/DL (ref 3.5–5)
ALP SERPL-CCNC: 157 U/L (ref 46–116)
ALT SERPL W P-5'-P-CCNC: 18 U/L (ref 12–78)
ANION GAP SERPL CALCULATED.3IONS-SCNC: 8 MMOL/L (ref 4–13)
APTT PPP: 30 SECONDS (ref 24–36)
AST SERPL W P-5'-P-CCNC: 34 U/L (ref 5–45)
BASOPHILS # BLD AUTO: 0.03 THOUSANDS/ΜL (ref 0–0.1)
BASOPHILS NFR BLD AUTO: 0 % (ref 0–1)
BILIRUB SERPL-MCNC: 0.5 MG/DL (ref 0.2–1)
BUN SERPL-MCNC: 29 MG/DL (ref 5–25)
CALCIUM SERPL-MCNC: 8.8 MG/DL (ref 8.3–10.1)
CHLORIDE SERPL-SCNC: 103 MMOL/L (ref 100–108)
CK SERPL-CCNC: 80 U/L (ref 26–192)
CO2 SERPL-SCNC: 32 MMOL/L (ref 21–32)
CREAT SERPL-MCNC: 1.53 MG/DL (ref 0.6–1.3)
EOSINOPHIL # BLD AUTO: 1.22 THOUSAND/ΜL (ref 0–0.61)
EOSINOPHIL NFR BLD AUTO: 13 % (ref 0–6)
ERYTHROCYTE [DISTWIDTH] IN BLOOD BY AUTOMATED COUNT: 15.6 % (ref 11.6–15.1)
GFR SERPL CREATININE-BSD FRML MDRD: 31 ML/MIN/1.73SQ M
GLUCOSE SERPL-MCNC: 102 MG/DL (ref 65–140)
GLUCOSE SERPL-MCNC: 107 MG/DL (ref 65–140)
HCT VFR BLD AUTO: 33.4 % (ref 34.8–46.1)
HGB BLD-MCNC: 10.5 G/DL (ref 11.5–15.4)
INR PPP: 1.08 (ref 0.86–1.17)
LYMPHOCYTES # BLD AUTO: 0.78 THOUSANDS/ΜL (ref 0.6–4.47)
LYMPHOCYTES NFR BLD AUTO: 9 % (ref 14–44)
MAGNESIUM SERPL-MCNC: 1.9 MG/DL (ref 1.6–2.6)
MCH RBC QN AUTO: 31.9 PG (ref 26.8–34.3)
MCHC RBC AUTO-ENTMCNC: 31.4 G/DL (ref 31.4–37.4)
MCV RBC AUTO: 102 FL (ref 82–98)
MONOCYTES # BLD AUTO: 0.78 THOUSAND/ΜL (ref 0.17–1.22)
MONOCYTES NFR BLD AUTO: 9 % (ref 4–12)
NEUTROPHILS # BLD AUTO: 6.38 THOUSANDS/ΜL (ref 1.85–7.62)
NEUTS SEG NFR BLD AUTO: 69 % (ref 43–75)
PLATELET # BLD AUTO: 322 THOUSANDS/UL (ref 149–390)
PMV BLD AUTO: 9.3 FL (ref 8.9–12.7)
POTASSIUM SERPL-SCNC: 4.2 MMOL/L (ref 3.5–5.3)
PROT SERPL-MCNC: 7.1 G/DL (ref 6.4–8.2)
PROTHROMBIN TIME: 13.5 SECONDS (ref 11.8–14.2)
RBC # BLD AUTO: 3.29 MILLION/UL (ref 3.81–5.12)
SODIUM SERPL-SCNC: 143 MMOL/L (ref 136–145)
WBC # BLD AUTO: 9.19 THOUSAND/UL (ref 4.31–10.16)

## 2018-07-11 PROCEDURE — 73090 X-RAY EXAM OF FOREARM: CPT

## 2018-07-11 PROCEDURE — 82550 ASSAY OF CK (CPK): CPT | Performed by: EMERGENCY MEDICINE

## 2018-07-11 PROCEDURE — 85730 THROMBOPLASTIN TIME PARTIAL: CPT | Performed by: EMERGENCY MEDICINE

## 2018-07-11 PROCEDURE — 93005 ELECTROCARDIOGRAM TRACING: CPT

## 2018-07-11 PROCEDURE — 85610 PROTHROMBIN TIME: CPT | Performed by: EMERGENCY MEDICINE

## 2018-07-11 PROCEDURE — 73521 X-RAY EXAM HIPS BI 2 VIEWS: CPT

## 2018-07-11 PROCEDURE — 70450 CT HEAD/BRAIN W/O DYE: CPT

## 2018-07-11 PROCEDURE — 72125 CT NECK SPINE W/O DYE: CPT

## 2018-07-11 PROCEDURE — 36415 COLL VENOUS BLD VENIPUNCTURE: CPT | Performed by: EMERGENCY MEDICINE

## 2018-07-11 PROCEDURE — 83735 ASSAY OF MAGNESIUM: CPT | Performed by: EMERGENCY MEDICINE

## 2018-07-11 PROCEDURE — 99285 EMERGENCY DEPT VISIT HI MDM: CPT

## 2018-07-11 PROCEDURE — 80053 COMPREHEN METABOLIC PANEL: CPT | Performed by: EMERGENCY MEDICINE

## 2018-07-11 PROCEDURE — 82948 REAGENT STRIP/BLOOD GLUCOSE: CPT

## 2018-07-11 PROCEDURE — 85025 COMPLETE CBC W/AUTO DIFF WBC: CPT | Performed by: EMERGENCY MEDICINE

## 2018-07-11 RX ORDER — ASPIRIN 325 MG
325 TABLET ORAL DAILY
COMMUNITY

## 2018-07-11 RX ORDER — OXYCODONE HYDROCHLORIDE 5 MG/1
5 TABLET ORAL EVERY 4 HOURS PRN
COMMUNITY
End: 2018-07-18 | Stop reason: HOSPADM

## 2018-07-11 RX ORDER — POLYETHYLENE GLYCOL 3350 17 G/17G
17 POWDER, FOR SOLUTION ORAL DAILY
COMMUNITY

## 2018-07-11 RX ORDER — ACETAMINOPHEN 500 MG
500 TABLET ORAL EVERY 6 HOURS PRN
COMMUNITY
End: 2018-07-18 | Stop reason: HOSPADM

## 2018-07-11 NOTE — ED PROVIDER NOTES
History  Chief Complaint   Patient presents with    Fall     Patient fell in bathroom, left hip pain, right forearm pain     Patient 80year old female with dementia, afib not on AC due to falls and past history of SDH, CHF, HTN, CKD from [place] coming in for evaluation after being found on the ground in her room  Patient was discharged from [facility] after she had similar fall in May 2018 and had intertrochanteric left femur fracture repaired by Intermedullary pin by Dr Mehnaz Teran         History provided by:  EMS personnel and patient  History limited by:  Dementia   used: No        Prior to Admission Medications   Prescriptions Last Dose Informant Patient Reported? Taking?    Budesonide (PULMICORT IN)   Yes Yes   Sig: Inhale   Magnesium 65 MG TABS   Yes No   Sig: Take 65 mg by mouth daily   Magnesium Oxide (MAG-OX PO)   Yes Yes   Sig: Take 400 mg by mouth   Melatonin-Pyridoxine (EQL MELATONIN/VITAMIN B-6) 5-1 MG TABS   Yes Yes   Sig: Take 1 tablet by mouth daily at bedtime   QUEtiapine (SEROquel) 25 mg tablet   No No   Sig: Take 0 5 tablets (12 5 mg total) by mouth daily at bedtime as needed (agitation / sundowning )   acetaminophen (TYLENOL) 500 mg tablet   Yes Yes   Sig: Take 500 mg by mouth every 6 (six) hours as needed for mild pain   allopurinol (ZYLOPRIM) 100 mg tablet   Yes Yes   Sig: Take 100 mg by mouth daily   aluminum-magnesium hydroxide-simethicone (MYLANTA) 200-200-20 mg/5 mL suspension   No No   Sig: Take 15 mL by mouth every 6 (six) hours as needed for indigestion or heartburn   aspirin 325 mg tablet   Yes Yes   Sig: Take 325 mg by mouth daily   atenolol (TENORMIN) 50 mg tablet   No Yes   Sig: Take 1 tablet (50 mg total) by mouth daily   budesonide-formoterol (SYMBICORT) 160-4 5 mcg/act inhaler   Yes No   Sig: Inhale 2 puffs 2 (two) times a day   cholecalciferol (VITAMIN D3) 1,000 units tablet   Yes Yes   Sig: Take 2,000 Units by mouth daily   docusate sodium (COLACE) 100 mg capsule   Yes No   Sig: Take 100 mg by mouth 2 (two) times a day as needed for constipation   enoxaparin (LOVENOX) 30 mg/0 3 mL   No No   Sig: Inject 0 3 mL (30 mg total) under the skin every 24 hours   ferrous sulfate 324 (65 Fe) mg   No Yes   Sig: Take 1 tablet (324 mg total) by mouth 2 (two) times a day before meals   furosemide (LASIX) 40 mg tablet   No Yes   Sig: Take 1 tablet (40 mg total) by mouth daily   ipratropium-albuterol (DUO-NEB) 0 5-2 5 mg/3 mL   Yes Yes   Sig: Take 3 mL by nebulization 4 (four) times a day   nystatin (MYCOSTATIN) powder   Yes Yes   Sig: Apply 1 application topically 2 (two) times a day as needed   oxyCODONE (ROXICODONE) 5 mg immediate release tablet   Yes Yes   Sig: Take 5 mg by mouth every 4 (four) hours as needed for moderate pain   pantoprazole (PROTONIX) 40 mg tablet   No Yes   Sig: Take 1 tablet (40 mg total) by mouth daily in the early morning   polyethylene glycol (MIRALAX) 17 g packet   Yes Yes   Sig: Take 17 g by mouth daily   potassium chloride (K-DUR,KLOR-CON) 10 mEq tablet   Yes Yes   Sig: Take 10 mEq by mouth every other day     senna (SENOKOT) 8 6 mg   No Yes   Sig: Take 2 tablets (17 2 mg total) by mouth daily   silver sulfadiazine (SILVADENE,SSD) 1 % cream   Yes No   Sig: Apply 1 application topically daily as needed for wound care      Facility-Administered Medications: None       Past Medical History:   Diagnosis Date    [facility]     Alzheimer's dementia     CHF (congestive heart failure) (HCC)     Gout     Hypertension     Renal disorder        Past Surgical History:   Procedure Laterality Date    IN OPEN RX FEMUR FX+INTRAMED DUSTIN Left 5/28/2018    Procedure: INSERTION NAIL IM FEMUR ANTEGRADE (TROCHANTERIC); Surgeon: Melany Lenz MD;  Location: [facility];  Service: Orthopedics       No family history on file  I have reviewed and agree with the history as documented      Social History   Substance Use Topics    Smoking status: Never Smoker    Smokeless tobacco: Never Used    Alcohol use No        Review of Systems   Unable to perform ROS: Dementia       Physical Exam  Physical Exam   Constitutional: She is oriented to person, place, and time  She appears well-developed and well-nourished  No distress  HENT:   Head: Normocephalic and atraumatic  Right Ear: Hearing, tympanic membrane and external ear normal    Left Ear: Hearing, tympanic membrane and external ear normal    Nose: Nose normal    Mouth/Throat: Uvula is midline, oropharynx is clear and moist and mucous membranes are normal    No septal hematoma bilaterally   Eyes: Conjunctivae, EOM and lids are normal  Pupils are equal, round, and reactive to light  Neck: Trachea normal, normal range of motion and phonation normal  Neck supple  No spinous process tenderness present  Normal range of motion present  Cardiovascular: Normal rate, regular rhythm, normal heart sounds and intact distal pulses  No murmur heard  Pulmonary/Chest: Effort normal and breath sounds normal  No stridor  No respiratory distress  Abdominal: Soft  Bowel sounds are normal  She exhibits no distension  There is no tenderness  Musculoskeletal: She exhibits no edema  Right hip: Normal         Left hip: She exhibits decreased range of motion, decreased strength, tenderness and bony tenderness  She exhibits no swelling and no crepitus  Right knee: Normal         Left knee: Normal         Right ankle: Normal         Left ankle: Normal         Thoracic back: Normal         Lumbar back: Normal         Legs:  Left hip - scar well healing  No open wounds  Neurological: She is alert and oriented to person, place, and time  No cranial nerve deficit  Skin: Skin is warm  Capillary refill takes less than 2 seconds  She is not diaphoretic  Nursing note and vitals reviewed        Vital Signs  ED Triage Vitals   Temperature Pulse Respirations Blood Pressure SpO2   07/11/18 1706 07/11/18 1706 07/11/18 1706 07/11/18 1706 07/11/18 1706   97 6 °F (36 4 °C) 88 18 123/99 94 %      Temp Source Heart Rate Source Patient Position - Orthostatic VS BP Location FiO2 (%)   07/11/18 1706 07/11/18 1706 07/11/18 1706 07/11/18 1937 --   Temporal Monitor Lying Left arm       Pain Score       --                  Vitals:    07/11/18 1937 07/11/18 1951 07/11/18 2051 07/11/18 2151   BP: 159/91 164/91 136/78 137/78   Pulse: 86 93 87 86   Patient Position - Orthostatic VS: Lying Sitting Sitting Sitting       Visual Acuity  Visual Acuity      Most Recent Value   L Pupil Size (mm)  3   R Pupil Size (mm)  3          ED Medications  Medications - No data to display    Diagnostic Studies  Results Reviewed     Procedure Component Value Units Date/Time    Fingerstick Glucose (POCT) [[id number]]  (Normal) Collected:  07/11/18 1748    Lab Status:  Final result Updated:  07/11/18 1752     POC Glucose 102 mg/dl     Protime-INR [[id number]]  (Normal) Collected:  07/11/18 1713    Lab Status:  Final result Specimen:  Blood from Arm, Right Updated:  07/11/18 1743     Protime 13 5 seconds      INR 1 08    APTT [[id number]]  (Normal) Collected:  07/11/18 1713    Lab Status:  Final result Specimen:  Blood from Arm, Right Updated:  07/11/18 1743     PTT 30 seconds     Comprehensive metabolic panel [[id number]]  (Abnormal) Collected:  07/11/18 1713    Lab Status:  Final result Specimen:  Blood from Arm, Right Updated:  07/11/18 1731     Sodium 143 mmol/L      Potassium 4 2 mmol/L      Chloride 103 mmol/L      CO2 32 mmol/L      Anion Gap 8 mmol/L      BUN 29 (H) mg/dL      Creatinine 1 53 (H) mg/dL      Glucose 107 mg/dL      Calcium 8 8 mg/dL      AST 34 U/L      ALT 18 U/L      Alkaline Phosphatase 157 (H) U/L      Total Protein 7 1 g/dL      Albumin 3 1 (L) g/dL      Total Bilirubin 0 50 mg/dL      eGFR 31 ml/min/1 73sq m     Narrative:         National Kidney Disease Education Program recommendations are as follows:  GFR calculation is accurate only with a steady state creatinine  Chronic Kidney disease less than 60 ml/min/1 73 sq  meters  Kidney failure less than 15 ml/min/1 73 sq  meters  Magnesium [[id number]]  (Normal) Collected:  07/11/18 1713    Lab Status:  Final result Specimen:  Blood from Arm, Right Updated:  07/11/18 1731     Magnesium 1 9 mg/dL     CK (with reflex to MB) [[id number]]  (Normal) Collected:  07/11/18 1713    Lab Status:  Final result Specimen:  Blood from Arm, Right Updated:  07/11/18 1731     Total CK 80 U/L     CBC and differential [[id number]]  (Abnormal) Collected:  07/11/18 1713    Lab Status:  Final result Specimen:  Blood from Arm, Right Updated:  07/11/18 1717     WBC 9 19 Thousand/uL      RBC 3 29 (L) Million/uL      Hemoglobin 10 5 (L) g/dL      Hematocrit 33 4 (L) %       (H) fL      MCH 31 9 pg      MCHC 31 4 g/dL      RDW 15 6 (H) %      MPV 9 3 fL      Platelets 176 Thousands/uL      Neutrophils Relative 69 %      Lymphocytes Relative 9 (L) %      Monocytes Relative 9 %      Eosinophils Relative 13 (H) %      Basophils Relative 0 %      Neutrophils Absolute 6 38 Thousands/µL      Lymphocytes Absolute 0 78 Thousands/µL      Monocytes Absolute 0 78 Thousand/µL      Eosinophils Absolute 1 22 (H) Thousand/µL      Basophils Absolute 0 03 Thousands/µL     UA w Reflex to Microscopic [[id number]]     Lab Status:  No result Specimen:  Urine                  CT spine cervical without contrast   Final Result by Aime Ge MD (07/11 1822)         1  No acute fracture or dislocation  2   Degenerative changes  Workstation performed: [id number]         XR forearm 2 views RIGHT   Final Result by Caty Oquendo DO (07/11 1804)      Question irregularity/lucency of the distal radial styloid  Correlate with any point tenderness  Otherwise, negative for forearm fracture          Workstation performed: [id number]         XR hips bilateral 2 vw w pelvis if performed   Final Result by Caty Oquendo DO (07/11 1753)      Left hip hardware failure with migration of the femoral neck nail through the superolateral femoral head cortex into the joint space  There is perhaps slightly more pronounced coxa varus deformity of the left hip compared to the postoperative film,    however no discernible new fracture  The study was marked in [facility] for immediate notification  Workstation performed: [id number]         CT head without contrast   Final Result by Sheree Suarez MD (07/11 1739)      No acute intracranial abnormality  Workstation performed: [id number]                    Procedures  Procedures       Phone Contacts  ED Phone Contact    ED Course                               MDM  Number of Diagnoses or Management Options  Fall, initial encounter:   Hip injury, left, sequela:   Diagnosis management comments: 5:32 PM  Labs stable including Cr and H/H  Pending xrays and UA  CK stable  Per [facility] records, patient was on the bathroom floor approx 20 minutes and at baseline  EKG INTERPRETATION @ 1748  RHYTHM:afib at 80 bpm  AXIS: normal  INTERVALS:     QRS COMPLEX: QRS at 92 ms  ST SEGMENT: artifact present  Non specific st segment changes  QT INTERVAL: QTC measured at 489 ms  COMPARED WITH PRIOR no change  Interpretation by Britni Jamison DO    5:59 PM  Noted changes on xray - migration of nail into joint space  Transfer order placed  6:12 PM  Pending call back from Ortho  Patient with no tenderness along right forearm or wrist  Full arom of bilateral shoulder, wrist, elbow and hand w/o pain  6:27 PM  Per ortho, Dr Benji Cummins "could come back tomorrow for eval"  Ortho is not here for another week in 's  Pending SLIM  From [facility] PM  SLIM through PACS Dr Mike Schwab  Portions of the record may have been created with voice recognition software  Occasional wrong word or "sound a like" substitutions may have occurred due to the inherent limitations of voice recognition software   Read the chart carefully and recognize, using context, where substitutions have occurred  Amount and/or Complexity of Data Reviewed  Clinical lab tests: ordered and reviewed  Tests in the radiology section of CPT®: ordered and reviewed  Tests in the medicine section of CPT®: ordered and reviewed  Independent visualization of images, tracings, or specimens: yes      CritCare Time    Disposition  Final diagnoses:   Fall, initial encounter   Hip injury, left, sequela     Time reflects when diagnosis was documented in both MDM as applicable and the Disposition within this note     Time User Action Codes Description Comment    7/11/2018  5:59 PM Primus Burkitt Add [B94  XXXA] Fall, initial encounter     7/11/2018  5:59 PM Jamila Adelineedwin Cervantes Add [[id number]] Hip injury, left, sequela       ED Disposition     ED Disposition Condition Comment    Transfer to Another [facility] should be transferred out to [facility]        MD Documentation      Most Recent Value   Patient Condition  The patient has been stabilized such that within reasonable medical probability, no material deterioration of the patient condition or the condition of the unborn child(kendall) is likely to result from the transfer   Reason for Transfer  Level of Care needed not available at this facility   Benefits of Transfer  Specialized equipment and/or services available at the receiving facility (Include comment)________________________   Risks of Transfer  Potential for delay in receiving treatment, Potential deterioration of medical condition, Loss of IV, Increased discomfort during transfer, Possible worsening of condition or death during transfer   Provider Certification  General risk, such as traffic hazards, adverse weather conditions, rough terrain or turbulence, possible failure of equipment (including vehicle or aircraft), or consequences of actions of persons outside the control of the transport personnel      Follow-up Information None         Patient's Medications   Discharge Prescriptions    No medications on file     No discharge procedures on file      ED Provider  Electronically Signed by           Jess Patrick DO  07/11/18 7044

## 2018-07-11 NOTE — TRAUMA DOCUMENTATION
Patient observed pulling out IV and disconnecting herself from heart monitor  Patient redirected and given distractions  Dr Jatinder Salomon made aware

## 2018-07-11 NOTE — EMTALA/ACUTE CARE TRANSFER
[address]  [address] [facility] [address]  Dept: [phone]      EMTALA TRANSFER CONSENT    NAME Agatha Patton                                         1931                              MRN [id number]    I have been informed of my rights regarding examination, treatment, and transfer   by Dr Norma Méndez DO    Benefits: Specialized equipment and/or services available at the receiving facility (Include comment)________________________    Risks: Potential for delay in receiving treatment, Potential deterioration of medical condition, Loss of IV, Increased discomfort during transfer, Possible worsening of condition or death during transfer      Consent for Transfer:  I acknowledge that my medical condition has been evaluated and explained to me by the emergency department physician or other qualified medical person and/or my attending physician, who has recommended that I be transferred to the service of    at    The above potential benefits of such transfer, the potential risks associated with such transfer, and the probable risks of not being transferred have been explained to me, and I fully understand them  The doctor has explained that, in my case, the benefits of transfer outweigh the risks  I agree to be transferred  I authorize the performance of emergency medical procedures and treatments upon me in both transit and upon arrival at the receiving facility  Additionally, I authorize the release of any and all medical records to the receiving facility and request they be transported with me, if possible  I understand that the safest mode of transportation during a medical emergency is an ambulance and that the Hospital advocates the use of this mode of transport   Risks of traveling to the receiving facility by car, including absence of medical control, life sustaining equipment, such as oxygen, and medical personnel has been explained to me and I fully understand them  (ANDIE CORRECT BOX BELOW)  [  ]  I consent to the stated transfer and to be transported by ambulance/helicopter  [  ]  I consent to the stated transfer, but refuse transportation by ambulance and accept full responsibility for my transportation by car  I understand the risks of non-ambulance transfers and I exonerate the Hospital and its staff from any deterioration in my condition that results from this refusal     X___________________________________________    DATE  18  TIME________  Signature of patient or legally responsible individual signing on patient behalf           RELATIONSHIP TO PATIENT_________________________          Provider Certification    NAME Rene Patel                                         1931                              MRN [id number]    A medical screening exam was performed on the above named patient  Based on the examination:    Condition Necessitating Transfer The primary encounter diagnosis was Fall, initial encounter  A diagnosis of Hip injury, left, sequela was also pertinent to this visit      Patient Condition: The patient has been stabilized such that within reasonable medical probability, no material deterioration of the patient condition or the condition of the unborn child(kendall) is likely to result from the transfer    Reason for Transfer: Level of Care needed not available at this facility    Transfer Requirements: Facility     · Space available and qualified personnel available for treatment as acknowledged by    · Agreed to accept transfer and to provide appropriate medical treatment as acknowledged by          · Appropriate medical records of the examination and treatment of the patient are provided at the time of transfer   [address], [address] _______  · Transfer will be performed by qualified personnel from    and appropriate transfer equipment as required, including the use of necessary and appropriate life support measures  Provider Certification: I have examined the patient and explained the following risks and benefits of being transferred/refusing transfer to the patient/family:  General risk, such as traffic hazards, adverse weather conditions, rough terrain or turbulence, possible failure of equipment (including vehicle or aircraft), or consequences of actions of persons outside the control of the transport personnel      Based on these reasonable risks and benefits to the patient and/or the unborn child(kendall), and based upon the information available at the time of the patients examination, I certify that the medical benefits reasonably to be expected from the provision of appropriate medical treatments at another medical facility outweigh the increasing risks, if any, to the individuals medical condition, and in the case of labor to the unborn child, from effecting the transfer      X____________________________________________ DATE 07/11/18        TIME_______      ORIGINAL - SEND TO MEDICAL RECORDS   COPY - SEND WITH PATIENT DURING TRANSFER

## 2018-07-12 ENCOUNTER — ANESTHESIA EVENT (INPATIENT)
Dept: PERIOP | Facility: HOSPITAL | Age: 83
DRG: 470 | End: 2018-07-12
Payer: MEDICARE

## 2018-07-12 ENCOUNTER — APPOINTMENT (INPATIENT)
Dept: RADIOLOGY | Facility: HOSPITAL | Age: 83
DRG: 470 | End: 2018-07-12
Payer: MEDICARE

## 2018-07-12 ENCOUNTER — HOSPITAL ENCOUNTER (INPATIENT)
Facility: HOSPITAL | Age: 83
LOS: 6 days | Discharge: NURSING, REHAB OR LONG-TERM CARE | DRG: 470 | End: 2018-07-18
Attending: HOSPITALIST | Admitting: INTERNAL MEDICINE
Payer: MEDICARE

## 2018-07-12 DIAGNOSIS — S72.009B: Primary | ICD-10-CM

## 2018-07-12 DIAGNOSIS — M25.552 LEFT HIP PAIN: ICD-10-CM

## 2018-07-12 DIAGNOSIS — I48.21 PERMANENT ATRIAL FIBRILLATION (HCC): ICD-10-CM

## 2018-07-12 DIAGNOSIS — S72.142A DISPLACED INTERTROCHANTERIC FRACTURE OF LEFT FEMUR, INITIAL ENCOUNTER FOR CLOSED FRACTURE (HCC): ICD-10-CM

## 2018-07-12 DIAGNOSIS — I50.32 CHRONIC DIASTOLIC CONGESTIVE HEART FAILURE (HCC): ICD-10-CM

## 2018-07-12 PROBLEM — J42 CHRONIC BRONCHITIS (HCC): Status: ACTIVE | Noted: 2018-07-12

## 2018-07-12 PROBLEM — R26.2 AMBULATORY DYSFUNCTION: Status: ACTIVE | Noted: 2018-07-12

## 2018-07-12 LAB
ABO GROUP BLD: NORMAL
ANION GAP SERPL CALCULATED.3IONS-SCNC: 7 MMOL/L (ref 4–13)
ATRIAL RATE: 75 BPM
BLD GP AB SCN SERPL QL: NEGATIVE
BUN SERPL-MCNC: 26 MG/DL (ref 5–25)
CALCIUM SERPL-MCNC: 9 MG/DL (ref 8.3–10.1)
CHLORIDE SERPL-SCNC: 106 MMOL/L (ref 100–108)
CO2 SERPL-SCNC: 30 MMOL/L (ref 21–32)
CREAT SERPL-MCNC: 1.41 MG/DL (ref 0.6–1.3)
ERYTHROCYTE [DISTWIDTH] IN BLOOD BY AUTOMATED COUNT: 15.5 % (ref 11.6–15.1)
GFR SERPL CREATININE-BSD FRML MDRD: 34 ML/MIN/1.73SQ M
GLUCOSE SERPL-MCNC: 79 MG/DL (ref 65–140)
HCT VFR BLD AUTO: 32 % (ref 34.8–46.1)
HGB BLD-MCNC: 9.8 G/DL (ref 11.5–15.4)
MCH RBC QN AUTO: 31.1 PG (ref 26.8–34.3)
MCHC RBC AUTO-ENTMCNC: 30.6 G/DL (ref 31.4–37.4)
MCV RBC AUTO: 102 FL (ref 82–98)
PLATELET # BLD AUTO: 303 THOUSANDS/UL (ref 149–390)
PMV BLD AUTO: 10.2 FL (ref 8.9–12.7)
POTASSIUM SERPL-SCNC: 3.3 MMOL/L (ref 3.5–5.3)
QRS AXIS: -1 DEGREES
QRSD INTERVAL: 92 MS
QT INTERVAL: 424 MS
QTC INTERVAL: 489 MS
RBC # BLD AUTO: 3.15 MILLION/UL (ref 3.81–5.12)
RH BLD: POSITIVE
SODIUM SERPL-SCNC: 143 MMOL/L (ref 136–145)
SPECIMEN EXPIRATION DATE: NORMAL
T WAVE AXIS: -4 DEGREES
VENTRICULAR RATE: 80 BPM
WBC # BLD AUTO: 7.64 THOUSAND/UL (ref 4.31–10.16)

## 2018-07-12 PROCEDURE — 86923 COMPATIBILITY TEST ELECTRIC: CPT

## 2018-07-12 PROCEDURE — 80048 BASIC METABOLIC PNL TOTAL CA: CPT | Performed by: INTERNAL MEDICINE

## 2018-07-12 PROCEDURE — 94760 N-INVAS EAR/PLS OXIMETRY 1: CPT

## 2018-07-12 PROCEDURE — 73560 X-RAY EXAM OF KNEE 1 OR 2: CPT

## 2018-07-12 PROCEDURE — 93010 ELECTROCARDIOGRAM REPORT: CPT | Performed by: INTERNAL MEDICINE

## 2018-07-12 PROCEDURE — 86901 BLOOD TYPING SEROLOGIC RH(D): CPT | Performed by: ORTHOPAEDIC SURGERY

## 2018-07-12 PROCEDURE — 86850 RBC ANTIBODY SCREEN: CPT | Performed by: ORTHOPAEDIC SURGERY

## 2018-07-12 PROCEDURE — 85027 COMPLETE CBC AUTOMATED: CPT | Performed by: INTERNAL MEDICINE

## 2018-07-12 PROCEDURE — 94640 AIRWAY INHALATION TREATMENT: CPT

## 2018-07-12 PROCEDURE — 99222 1ST HOSP IP/OBS MODERATE 55: CPT | Performed by: ORTHOPAEDIC SURGERY

## 2018-07-12 PROCEDURE — 99223 1ST HOSP IP/OBS HIGH 75: CPT | Performed by: INTERNAL MEDICINE

## 2018-07-12 PROCEDURE — 71046 X-RAY EXAM CHEST 2 VIEWS: CPT

## 2018-07-12 PROCEDURE — 86900 BLOOD TYPING SEROLOGIC ABO: CPT | Performed by: ORTHOPAEDIC SURGERY

## 2018-07-12 RX ORDER — POLYETHYLENE GLYCOL 3350 17 G/17G
17 POWDER, FOR SOLUTION ORAL DAILY
Status: DISCONTINUED | OUTPATIENT
Start: 2018-07-12 | End: 2018-07-18 | Stop reason: HOSPADM

## 2018-07-12 RX ORDER — ASPIRIN 325 MG
325 TABLET ORAL DAILY
Status: DISCONTINUED | OUTPATIENT
Start: 2018-07-12 | End: 2018-07-18 | Stop reason: HOSPADM

## 2018-07-12 RX ORDER — ALLOPURINOL 100 MG/1
100 TABLET ORAL DAILY
Status: DISCONTINUED | OUTPATIENT
Start: 2018-07-12 | End: 2018-07-18 | Stop reason: HOSPADM

## 2018-07-12 RX ORDER — ALBUTEROL SULFATE 2.5 MG/3ML
2.5 SOLUTION RESPIRATORY (INHALATION) EVERY 4 HOURS PRN
Status: DISCONTINUED | OUTPATIENT
Start: 2018-07-12 | End: 2018-07-18 | Stop reason: HOSPADM

## 2018-07-12 RX ORDER — SENNOSIDES 8.6 MG
8.6 TABLET ORAL DAILY
Status: DISCONTINUED | OUTPATIENT
Start: 2018-07-12 | End: 2018-07-18 | Stop reason: HOSPADM

## 2018-07-12 RX ORDER — SENNOSIDES 8.6 MG
2 TABLET ORAL DAILY
Status: DISCONTINUED | OUTPATIENT
Start: 2018-07-12 | End: 2018-07-12

## 2018-07-12 RX ORDER — FUROSEMIDE 10 MG/ML
40 INJECTION INTRAMUSCULAR; INTRAVENOUS ONCE
Status: COMPLETED | OUTPATIENT
Start: 2018-07-12 | End: 2018-07-12

## 2018-07-12 RX ORDER — POTASSIUM CHLORIDE 20 MEQ/1
20 TABLET, EXTENDED RELEASE ORAL ONCE
Status: DISCONTINUED | OUTPATIENT
Start: 2018-07-12 | End: 2018-07-18 | Stop reason: HOSPADM

## 2018-07-12 RX ORDER — ATENOLOL 50 MG/1
50 TABLET ORAL DAILY
Status: DISCONTINUED | OUTPATIENT
Start: 2018-07-12 | End: 2018-07-18 | Stop reason: HOSPADM

## 2018-07-12 RX ORDER — PANTOPRAZOLE SODIUM 40 MG/1
40 TABLET, DELAYED RELEASE ORAL
Status: DISCONTINUED | OUTPATIENT
Start: 2018-07-12 | End: 2018-07-18 | Stop reason: HOSPADM

## 2018-07-12 RX ORDER — OXYCODONE HYDROCHLORIDE 5 MG/1
5 TABLET ORAL EVERY 4 HOURS PRN
Status: DISCONTINUED | OUTPATIENT
Start: 2018-07-12 | End: 2018-07-18 | Stop reason: HOSPADM

## 2018-07-12 RX ORDER — QUETIAPINE FUMARATE 25 MG/1
12.5 TABLET, FILM COATED ORAL
Status: DISCONTINUED | OUTPATIENT
Start: 2018-07-12 | End: 2018-07-18 | Stop reason: HOSPADM

## 2018-07-12 RX ORDER — LEVALBUTEROL 1.25 MG/.5ML
1.25 SOLUTION, CONCENTRATE RESPIRATORY (INHALATION)
Status: DISCONTINUED | OUTPATIENT
Start: 2018-07-12 | End: 2018-07-18 | Stop reason: HOSPADM

## 2018-07-12 RX ORDER — POTASSIUM CHLORIDE 750 MG/1
10 TABLET, EXTENDED RELEASE ORAL EVERY OTHER DAY
Status: DISCONTINUED | OUTPATIENT
Start: 2018-07-12 | End: 2018-07-18 | Stop reason: HOSPADM

## 2018-07-12 RX ORDER — NYSTATIN 100000 [USP'U]/G
1 POWDER TOPICAL 2 TIMES DAILY
Status: DISCONTINUED | OUTPATIENT
Start: 2018-07-12 | End: 2018-07-18 | Stop reason: HOSPADM

## 2018-07-12 RX ORDER — SODIUM CHLORIDE, SODIUM LACTATE, POTASSIUM CHLORIDE, CALCIUM CHLORIDE 600; 310; 30; 20 MG/100ML; MG/100ML; MG/100ML; MG/100ML
75 INJECTION, SOLUTION INTRAVENOUS CONTINUOUS
Status: DISCONTINUED | OUTPATIENT
Start: 2018-07-13 | End: 2018-07-12

## 2018-07-12 RX ORDER — BUDESONIDE AND FORMOTEROL FUMARATE DIHYDRATE 160; 4.5 UG/1; UG/1
2 AEROSOL RESPIRATORY (INHALATION) 2 TIMES DAILY
Status: DISCONTINUED | OUTPATIENT
Start: 2018-07-12 | End: 2018-07-18 | Stop reason: HOSPADM

## 2018-07-12 RX ORDER — ONDANSETRON 2 MG/ML
4 INJECTION INTRAMUSCULAR; INTRAVENOUS EVERY 6 HOURS PRN
Status: DISCONTINUED | OUTPATIENT
Start: 2018-07-12 | End: 2018-07-18 | Stop reason: HOSPADM

## 2018-07-12 RX ORDER — FUROSEMIDE 40 MG/1
40 TABLET ORAL DAILY
Status: DISCONTINUED | OUTPATIENT
Start: 2018-07-12 | End: 2018-07-12

## 2018-07-12 RX ORDER — IPRATROPIUM BROMIDE AND ALBUTEROL SULFATE 2.5; .5 MG/3ML; MG/3ML
3 SOLUTION RESPIRATORY (INHALATION) 4 TIMES DAILY
Status: DISCONTINUED | OUTPATIENT
Start: 2018-07-12 | End: 2018-07-12

## 2018-07-12 RX ADMIN — IPRATROPIUM BROMIDE 0.5 MG: 0.5 SOLUTION RESPIRATORY (INHALATION) at 19:06

## 2018-07-12 RX ADMIN — FUROSEMIDE 40 MG: 10 INJECTION, SOLUTION INTRAMUSCULAR; INTRAVENOUS at 14:51

## 2018-07-12 RX ADMIN — LEVALBUTEROL HYDROCHLORIDE 1.25 MG: 1.25 SOLUTION, CONCENTRATE RESPIRATORY (INHALATION) at 13:34

## 2018-07-12 RX ADMIN — SILVER SULFADIAZINE 1 APPLICATION: 10 CREAM TOPICAL at 08:13

## 2018-07-12 RX ADMIN — LEVALBUTEROL HYDROCHLORIDE 1.25 MG: 1.25 SOLUTION, CONCENTRATE RESPIRATORY (INHALATION) at 07:17

## 2018-07-12 RX ADMIN — IPRATROPIUM BROMIDE 0.5 MG: 0.5 SOLUTION RESPIRATORY (INHALATION) at 13:34

## 2018-07-12 RX ADMIN — BUDESONIDE AND FORMOTEROL FUMARATE DIHYDRATE 2 PUFF: 160; 4.5 AEROSOL RESPIRATORY (INHALATION) at 18:59

## 2018-07-12 RX ADMIN — IPRATROPIUM BROMIDE 0.5 MG: 0.5 SOLUTION RESPIRATORY (INHALATION) at 07:17

## 2018-07-12 RX ADMIN — PANTOPRAZOLE SODIUM 40 MG: 40 TABLET, DELAYED RELEASE ORAL at 06:24

## 2018-07-12 RX ADMIN — NYSTATIN 1 APPLICATION: 100000 POWDER TOPICAL at 08:13

## 2018-07-12 RX ADMIN — BUDESONIDE AND FORMOTEROL FUMARATE DIHYDRATE 2 PUFF: 160; 4.5 AEROSOL RESPIRATORY (INHALATION) at 08:11

## 2018-07-12 RX ADMIN — LEVALBUTEROL HYDROCHLORIDE 1.25 MG: 1.25 SOLUTION, CONCENTRATE RESPIRATORY (INHALATION) at 19:06

## 2018-07-12 NOTE — OCCUPATIONAL THERAPY NOTE
Occupational Therapy Screen    Orders received  Chart review completed  Pt pending OR w/ ortho  Will sign off at this time  Please re-consult post-op w/ updated WBS and activity orders  D/C OT  Thank you!       Lauri Lara MS, OTR/L

## 2018-07-12 NOTE — ASSESSMENT & PLAN NOTE
· Permanent afib  Seen by cardiology last admission prior to OR     · Rate controlled on tele, not on AC due to history of ICH in the past

## 2018-07-12 NOTE — PROGRESS NOTES
Progress Note - Torito Santi 11/23/1931, 80 y o  female MRN: [id number]  Unit/Bed#: CW3 337-01 Encounter: [id number] DOS: 7/12/18  Primary Care Provider: Vick Nichols   Date and time admitted to hospital: 7/12/2018 12:44 AM    Ambulatory dysfunction   Assessment & Plan    · Patient suffered mechanical fall from bed yesterday at [facility]  Recently admitted after cephalomedullary nail left proximal femur by Dr Yanni Call 5/28/18  · Left hip hardware failure   · Appreciate ortho input, plan for OR tomorrow for removal of hardware left hip and conversion to long stem hip hemiarthroplasty  · NPO at midnight    · Geriatric pain order set   · Attending to risk stratify for preop clearance   · EKG, CXR, AM labs         Chronic diastolic congestive heart failure ([facility] [address] )   Assessment & Plan    · Normally on lasix 60mg BID - will hold AM dose since patient going to OR   · Continue atenolol   · No ACEI or ARB 2/2 CKD  · Appears slightly vol overload, will give 40mg IV lasix now  · CXR pending   · Monitor I/O, daily wts, low sodium diet         Stage 4 chronic kidney disease ([facility] [address] )   Assessment & Plan    · Baseline creatinine 2-2 5   · Stable, monitor labs         Oropharyngeal dysphagia   Assessment & Plan    · Continue dysphagia pureed with HTL        Atrial fibrillation   Assessment & Plan    · Permanent afib  Seen by cardiology last admission prior to OR     · Rate controlled on tele, not on AC due to history of ICH in the past        Alzheimer disease   Assessment & Plan    · Alert but oriented to self only  · Regular sleep/wake cycles  · High risk for delirium        Chronic bronchitis (HCC)   Assessment & Plan    · Daughter states this is new within last couple months with recurrent bronchitis and wheezing   · continue on symbicort, and ATC nebs           VTE Pharmacologic Prophylaxis:   Pharmacologic: Enoxaparin (Lovenox)  Mechanical VTE Prophylaxis in Place: Yes    Patient Centered Rounds: I have performed bedside rounds with nursing staff today  Discussions with Specialists or Other Care Team Provider: nursing, my attending, ortho     Education and Discussions with Family / Patient: patient, called dtr     Time Spent for Care: 30 minutes  More than 50% of total time spent on counseling and coordination of care as described above  Current Length of Stay: 0 day(s)    Current Patient Status: Inpatient   Certification Statement: The patient will continue to require additional inpatient hospital stay due to amb dysfunction and falls with displaced hip hardware requiring surgical intervention     Discharge Plan / Estimated Discharge Date: patient resides at [facility] and gets rehab there     Code Status: Level 1 - Full Code    Subjective:   Pt seen and examined at bedside  No new complaints  Wheezing but dtr states this is chronic  Thinks she is chronically aspirating  Objective:     Vitals:   Temp (24hrs), Av 1 °F (36 7 °C), Min:97 8 °F (36 6 °C), Max:98 6 °F (37 °C)    HR:  [86-95] 95  Resp:  [18-20] 20  BP: (134-163)/(78-98) 153/95  SpO2:  [96 %-99 %] 99 %  Body mass index is 35 4 kg/m²  Input and Output Summary (last 24 hours): Intake/Output Summary (Last 24 hours) at 18 1436  Last data filed at 18 1100   Gross per 24 hour   Intake                0 ml   Output              281 ml   Net             -281 ml     Physical Exam:     Physical Exam   Constitutional: She appears well-developed and well-nourished  No distress  HENT:   Head: Normocephalic and atraumatic  Mouth/Throat: Oropharynx is clear and moist    Eyes: EOM are normal  No scleral icterus  Neck: Normal range of motion  Neck supple  Cardiovascular: Normal rate and normal heart sounds  No murmur heard  Irregularly irregular rhythm    Pulmonary/Chest: Effort normal  No respiratory distress  She has wheezes  On room air    Abdominal: Soft  Bowel sounds are normal    Musculoskeletal: Normal range of motion   She exhibits edema (edema bilateral LE)  Neurological: She is alert  Skin: Skin is warm and dry  There is erythema (left later leg below knee, no cellulitis )  Psychiatric:   Pleasantly demented    Nursing note and vitals reviewed  Additional Data:     Labs:      Results from last 7 days  Lab Units 07/12/18  0441 07/11/18  1713   WBC Thousand/uL 7 64 9 19   HEMOGLOBIN g/dL 9 8* 10 5*   HEMATOCRIT % 32 0* 33 4*   PLATELETS Thousands/uL 303 322   NEUTROS PCT %  --  69   LYMPHS PCT %  --  9*   MONOS PCT %  --  9   EOS PCT %  --  13*       Results from last 7 days  Lab Units 07/12/18  0441 07/11/18  1713   SODIUM mmol/L 143 143   POTASSIUM mmol/L 3 3* 4 2   CHLORIDE mmol/L 106 103   CO2 mmol/L 30 32   BUN mg/dL 26* 29*   CREATININE mg/dL 1 41* 1 53*   CALCIUM mg/dL 9 0 8 8   TOTAL PROTEIN g/dL  --  7 1   BILIRUBIN TOTAL mg/dL  --  0 50   ALK PHOS U/L  --  157*   ALT U/L  --  18   AST U/L  --  34   GLUCOSE RANDOM mg/dL 79 107       Results from last 7 days  Lab Units 07/11/18  1713   INR  1 08       * I Have Reviewed All Lab Data Listed Above  * Additional Pertinent Lab Tests Reviewed: Omar 66 Admission Reviewed    Imaging:    Imaging Reports Reviewed Today Include: all   Imaging Personally Reviewed by Myself Includes:  none    Recent Cultures (last 7 days):           Last 24 Hours Medication List:     Current Facility-Administered Medications:  albuterol 2 5 mg Nebulization Q4H PRN Adolph Frazier MD   allopurinol 100 mg Oral Daily Sudarshan Thakkar MD   aspirin 325 mg Oral Daily Sudarshan Thakkar MD   atenolol 50 mg Oral Daily Sudarshan Thakkar MD   budesonide-formoterol 2 puff Inhalation BID Sudarshan Thakkar MD   enoxaparin 30 mg Subcutaneous Q24H [address] Sudarshan Orellana MD   furosemide 40 mg Intravenous Once Marky Toth PA-C   ipratropium 0 5 mg Nebulization TID Adolph Frazier MD   [START ON 7/13/2018] lactated ringers 75 mL/hr Intravenous Continuous Cecilio Wilson MD   levalbuterol 1 25 mg Nebulization TID Jolie Rich MD   nystatin 1 application Topical BID Biljosseline Thakkar MD   ondansetron 4 mg Intravenous Q6H PRN Bilal VIVIANA Thakkar MD   oxyCODONE 5 mg Oral Q4H PRN Bilal VIVIANA Thakkar MD   pantoprazole 40 mg Oral Early Morning Bilal VIVIANA Thakkar MD   polyethylene glycol 17 g Oral Daily Bilal VIVIANA Blankenship MD   potassium chloride 10 mEq Oral Every Other Day Bilal VIVIANA Blankenshpi MD   potassium chloride 20 mEq Oral Once Marky Toth PA-C   QUEtiapine 12 5 mg Oral HS PRN Bilal VIVIANA Thakkar MD   senna 8 6 mg Oral Daily Biljosseline Thakkar MD   silver sulfadiazine 1 application Topical Daily PRN Biljosseline Blankenship MD        Today, Patient Was Seen By: Emre Engle PA-C    ** Please Note: This note has been constructed using a voice recognition system   **

## 2018-07-12 NOTE — CONSULTS
Orthopedics   Torito Hancock 80 y o  female MRN: [id number]  Unit/Bed#: [facility] 337-01      Chief Complaint:   left hip pain    HPI:   80 y  o female status post mechanical fall complaining of left hip pain and inability to bear weight  Pain is well localized to the hip and is made worse with motion or contact to the area and improves at rest  Denies numbness or tingling  Patient is approx 6 weeks s/p L cephalomedullary nail fixation of left IT femur fracture  She has a hx of dementia and is a poor historian and cannot reliably provide history and does not know she is in the hospital  Multiple medical problems including a-fib rate controlled, CHF, CKD  Review Of Systems:   · Skin: healed left hip incision   · Neuro: See HPI  · Musculoskeletal: See HPI  · 14 point review of systems unobtainable due to pts condition    Past Medical History:   Past Medical History:   Diagnosis Date    A-fib ([facility] )     Alzheimer's dementia     CHF (congestive heart failure) ([facility] )     Gout     Hypertension     Renal disorder        Past Surgical History:   Past Surgical History:   Procedure Laterality Date    [place] OPEN RX FEMUR FX+INTRAMED DUSTIN Left 5/28/2018    Procedure: INSERTION NAIL IM FEMUR ANTEGRADE (TROCHANTERIC); Surgeon: Romario Farah MD;  Location: [facility];  Service: Orthopedics       Family History:  Family history reviewed and non-contributory  History reviewed  No pertinent family history  Social History:  Social History     Social History    Marital status:      Spouse name: N/A    Number of children: N/A    Years of education: N/A     Social History Main Topics    Smoking status: Never Smoker    Smokeless tobacco: Never Used    Alcohol use No    Drug use: No    Sexual activity: No     Other Topics Concern    None     Social History Narrative    None       Allergies:    Allergies   Allergen Reactions    Penicillins Other (See Comments)     Unknown reaction; however, tolerated cefazolin  Rivastigmine            Labs:    0  Lab Value Date/Time   HCT 32 0 (L) 07/12/2018 0441   HCT 33 4 (L) 07/11/2018 1713   HCT 24 3 (L) 06/02/2018 0515   HCT 25 7 (L) 06/01/2018 0459   HGB 9 8 (L) 07/12/2018 0441   HGB 10 5 (L) 07/11/2018 1713   HGB 8 1 (L) 06/02/2018 0515   HGB 8 0 (L) 06/01/2018 0459   INR 1 08 07/11/2018 1713   WBC 7 64 07/12/2018 0441   WBC 9 19 07/11/2018 1713   WBC 11 2 (H) 06/02/2018 0515   WBC 12 57 (H) 06/01/2018 0459   ESR 92 (H) 05/29/2018 1353       Meds:    Current Facility-Administered Medications:     albuterol inhalation solution 2 5 mg, 2 5 mg, Nebulization, Q4H PRN, Antionette Farnsworth MD    allopurinol (ZYLOPRIM) tablet 100 mg, 100 mg, Oral, Daily, Sudarshan Thakkar MD    aspirin tablet 325 mg, 325 mg, Oral, Daily, Sudarshan Thakkar MD    atenolol (TENORMIN) tablet 50 mg, 50 mg, Oral, Daily, Sudarshan Thakkar MD    budesonide-formoterol (SYMBICORT) 160-4 5 mcg/act inhaler 2 puff, 2 puff, Inhalation, BID, Sudarshan Thakkar MD    enoxaparin (LOVENOX) subcutaneous injection 30 mg, 30 mg, Subcutaneous, Q24H [address], Sudarshan Thakkar MD    furosemide (LASIX) tablet 40 mg, 40 mg, Oral, Daily, Sudarshan Thakkar MD    ipratropium (ATROVENT) 0 02 % inhalation solution 0 5 mg, 0 5 mg, Nebulization, TID, Antionette Farnsworth MD    levalbuterol (XOPENEX) inhalation solution 1 25 mg, 1 25 mg, Nebulization, TID, Antionette Farnsworth MD    nystatin (MYCOSTATIN) powder 1 application, 1 application, Topical, BID, Sudarshan Thakkar MD    ondansetron (ZOFRAN) injection 4 mg, 4 mg, Intravenous, Q6H PRN, Sudarshan Thakkar MD    oxyCODONE (ROXICODONE) IR tablet 5 mg, 5 mg, Oral, Q4H PRN, Sudarshan Thakkar MD    pantoprazole (PROTONIX) EC tablet 40 mg, 40 mg, Oral, Early Morning, Sudarshan Mckenzie MD, 40 mg at 07/12/18 0624    polyethylene glycol (MIRALAX) packet 17 g, 17 g, Oral, Daily, Sudarshan Thakkar MD    potassium chloride (K-DUR,KLOR-CON) CR tablet 10 mEq, 10 mEq, Oral, Every Other Day, Sudarshan MICHELLE  MD Bijan    QUEtiapine (SEROquel) tablet 12 5 mg, 12 5 mg, Oral, HS PRN, Sudarshan Thakkar MD    senna (SENOKOT) tablet 17 2 mg, 2 tablet, Oral, Daily, Sudarshan Thakkar MD    senna (SENOKOT) tablet 8 6 mg, 8 6 mg, Oral, Daily, Sudarshan Thakkar MD    silver sulfadiazine (SILVADENE,SSD) 1 % cream 1 application, 1 application, Topical, Daily PRN, Sudarshan Sotomayor MD    Blood Culture:   No results found for: BLOODCX    Wound Culture:   Lab Results   Component Value Date    WOUNDCULT 2+ Growth of Staphylococcus aureus (A) 02/15/2018       Ins and Outs:  No intake/output data recorded  Physical Exam:   /78 (BP Location: Left arm)   Pulse 89   Temp 97 8 °F (36 6 °C) (Oral)   Resp 20   Wt 87 8 kg (193 lb 9 oz)   SpO2 96%   BMI 35 40 kg/m²   Gen: AAO X 1 only   HEENT: EOMI, eyes clear, moist mucus membranes, hearing intact  Respiratory: Bilateral chest rise  No audible wheezing found  Cardiovascular: Regular Rate, no palpable arrhythmia   Abdomen: soft nontender/nondistended  Musculoskeletal: left lower extremity  · Skin intact, well healed hip incision, limb shortened   · Tender to palpation over hip  · Positive log roll  · Sensation intact s/s/sp/dp/t  · Positive ankle dorsi/plantar flexion, EHL/FHL  · 2+ DP pulse      Radiology:   I personally reviewed the films  X-rays left hip shows cut out of femur IMN     _*_*_*_*_*_*_*_*_*_*_*_*_*_*_*_*_*_*_*_*_*_*_*_*_*_*_*_*_*_*_*_*_*_*_*_*_*_*_*_*_*    Assessment:  80 y  o female status post mechanical fall with left hip hardware failure with cut out    Plan:   · Non weight bearing left lower extremity  · Analgesics for pain  · NPO at midnight  · Medicine for all medical management and preoperative risk stratification  · Pre op labs  · Needs consent from POA  · To OR tomorrow for removal of hardware left hip, conversion to long stem hip maged arthroplasty and all indicated procedures   · Dispo: Ortho will follow      Sridhar Ashton MD

## 2018-07-12 NOTE — CASE MANAGEMENT
Initial Clinical Review    Admission: Date/Time/Statement: 7/12/18 @ 0302    Orders Placed This Encounter   Procedures    Inpatient Admission     Standing Status:   Standing     Number of Occurrences:   1     Order Specific Question:   Admitting Physician     Answer:   Khushboo Alexis     Order Specific Question:   Level of Care     Answer:   Med Surg [16]     Order Specific Question:   Estimated length of stay     Answer:   More than 2 Midnights     Order Specific Question:   Certification     Answer:   I certify that inpatient services are medically necessary for this patient for a duration of greater than two midnights  See H&P and MD Progress Notes for additional information about the patient's course of treatment  Date/Time/Mode of Arrival: 7/11 Transfer from [address] [place] [address],[address] Floor ED    Chief Complaint: Fall    History of Illness: 80 y o  female who presents with fall at nursing facility  Patient has advanced dementia and is only alert to self not oriented to time or place  Patient exam limited to history of being obtained from records only as patient is unable to give history as to why she is in the hospital or what brought her here  Patient reports of not being in any pain does not have any acute complaints  Pleasantly resting in bed  Patient was sent over to the hospital for fall at the facility with concern for displacement of recently placed hip surgical hardware      Vital Signs:   Vitals [07/12/18 0049]   Temperature Pulse Respirations Blood Pressure SpO2   98 6 °F (37 °C) 86 18 163/98 97 %      Temp Source Heart Rate Source Patient Position - Orthostatic VS BP Location FiO2 (%)   Oral Monitor Lying Left arm --      Pain Score       No Pain        Wt Readings from Last 1 Encounters:   07/12/18 87 8 kg (193 lb 9 oz)       Vital Signs (abnormal):  WNL    Abnormal Labs:   BUN 5 - 25 mg/dL 29     Creatinine 0 60 - 1 30 mg/dL 1 53        07/12/18 0441    Sodium 136 - 145 mmol/L 143 Potassium 3 5 - 5 3 mmol/L 3 3       07/12/18 0441    WBC 4 31 - 10 16 Thousand/uL 7 64    RBC 3 81 - 5 12 Million/uL 3 15     Hemoglobin 11 5 - 15 4 g/dL 9 8     Hematocrit 34 8 - 46 1 % 32 0       Diagnostic Test Results: CT Head - No acute intracranial abnormality  Xray Bilateral Hips - Left hip hardware failure with migration of the femoral neck nail through the superolateral femoral head cortex into the joint space  There is perhaps slightly more pronounced coxa varus deformity of the left hip compared to the postoperative film, however no discernible new fracture  Xray Right Forearm - Question irregularity/lucency of the distal radial styloid  Correlate with any point tenderness  Otherwise, negative for forearm fracture          Past Medical/Surgical History:    Active Ambulatory Problems     Diagnosis Date Noted    Subdural hemorrhage 01/12/2018    Alzheimer disease 01/12/2018    Chronic diastolic congestive heart failure ([facility] [address] ) 01/12/2018    HTN (hypertension) 01/12/2018    Atrial fibrillation 01/12/2018    Stage 4 chronic kidney disease ([facility] [address] ) 01/12/2018    Coagulopathy 01/12/2018    Intraparenchymal hematoma of brain due to trauma 01/12/2018   [facility] (subarachnoid hemorrhage) 01/12/2018    Fall from bed 01/12/2018    Open fracture of hip ([facility] ) 05/27/2018    Displaced intertrochanteric fracture of left femur, initial encounter for closed fracture ([facility] [address] ) 05/27/2018    Acute blood loss anemia 06/01/2018    Oropharyngeal dysphagia 06/01/2018    Left hip pain 07/12/2018     Resolved Ambulatory Problems     Diagnosis Date Noted    Hypokalemia 01/13/2018    Hypernatremia 05/28/2018     Past Medical History:   Diagnosis Date    A-fib [facility])     Alzheimer's dementia     CHF (congestive heart failure) ([facility] )     Gout     Hypertension     Renal disorder        Admitting Diagnosis: Left hip pain [M25 552]    Age/Sex: 80 y o  female    Assessment/Plan:   * Fall from bed   Assessment & Plan   Likely mechanical  Ortho c/s  Admit to inpatient  recent hip surgery now with fall and possible displacement of hardware  Pain controlled          Displaced intertrochanteric fracture of left femur, initial encounter for closed fracture [facility])   Assessment & Plan     Ortho c/s pending            Stage 4 chronic kidney disease ([facility] [address] )   Assessment & Plan     Stable  Cont to monitor          Atrial fibrillation   Assessment & Plan     Rate controlled  Cont to monitor on tele          HTN (hypertension)   Assessment & Plan     Resume home oral regimen          Chronic diastolic congestive heart failure (HCC)   Assessment & Plan     Chronic b/l Le edema  No acute exacerbation  Strict I/Os  C/w lasix          Alzheimer disease   Assessment & Plan     Alert but oriented to self only  Regular sleep/wake cycles  High risk for delirium             VTE Prophylaxis: Enoxaparin (Lovenox)  / foot pump applied   Code Status:  Full      Anticipated Length of Stay:  Patient will be admitted on an Inpatient basis with an anticipated length of stay of  more than 2 midnights  Justification for Hospital Stay:  Fall with possible displacement of recently placed hardware      Admission Orders:  7/13 OR - S/P  Conversion Left Hip Hemiarthroplasty;  Removal of deep Implant Left Proximal Remur    Diet: Dysphagia/ Pureed  Echo  Wound care  Orthopedic Surgery cons  Cardiology cons  PT/OT eval and treat    Scheduled Meds:   Current Facility-Administered Medications:  allopurinol 100 mg Oral Daily   aspirin 325 mg Oral Daily   atenolol 50 mg Oral Daily   budesonide-formoterol 2 puff Inhalation BID   enoxaparin 30 mg Subcutaneous Q24H DIDI   furosemide 40 mg Oral Daily   ipratropium 0 5 mg Nebulization TID   levalbuterol 1 25 mg Nebulization TID   nystatin 1 application Topical BID   pantoprazole 40 mg Oral Early Morning   polyethylene glycol 17 g Oral Daily   potassium chloride 10 mEq Oral Every Other Day   potassium chloride 20 mEq Oral Once   senna 2 tablet Oral Daily   senna 8 6 mg Oral Daily     Continuous Infusions:    ringers infusion  Freq: Continuous PRN  Last Dose: Stopped (07/13/18 1105)  Start: 07/13/18 0812 End: 07/13/18 1155    PRN Meds:   albuterol    ondansetron    oxyCODONE    QUEtiapine    silver sulfadiazine    ----------------------------------------------------------------------------------------------------------------------------    7/12 Orthopedic Surgery cons:    Assessment:  80 y  o female status post mechanical fall with left hip hardware failure with cut out     Plan:   · Non weight bearing left lower extremity  · Analgesics for pain  · NPO at midnight  · Medicine for all medical management and preoperative risk stratification  · Pre op labs  · Needs consent from POA  · To OR tomorrow for removal of hardware left hip, conversion to long stem hip maged arthroplasty and all indicated procedures   · Dispo: Ortho will follow

## 2018-07-12 NOTE — PLAN OF CARE
Problem: Prexisting or High Potential for Compromised Skin Integrity  Goal: Skin integrity is maintained or improved  INTERVENTIONS:  - Identify patients at risk for skin breakdown  - Assess and monitor skin integrity  - Assess and monitor nutrition and hydration status  - Monitor labs (i e  albumin)  - Assess for incontinence   - Turn and reposition patient  - Assist with mobility/ambulation  - Relieve pressure over bony prominences  - Avoid friction and shearing  - Provide appropriate hygiene as needed including keeping skin clean and dry  - Evaluate need for skin moisturizer/barrier cream  - Collaborate with interdisciplinary team (i e  Nutrition, Rehabilitation, etc )   - Patient/family teaching   Outcome: Progressing      Problem: PAIN - ADULT  Goal: Verbalizes/displays adequate comfort level or baseline comfort level  Interventions:  - Encourage patient to monitor pain and request assistance  - Assess pain using appropriate pain scale  - Administer analgesics based on type and severity of pain and evaluate response  - Implement non-pharmacological measures as appropriate and evaluate response  - Consider cultural and social influences on pain and pain management  - Notify physician/advanced practitioner if interventions unsuccessful or patient reports new pain  Outcome: Progressing      Problem: INFECTION - ADULT  Goal: Absence or prevention of progression during hospitalization  INTERVENTIONS:  - Assess and monitor for signs and symptoms of infection  - Monitor lab/diagnostic results  - Monitor all insertion sites, i e  indwelling lines, tubes, and drains  - Monitor endotracheal (as able) and nasal secretions for changes in amount and color  - [place] appropriate cooling/warming therapies per order  - Administer medications as ordered  - Instruct and encourage patient and family to use good hand hygiene technique  - Identify and instruct in appropriate isolation precautions for identified infection/condition  Outcome: Progressing    Goal: Absence of fever/infection during neutropenic period  INTERVENTIONS:  - Monitor WBC  - Implement neutropenic guidelines  Outcome: Progressing      Problem: SAFETY ADULT  Goal: Patient will remain free of falls  INTERVENTIONS:  - Assess patient frequently for physical needs  -  Identify cognitive and physical deficits and behaviors that affect risk of falls    -  [place] fall precautions as indicated by assessment   - Educate patient/family on patient safety including physical limitations  - Instruct patient to call for assistance with activity based on assessment  - Modify environment to reduce risk of injury  - Consider OT/PT consult to assist with strengthening/mobility  Outcome: Progressing    Goal: Maintain or return to baseline ADL function  INTERVENTIONS:  -  Assess patient's ability to carry out ADLs; assess patient's baseline for ADL function and identify physical deficits which impact ability to perform ADLs (bathing, care of mouth/teeth, toileting, grooming, dressing, etc )  - Assess/evaluate cause of self-care deficits   - Assess range of motion  - Assess patient's mobility; develop plan if impaired  - Assess patient's need for assistive devices and provide as appropriate  - Encourage maximum independence but intervene and supervise when necessary  ¯ Involve family in performance of ADLs  ¯ Assess for home care needs following discharge   ¯ Request OT consult to assist with ADL evaluation and planning for discharge  ¯ Provide patient education as appropriate  Outcome: Progressing    Goal: Maintain or return mobility status to optimal level  INTERVENTIONS:  - Assess patient's baseline mobility status (ambulation, transfers, stairs, etc )    - Identify cognitive and physical deficits and behaviors that affect mobility  - Identify mobility aids required to assist with transfers and/or ambulation (gait belt, sit-to-stand, lift, walker, cane, etc )  - [place] fall precautions as indicated by assessment  - Record patient progress and toleration of activity level on Mobility SBAR; progress patient to next Phase/Stage  - Instruct patient to call for assistance with activity based on assessment  - Request Rehabilitation consult to assist with strengthening/weightbearing, etc   Outcome: Progressing      Problem: DISCHARGE PLANNING  Goal: Discharge to home or other facility with appropriate resources  INTERVENTIONS:  - Identify barriers to discharge w/patient and caregiver  - Arrange for needed discharge resources and transportation as appropriate  - Identify discharge learning needs (meds, wound care, etc )  - Arrange for interpretive services to assist at discharge as needed  - Refer to Case Management Department for coordinating discharge planning if the patient needs post-hospital services based on physician/advanced practitioner order or complex needs related to functional status, cognitive ability, or social support system  Outcome: Progressing      Problem: Knowledge Deficit  Goal: Patient/family/caregiver demonstrates understanding of disease process, treatment plan, medications, and discharge instructions  Complete learning assessment and assess knowledge base    Interventions:  - Provide teaching at level of understanding  - Provide teaching via preferred learning methods  Outcome: Progressing

## 2018-07-12 NOTE — ASSESSMENT & PLAN NOTE
· Daughter states this is new within last couple months with recurrent bronchitis and wheezing   · continue on symbicort, and ATC nebs

## 2018-07-12 NOTE — RESPIRATORY THERAPY NOTE
RT Protocol Note  Remona Stable 80 y o  female MRN: [id number]  Unit/Bed#: CW3 337-01 Encounter: [id number]    Assessment    Principal Problem:    Fall from bed  Active Problems:    Alzheimer disease    Chronic diastolic congestive heart failure (HCC)    HTN (hypertension)    Atrial fibrillation    Stage 4 chronic kidney disease (HCC)    Displaced intertrochanteric fracture of left femur, initial encounter for closed fracture (HCC)      Home Pulmonary Medications:  Duoneb QID, Pulmicort, Symbicort       Past Medical History:   Diagnosis Date    A-fib ([facility] [place] [facility] )     Alzheimer's dementia     CHF (congestive heart failure) ([facility] [place] [facility] )     Gout     Hypertension     Renal disorder      Social History     Social History    Marital status:      Spouse name: N/A    Number of children: N/A    Years of education: N/A     Social History Main Topics    Smoking status: Never Smoker    Smokeless tobacco: Never Used    Alcohol use No    Drug use: No    Sexual activity: No     Other Topics Concern    None     Social History Narrative    None       Subjective         Objective    Physical Exam:   Assessment Type: Assess only  General Appearance: Awake  Respiratory Pattern: Normal  Chest Assessment: Chest expansion symmetrical  Bilateral Breath Sounds: Clear, Diminished  Cough: Non-productive  O2 Device: RA    Vitals:  Blood pressure 134/78, pulse 89, temperature 97 8 °F (36 6 °C), temperature source Oral, resp  rate 20, weight 87 8 kg (193 lb 9 oz), SpO2 96 %  Imaging and other studies: I have personally reviewed pertinent reports  O2 Device: RA     Plan    Respiratory Plan: Home Bronchodilator Patient pathway        Resp Comments: Pt  evaluated at bedside per Respiratory Protocol  Pt  admitted for s/p with hip pain  Pt  chart states she takes Duoneb QID, Symbicort, and Pulmicort  Pt  somewhat confused and could not confirm exactly what pulmonary meds she takes   Breath Sounds diminished but clear bilaterally  Will order Xopenex/Atrovent udns TID and Albuterol udns prn per Respiratory Protocol at this time

## 2018-07-12 NOTE — H&P
H&P- Noelle Fuller 11/23/1931, 80 y o  female MRN: [id number]    Unit/Bed#: CW3 337-01 Encounter: [id number]    Primary Care Provider: Marge De Leon   Date and time admitted to hospital: 7/12/2018 12:44 AM        * Fall from bed   Assessment & Plan    Likely mechanical  Ortho c/s  Admit to inpatient  recent hip surgery now with fall and possible displacement of hardware  Pain controlled        Displaced intertrochanteric fracture of left femur, initial encounter for closed fracture [facility])   Assessment & Plan    Ortho c/s pending  Stage 4 chronic kidney disease (HCC)   Assessment & Plan    Stable  Cont to monitor        Atrial fibrillation   Assessment & Plan    Rate controlled  Cont to monitor on tele        HTN (hypertension)   Assessment & Plan    Resume home oral regimen        Chronic diastolic congestive heart failure [facility])   Assessment & Plan    Chronic b/l Le edema  No acute exacerbation  Strict I/Os  C/w lasix        Alzheimer disease   Assessment & Plan    Alert but oriented to self only  Regular sleep/wake cycles  High risk for delirium            VTE Prophylaxis: Enoxaparin (Lovenox)  / foot pump applied   Code Status:  Full  Anticipated Length of Stay:  Patient will be admitted on an Inpatient basis with an anticipated length of stay of  more than 2 midnights  Justification for Hospital Stay:  Fall with possible displacement of recently placed hardware    Total Time for Visit, including Counseling / Coordination of Care: 1 hour  Greater than 50% of this total time spent on direct patient counseling and coordination of care  Chief Complaint:   Fall    History of Present Illness:    Noelle Fuller is a 80 y o  female who presents with fall at nursing facility  Patient has advanced dementia and is only alert to self not oriented to time or place    Patient exam limited to history of being obtained from records only as patient is unable to give history as to why she is in the hospital or what brought her here  Patient reports of not being in any pain does not have any acute complaints  Pleasantly resting in bed  Patient was sent over to the hospital for fall at the facility with concern for displacement of recently placed hip surgical hardware  Review of Systems:    12 point review of systems difficult to obtain given advanced dementia but denies any acute complaints  Past Medical and Surgical History:     Past Medical History:   Diagnosis Date    A-fib [facility])     Alzheimer's dementia     CHF (congestive heart failure) ([address] )     Gout     Hypertension     Renal disorder        Past Surgical History:   Procedure Laterality Date    DE OPEN RX FEMUR FX+INTRAMED DUSTIN Left 5/28/2018    Procedure: INSERTION NAIL IM FEMUR ANTEGRADE (TROCHANTERIC); Surgeon: Anahi Balderas MD;  Location: [facility];  Service: Orthopedics       Meds/Allergies:    Prior to Admission medications    Medication Sig Start Date End Date Taking?  Authorizing Provider   acetaminophen (TYLENOL) 500 mg tablet Take 500 mg by mouth every 6 (six) hours as needed for mild pain    Historical Provider, MD   allopurinol (ZYLOPRIM) 100 mg tablet Take 100 mg by mouth daily    Historical Provider, MD   aluminum-magnesium hydroxide-simethicone (MYLANTA) 200-200-20 mg/5 mL suspension Take 15 mL by mouth every 6 (six) hours as needed for indigestion or heartburn 6/1/18   Mikala Proctor,    aspirin 325 mg tablet Take 325 mg by mouth daily    Historical Provider, MD   atenolol (TENORMIN) 50 mg tablet Take 1 tablet (50 mg total) by mouth daily 6/2/18   Mikala Proctor, DO   Budesonide (PULMICORT IN) Inhale    Historical Provider, MD   budesonide-formoterol (SYMBICORT) 160-4 5 mcg/act inhaler Inhale 2 puffs 2 (two) times a day    Historical Provider, MD   cholecalciferol (VITAMIN D3) 1,000 units tablet Take 2,000 Units by mouth daily    Historical Provider, MD   docusate sodium (COLACE) 100 mg capsule Take 100 mg by mouth 2 (two) times a day as needed for constipation    Historical Provider, MD   enoxaparin (LOVENOX) 30 mg/0 3 mL Inject 0 3 mL (30 mg total) under the skin every 24 hours 5/29/18   Stephon Li MD   ferrous sulfate 324 (65 Fe) mg Take 1 tablet (324 mg total) by mouth 2 (two) times a day before meals 6/1/18   Paulina Hobbs DO   furosemide (LASIX) 40 mg tablet Take 1 tablet (40 mg total) by mouth daily 6/1/18   Paulina Hobbs DO   ipratropium-albuterol (DUO-NEB) 0 5-2 5 mg/3 mL Take 3 mL by nebulization 4 (four) times a day    Historical Provider, MD   Magnesium 65 MG TABS Take 65 mg by mouth daily    Historical Provider, MD   Magnesium Oxide (MAG-OX PO) Take 400 mg by mouth    Historical Provider, MD   Melatonin-Pyridoxine (EQL MELATONIN/VITAMIN B-6) 5-1 MG TABS Take 1 tablet by mouth daily at bedtime    Historical Provider, MD   nystatin (MYCOSTATIN) powder Apply 1 application topically 2 (two) times a day as needed    Historical Provider, MD   oxyCODONE (ROXICODONE) 5 mg immediate release tablet Take 5 mg by mouth every 4 (four) hours as needed for moderate pain    Historical Provider, MD   pantoprazole (PROTONIX) 40 mg tablet Take 1 tablet (40 mg total) by mouth daily in the early morning 6/2/18   Paulina Hobbs DO   polyethylene glycol (MIRALAX) 17 g packet Take 17 g by mouth daily    Historical Provider, MD   potassium chloride (K-DUR,KLOR-CON) 10 mEq tablet Take 10 mEq by mouth every other day      Historical Provider, MD   QUEtiapine (SEROquel) 25 mg tablet Take 0 5 tablets (12 5 mg total) by mouth daily at bedtime as needed (agitation / sundowning ) 6/1/18   Paulina Hobbs DO   senna (SENOKOT) 8 6 mg Take 2 tablets (17 2 mg total) by mouth daily 6/2/18   Paulina Hobbs DO   silver sulfadiazine (SILVADENE,SSD) 1 % cream Apply 1 application topically daily as needed for wound care    Historical Provider, MD     I have reviewed home medications with patient personally  Allergies:    Allergies Allergen Reactions    Penicillins Other (See Comments)     Unknown reaction; however, tolerated cefazolin    Rivastigmine        Social History:     Marital Status:    Occupation:  Retired  Not demented  Patient Pre-hospital Living Situation:  Skilled nursing facility  Patient Pre-hospital Level of Mobility:  Limited  Patient Pre-hospital Diet Restrictions:  Low-salt diet  Substance Use History:   History   Alcohol Use No     History   Smoking Status    Never Smoker   Smokeless Tobacco    Never Used     History   Drug Use No       Family History:    non-contributory    Physical Exam:     Vitals:   Blood Pressure: 134/78 (07/12/18 0257)  Pulse: 89 (07/12/18 0257)  Temperature: 97 8 °F (36 6 °C) (07/12/18 0257)  Temp Source: Oral (07/12/18 0257)  Respirations: 20 (07/12/18 0257)  Weight - Scale: 87 8 kg (193 lb 9 oz) (07/12/18 0049)  SpO2: 97 % (07/12/18 0257)    General:  Elderly female in No apparent distress  Comfortable  HEENT:  EOMI  Mucous membranes moist   Cardiovascular:  S1-S2  Pulmonary: Clear to auscultation b/l  Decreased breath sounds at bases  Abdomen:  Obese, soft, NT/ND  BS +ve  Extremities:   Bilateral lower extremity edema  Skin: warm, dry, intact  Neurological: Alert and oriented to self only  Psychiatric: Appropriate mood and affect      Additional Data:     Lab Results: I have personally reviewed pertinent reports          Results from last 7 days  Lab Units 07/11/18  1713   WBC Thousand/uL 9 19   HEMOGLOBIN g/dL 10 5*   HEMATOCRIT % 33 4*   PLATELETS Thousands/uL 322   NEUTROS PCT % 69   LYMPHS PCT % 9*   MONOS PCT % 9   EOS PCT % 13*       Results from last 7 days  Lab Units 07/11/18  1713   SODIUM mmol/L 143   POTASSIUM mmol/L 4 2   CHLORIDE mmol/L 103   CO2 mmol/L 32   BUN mg/dL 29*   CREATININE mg/dL 1 53*   CALCIUM mg/dL 8 8   TOTAL PROTEIN g/dL 7 1   BILIRUBIN TOTAL mg/dL 0 50   ALK PHOS U/L 157*   ALT U/L 18   AST U/L 34   GLUCOSE RANDOM mg/dL 107       Results from last 7 days  Lab Units 07/11/18  1713   INR  1 08       Imaging: I have personally reviewed pertinent reports  Xr Forearm 2 Views Right    Result Date: 7/11/2018  Narrative: RIGHT FOREARM INDICATION:   Right forearm pain, fall  COMPARISON:  None VIEWS:  XR FOREARM 2 VW RIGHT FINDINGS: Question irregularity/lucency of the radial styloid distally  Otherwise, negative for forearm fracture  Bony alignment is maintained  Degenerative changes of the medial elbow as well as the 1st CMC joint  No lytic or blastic lesions are seen  Angiocath in the antecubital fossa  Impression: Question irregularity/lucency of the distal radial styloid  Correlate with any point tenderness  Otherwise, negative for forearm fracture  Workstation performed: [id number]     Xr Hips Bilateral 2 Vw W Pelvis If Performed    Result Date: 7/11/2018  Narrative: BILATERAL HIPS AND PELVIS INDICATION:   Fall, left hip pain  History of dementia  COMPARISON: 5/27/2018, 5/28/2018 VIEWS:  XR HIPS BILATERAL 2 VW W PELVIS IF PERFORMED Images: 5  FINDINGS: The bony pelvis appears intact  Degenerative changes visualized lower lumbar spine  Bilateral pelvic phleboliths  LEFT HIP: Short IM juan with femoral neck nail and distal interlocking screw are again seen transfixing a previous intertrochanteric fracture with extension to the greater trochanter  The femoral neck nail has now migrated beyond the cortex of the superolateral femoral head into the joint space  This is new from the previous study  There is no discernible new fracture although there is probably slightly more pronounced coxa varus deformity  There is heterotopic ossification or abundant callus formation seen about the left femoral neck with avulsion of the lesser trochanter again noted  RIGHT HIP: No significant hip degenerative changes  Joint space alignment is maintained  Soft tissues are unremarkable       Impression: Left hip hardware failure with migration of the femoral neck nail through the superolateral femoral head cortex into the joint space  There is perhaps slightly more pronounced coxa varus deformity of the left hip compared to the postoperative film, however no discernible new fracture  The study was marked in [facility] for immediate notification  Workstation performed: [id number]     Ct Head Without Contrast    Result Date: 7/11/2018  Narrative: CT BRAIN - WITHOUT CONTRAST INDICATION:   fall  COMPARISON:  CT brain 1/12/2018 TECHNIQUE:  CT examination of the brain was performed  In addition to axial images, coronal 2D reformatted images were created and submitted for interpretation  Radiation dose length product (DLP) for this visit:  1937 mGy-cm   This examination, like all CT scans performed in the [facility], was performed utilizing techniques to minimize radiation dose exposure, including the use of iterative reconstruction and automated exposure control  IMAGE QUALITY:  Diagnostic  FINDINGS: PARENCHYMA: Decreased attenuation is noted in periventricular and subcortical white matter demonstrating an appearance that is statistically most likely to represent moderate microangiopathic change; this appearance is similar when compared to most recent prior examination  No CT signs of acute infarction  No intracranial mass, mass effect or midline shift  No acute parenchymal hemorrhage  VENTRICLES AND EXTRA-AXIAL SPACES:  Normal for the patient's age  VISUALIZED ORBITS AND PARANASAL SINUSES:  Unremarkable  CALVARIUM AND EXTRACRANIAL SOFT TISSUES:  Normal      Impression: No acute intracranial abnormality  Workstation performed: [id number]     Ct Spine Cervical Without Contrast    Result Date: 7/11/2018  Narrative: CT CERVICAL SPINE - WITHOUT CONTRAST INDICATION:   fall  COMPARISON: None  TECHNIQUE:  CT examination of the cervical spine was performed without intravenous contrast   Contiguous axial images were obtained    Sagittal and coronal reconstructions were performed  Radiation dose length product (DLP) for this visit:  505 31 mGy-cm   This examination, like all CT scans performed in the [facility], was performed utilizing techniques to minimize radiation dose exposure, including the use of iterative  reconstruction and automated exposure control  IMAGE QUALITY:  Diagnostic  FINDINGS: ALIGNMENT:  Normal alignment of the cervical spine  No subluxation  VERTEBRAL BODIES:  No fracture  DEGENERATIVE CHANGES:  Multilevel degenerative changes including multilevel neural foraminal and spinal canal narrowing  PREVERTEBRAL AND PARASPINAL SOFT TISSUES:  Unremarkable  THORACIC INLET:  Normal      Impression: 1  No acute fracture or dislocation  2   Degenerative changes  Workstation performed: [id number]       EKG, Pathology, and Other Studies Reviewed on Admission:   · EKG:  Reviewed    Allscripts / Epic Records Reviewed: Yes     ** Please Note: This note has been constructed using a voice recognition system   **

## 2018-07-12 NOTE — ASSESSMENT & PLAN NOTE
· Patient suffered mechanical fall from bed yesterday at [facility]  Recently admitted after cephalomedullary nail left proximal femur by Dr Odilia Pena 5/28/18    · Left hip hardware failure   · Appreciate ortho input, plan for OR tomorrow for removal of hardware left hip and conversion to long stem hip hemiarthroplasty  · NPO at midnight    · Geriatric pain order set   · Attending to risk stratify for preop clearance   · EKG, CXR, AM labs

## 2018-07-12 NOTE — PROGRESS NOTES
Orthopedics   Remona Stable 80 y o  female MRN: [id number]  Unit/Bed#: CW3 337-01      Diagnosis: cut out of L hip hardware     Procedure: conversion to hemiarthroplasty    Labs:    0  Lab Value Date/Time   HCT 32 0 (L) 07/12/2018 0441   HCT 33 4 (L) 07/11/2018 1713   HCT 24 3 (L) 06/02/2018 0515   HCT 25 7 (L) 06/01/2018 0459   HGB 9 8 (L) 07/12/2018 0441   HGB 10 5 (L) 07/11/2018 1713   HGB 8 1 (L) 06/02/2018 0515   HGB 8 0 (L) 06/01/2018 0459   INR 1 08 07/11/2018 1713   WBC 7 64 07/12/2018 0441   WBC 9 19 07/11/2018 1713   WBC 11 2 (H) 06/02/2018 0515   WBC 12 57 (H) 06/01/2018 0459   ESR 92 (H) 05/29/2018 1353       Meds:    Current Facility-Administered Medications:     albuterol inhalation solution 2 5 mg, 2 5 mg, Nebulization, Q4H PRN, Antionette Farnsworth MD    allopurinol (ZYLOPRIM) tablet 100 mg, 100 mg, Oral, Daily, Sudarshan Thakkar MD    aspirin tablet 325 mg, 325 mg, Oral, Daily, Sudarshan Thakkar MD    atenolol (TENORMIN) tablet 50 mg, 50 mg, Oral, Daily, Sudarshan Thakkar MD    budesonide-formoterol (SYMBICORT) 160-4 5 mcg/act inhaler 2 puff, 2 puff, Inhalation, BID, Sudarshan Frederick MD, 2 puff at 07/12/18 0811    enoxaparin (LOVENOX) subcutaneous injection 30 mg, 30 mg, Subcutaneous, Q24H [address], Sudarshan Thakkar MD    ipratropium (ATROVENT) 0 02 % inhalation solution 0 5 mg, 0 5 mg, Nebulization, TID, Antionette Farnsworth MD, 0 5 mg at 07/12/18 1334    [START ON 7/13/2018] lactated ringers infusion, 75 mL/hr, Intravenous, Continuous, MD Ann Hongbuterol [facility]) inhalation solution 1 25 mg, 1 25 mg, Nebulization, TID, Antionette Farnsworth MD, 1 25 mg at 07/12/18 1334    nystatin (MYCOSTATIN) powder 1 application, 1 application, Topical, BID, Sudarshan Frederick MD, 1 application at 19/16/93 0813    ondansetron (ZOFRAN) injection 4 mg, 4 mg, Intravenous, Q6H PRN, Sudarshan Thakkar MD    oxyCODONE (ROXICODONE) IR tablet 5 mg, 5 mg, Oral, Q4H PRN, Sudarshan Thakkar MD    pantoprazole (PROTONIX) EC tablet 40 mg, 40 mg, Oral, Early Morning, Sudarshan Morris MD, 40 mg at 07/12/18 0624    polyethylene glycol (MIRALAX) packet 17 g, 17 g, Oral, Daily, Sudarshan Thakkar MD    potassium chloride (K-DUR,KLOR-CON) CR tablet 10 mEq, 10 mEq, Oral, Every Other Day, Sudarshan Thakkar MD    potassium chloride (K-DUR,KLOR-CON) CR tablet 20 mEq, 20 mEq, Oral, Once, Marky Toth PA-C    QUEtiapine (SEROquel) tablet 12 5 mg, 12 5 mg, Oral, HS PRN, Sudarshan Thakkar MD    senna (SENOKOT) tablet 8 6 mg, 8 6 mg, Oral, Daily, Sudarshan Thakkar MD    silver sulfadiazine (SILVADENE,SSD) 1 % cream 1 application, 1 application, Topical, Daily PRN, Sudarshan Morris MD, 1 application at 15/00/69 0813    Blood Culture:   No results found for: BLOODCX    Wound Culture:   Lab Results   Component Value Date    WOUNDCULT 2+ Growth of Staphylococcus aureus (A) 02/15/2018       Ins and Outs:  I/O last 24 hours: In: 0   Out: 281 [Urine:281]          CXR: normal chest X-ray, on chart  EKG: on chart     Blood: 2 units type & screen and type and crossed 2 units    Abx: 2 grams Ancef on call for OR    NPO: at midnight    Anticoag:  On hold for OR    Consent: on chart    Clearance: cards

## 2018-07-12 NOTE — SOCIAL WORK
CM met with patient and attempted open, pt was very confused  CM placed called to pts son Amada Briscoe [phone], left message for call back

## 2018-07-12 NOTE — PHYSICAL THERAPY NOTE
PT Note  PT order noted, chart reviewed, pt is currently pend OR conversion L hip hemiarthroplasty; PT will f/u post op

## 2018-07-12 NOTE — ASSESSMENT & PLAN NOTE
· Normally on lasix 60mg BID - will hold AM dose since patient going to OR   · Continue atenolol   · No ACEI or ARB 2/2 CKD  · Appears slightly vol overload, will give 40mg IV lasix now  · CXR pending   · Monitor I/O, daily wts, low sodium diet

## 2018-07-12 NOTE — ASSESSMENT & PLAN NOTE
Likely mechanical  Ortho c/s  Admit to inpatient  recent hip surgery now with fall and possible displacement of hardware     Pain controlled

## 2018-07-13 ENCOUNTER — APPOINTMENT (INPATIENT)
Dept: NON INVASIVE DIAGNOSTICS | Facility: HOSPITAL | Age: 83
DRG: 470 | End: 2018-07-13
Payer: MEDICARE

## 2018-07-13 ENCOUNTER — ANESTHESIA (INPATIENT)
Dept: PERIOP | Facility: HOSPITAL | Age: 83
DRG: 470 | End: 2018-07-13
Payer: MEDICARE

## 2018-07-13 ENCOUNTER — APPOINTMENT (INPATIENT)
Dept: RADIOLOGY | Facility: HOSPITAL | Age: 83
DRG: 470 | End: 2018-07-13
Payer: MEDICARE

## 2018-07-13 LAB
ABO GROUP BLD: NORMAL
ANION GAP SERPL CALCULATED.3IONS-SCNC: 6 MMOL/L (ref 4–13)
APTT PPP: 32 SECONDS (ref 24–36)
BASE EXCESS BLDA CALC-SCNC: 6 MMOL/L (ref -2–3)
BASE EXCESS BLDA CALC-SCNC: 6 MMOL/L (ref -2–3)
BASE EXCESS BLDA CALC-SCNC: 9 MMOL/L (ref -2–3)
BASOPHILS # BLD AUTO: 0.03 THOUSANDS/ΜL (ref 0–0.1)
BASOPHILS NFR BLD AUTO: 1 % (ref 0–1)
BLD GP AB SCN SERPL QL: NEGATIVE
BUN SERPL-MCNC: 24 MG/DL (ref 5–25)
CA-I BLD-SCNC: 1.1 MMOL/L (ref 1.12–1.32)
CA-I BLD-SCNC: 1.11 MMOL/L (ref 1.12–1.32)
CA-I BLD-SCNC: 1.17 MMOL/L (ref 1.12–1.32)
CALCIUM SERPL-MCNC: 8.5 MG/DL (ref 8.3–10.1)
CHLORIDE SERPL-SCNC: 106 MMOL/L (ref 100–108)
CO2 SERPL-SCNC: 32 MMOL/L (ref 21–32)
CREAT SERPL-MCNC: 1.49 MG/DL (ref 0.6–1.3)
EOSINOPHIL # BLD AUTO: 0.59 THOUSAND/ΜL (ref 0–0.61)
EOSINOPHIL NFR BLD AUTO: 9 % (ref 0–6)
ERYTHROCYTE [DISTWIDTH] IN BLOOD BY AUTOMATED COUNT: 15.5 % (ref 11.6–15.1)
ERYTHROCYTE [DISTWIDTH] IN BLOOD BY AUTOMATED COUNT: 15.6 % (ref 11.6–15.1)
GFR SERPL CREATININE-BSD FRML MDRD: 32 ML/MIN/1.73SQ M
GLUCOSE SERPL-MCNC: 113 MG/DL (ref 65–140)
GLUCOSE SERPL-MCNC: 148 MG/DL (ref 65–140)
GLUCOSE SERPL-MCNC: 87 MG/DL (ref 65–140)
GLUCOSE SERPL-MCNC: 93 MG/DL (ref 65–140)
HCO3 BLDA-SCNC: 30.5 MMOL/L (ref 24–30)
HCO3 BLDA-SCNC: 30.8 MMOL/L (ref 22–28)
HCO3 BLDA-SCNC: 34.4 MMOL/L (ref 24–30)
HCT VFR BLD AUTO: 28.9 % (ref 34.8–46.1)
HCT VFR BLD AUTO: 30.6 % (ref 34.8–46.1)
HCT VFR BLD CALC: 24 % (ref 34.8–46.1)
HCT VFR BLD CALC: 25 % (ref 34.8–46.1)
HCT VFR BLD CALC: 28 % (ref 34.8–46.1)
HGB BLD-MCNC: 9 G/DL (ref 11.5–15.4)
HGB BLD-MCNC: 9.3 G/DL (ref 11.5–15.4)
HGB BLDA-MCNC: 8.2 G/DL (ref 11.5–15.4)
HGB BLDA-MCNC: 8.5 G/DL (ref 11.5–15.4)
HGB BLDA-MCNC: 9.5 G/DL (ref 11.5–15.4)
IMM GRANULOCYTES # BLD AUTO: 0.03 THOUSAND/UL (ref 0–0.2)
IMM GRANULOCYTES NFR BLD AUTO: 1 % (ref 0–2)
INR PPP: 1.15 (ref 0.86–1.17)
LYMPHOCYTES # BLD AUTO: 0.54 THOUSANDS/ΜL (ref 0.6–4.47)
LYMPHOCYTES NFR BLD AUTO: 8 % (ref 14–44)
MCH RBC QN AUTO: 31.1 PG (ref 26.8–34.3)
MCH RBC QN AUTO: 31.4 PG (ref 26.8–34.3)
MCHC RBC AUTO-ENTMCNC: 30.4 G/DL (ref 31.4–37.4)
MCHC RBC AUTO-ENTMCNC: 31.1 G/DL (ref 31.4–37.4)
MCV RBC AUTO: 101 FL (ref 82–98)
MCV RBC AUTO: 102 FL (ref 82–98)
MONOCYTES # BLD AUTO: 0.7 THOUSAND/ΜL (ref 0.17–1.22)
MONOCYTES NFR BLD AUTO: 11 % (ref 4–12)
NEUTROPHILS # BLD AUTO: 4.68 THOUSANDS/ΜL (ref 1.85–7.62)
NEUTS SEG NFR BLD AUTO: 70 % (ref 43–75)
NRBC BLD AUTO-RTO: 0 /100 WBCS
NT-PROBNP SERPL-MCNC: 5514 PG/ML
PCO2 BLD: 32 MMOL/L (ref 21–32)
PCO2 BLD: 32 MMOL/L (ref 21–32)
PCO2 BLD: 36 MMOL/L (ref 21–32)
PCO2 BLD: 45.2 MM HG (ref 36–44)
PCO2 BLD: 45.4 MM HG (ref 42–50)
PCO2 BLD: 50.8 MM HG (ref 42–50)
PH BLD: 7.43 [PH] (ref 7.3–7.4)
PH BLD: 7.44 [PH] (ref 7.35–7.45)
PH BLD: 7.44 [PH] (ref 7.3–7.4)
PLATELET # BLD AUTO: 264 THOUSANDS/UL (ref 149–390)
PLATELET # BLD AUTO: 268 THOUSANDS/UL (ref 149–390)
PMV BLD AUTO: 9.8 FL (ref 8.9–12.7)
PMV BLD AUTO: 9.8 FL (ref 8.9–12.7)
PO2 BLD: 213 MM HG (ref 35–45)
PO2 BLD: 67 MM HG (ref 35–45)
PO2 BLD: 68 MM HG (ref 75–129)
POTASSIUM BLD-SCNC: 3.4 MMOL/L (ref 3.5–5.3)
POTASSIUM BLD-SCNC: 3.4 MMOL/L (ref 3.5–5.3)
POTASSIUM BLD-SCNC: 3.8 MMOL/L (ref 3.5–5.3)
POTASSIUM SERPL-SCNC: 3.4 MMOL/L (ref 3.5–5.3)
PROTHROMBIN TIME: 14.8 SECONDS (ref 11.8–14.2)
RBC # BLD AUTO: 2.87 MILLION/UL (ref 3.81–5.12)
RBC # BLD AUTO: 2.99 MILLION/UL (ref 3.81–5.12)
RH BLD: POSITIVE
SAO2 % BLD FROM PO2: 100 % (ref 95–98)
SAO2 % BLD FROM PO2: 93 % (ref 95–98)
SAO2 % BLD FROM PO2: 94 % (ref 95–98)
SODIUM BLD-SCNC: 143 MMOL/L (ref 136–145)
SODIUM BLD-SCNC: 144 MMOL/L (ref 136–145)
SODIUM BLD-SCNC: 145 MMOL/L (ref 136–145)
SODIUM SERPL-SCNC: 144 MMOL/L (ref 136–145)
SPECIMEN EXPIRATION DATE: NORMAL
SPECIMEN SOURCE: ABNORMAL
WBC # BLD AUTO: 13.21 THOUSAND/UL (ref 4.31–10.16)
WBC # BLD AUTO: 6.57 THOUSAND/UL (ref 4.31–10.16)

## 2018-07-13 PROCEDURE — G8978 MOBILITY CURRENT STATUS: HCPCS

## 2018-07-13 PROCEDURE — 94760 N-INVAS EAR/PLS OXIMETRY 1: CPT

## 2018-07-13 PROCEDURE — C1776 JOINT DEVICE (IMPLANTABLE): HCPCS | Performed by: ORTHOPAEDIC SURGERY

## 2018-07-13 PROCEDURE — C1713 ANCHOR/SCREW BN/BN,TIS/BN: HCPCS | Performed by: ORTHOPAEDIC SURGERY

## 2018-07-13 PROCEDURE — 85014 HEMATOCRIT: CPT

## 2018-07-13 PROCEDURE — 85610 PROTHROMBIN TIME: CPT | Performed by: ORTHOPAEDIC SURGERY

## 2018-07-13 PROCEDURE — 84295 ASSAY OF SERUM SODIUM: CPT

## 2018-07-13 PROCEDURE — G8979 MOBILITY GOAL STATUS: HCPCS

## 2018-07-13 PROCEDURE — 94640 AIRWAY INHALATION TREATMENT: CPT

## 2018-07-13 PROCEDURE — 86900 BLOOD TYPING SEROLOGIC ABO: CPT | Performed by: ORTHOPAEDIC SURGERY

## 2018-07-13 PROCEDURE — 30233N1 TRANSFUSION OF NONAUTOLOGOUS RED BLOOD CELLS INTO PERIPHERAL VEIN, PERCUTANEOUS APPROACH: ICD-10-PCS | Performed by: ANESTHESIOLOGY

## 2018-07-13 PROCEDURE — 82330 ASSAY OF CALCIUM: CPT

## 2018-07-13 PROCEDURE — 99222 1ST HOSP IP/OBS MODERATE 55: CPT | Performed by: INTERNAL MEDICINE

## 2018-07-13 PROCEDURE — 84132 ASSAY OF SERUM POTASSIUM: CPT

## 2018-07-13 PROCEDURE — 85730 THROMBOPLASTIN TIME PARTIAL: CPT | Performed by: ORTHOPAEDIC SURGERY

## 2018-07-13 PROCEDURE — 93306 TTE W/DOPPLER COMPLETE: CPT | Performed by: INTERNAL MEDICINE

## 2018-07-13 PROCEDURE — 80048 BASIC METABOLIC PNL TOTAL CA: CPT | Performed by: ORTHOPAEDIC SURGERY

## 2018-07-13 PROCEDURE — 85025 COMPLETE CBC W/AUTO DIFF WBC: CPT | Performed by: ORTHOPAEDIC SURGERY

## 2018-07-13 PROCEDURE — 20680 REMOVAL OF IMPLANT DEEP: CPT | Performed by: ORTHOPAEDIC SURGERY

## 2018-07-13 PROCEDURE — 0SRS0JA REPLACEMENT OF LEFT HIP JOINT, FEMORAL SURFACE WITH SYNTHETIC SUBSTITUTE, UNCEMENTED, OPEN APPROACH: ICD-10-PCS | Performed by: ORTHOPAEDIC SURGERY

## 2018-07-13 PROCEDURE — 97163 PT EVAL HIGH COMPLEX 45 MIN: CPT

## 2018-07-13 PROCEDURE — 0QP704Z REMOVAL OF INTERNAL FIXATION DEVICE FROM LEFT UPPER FEMUR, OPEN APPROACH: ICD-10-PCS | Performed by: ORTHOPAEDIC SURGERY

## 2018-07-13 PROCEDURE — C1769 GUIDE WIRE: HCPCS | Performed by: ORTHOPAEDIC SURGERY

## 2018-07-13 PROCEDURE — 82947 ASSAY GLUCOSE BLOOD QUANT: CPT

## 2018-07-13 PROCEDURE — P9016 RBC LEUKOCYTES REDUCED: HCPCS

## 2018-07-13 PROCEDURE — 83880 ASSAY OF NATRIURETIC PEPTIDE: CPT | Performed by: HOSPITALIST

## 2018-07-13 PROCEDURE — 86850 RBC ANTIBODY SCREEN: CPT | Performed by: ORTHOPAEDIC SURGERY

## 2018-07-13 PROCEDURE — 99232 SBSQ HOSP IP/OBS MODERATE 35: CPT | Performed by: PHYSICIAN ASSISTANT

## 2018-07-13 PROCEDURE — 93306 TTE W/DOPPLER COMPLETE: CPT

## 2018-07-13 PROCEDURE — 85027 COMPLETE CBC AUTOMATED: CPT | Performed by: ANESTHESIOLOGY

## 2018-07-13 PROCEDURE — 27236 TREAT THIGH FRACTURE: CPT | Performed by: ORTHOPAEDIC SURGERY

## 2018-07-13 PROCEDURE — 86901 BLOOD TYPING SEROLOGIC RH(D): CPT | Performed by: ORTHOPAEDIC SURGERY

## 2018-07-13 PROCEDURE — 73501 X-RAY EXAM HIP UNI 1 VIEW: CPT

## 2018-07-13 PROCEDURE — 99024 POSTOP FOLLOW-UP VISIT: CPT | Performed by: PHYSICIAN ASSISTANT

## 2018-07-13 PROCEDURE — 82803 BLOOD GASES ANY COMBINATION: CPT

## 2018-07-13 DEVICE — CORAIL HIP SYSTEM CEMENTLESS FEMORAL STEM HA COATED REVISION 12/14 AMT 135 DEGREES STANDARD COLLARED SIZE 13
Type: IMPLANTABLE DEVICE | Site: HIP | Status: FUNCTIONAL
Brand: CORAIL

## 2018-07-13 DEVICE — SELF CENTERING BI-POLAR HEAD 28MM ID 45MM OD
Type: IMPLANTABLE DEVICE | Site: HIP | Status: FUNCTIONAL
Brand: SELF CENTERING

## 2018-07-13 DEVICE — 1.7MM COCR CABLE WITH TI CRIMP 750MM-STERILE: Type: IMPLANTABLE DEVICE | Site: FEMUR | Status: FUNCTIONAL

## 2018-07-13 DEVICE — ARTICUL/EZE FEMORAL HEAD DIAMETER 28MM +1.5 12/14 TAPER
Type: IMPLANTABLE DEVICE | Site: HIP | Status: FUNCTIONAL
Brand: ARTICUL/EZE

## 2018-07-13 RX ORDER — FENTANYL CITRATE 50 UG/ML
INJECTION, SOLUTION INTRAMUSCULAR; INTRAVENOUS AS NEEDED
Status: DISCONTINUED | OUTPATIENT
Start: 2018-07-13 | End: 2018-07-13 | Stop reason: SURG

## 2018-07-13 RX ORDER — METOPROLOL TARTRATE 5 MG/5ML
INJECTION INTRAVENOUS AS NEEDED
Status: DISCONTINUED | OUTPATIENT
Start: 2018-07-13 | End: 2018-07-13 | Stop reason: SURG

## 2018-07-13 RX ORDER — MAGNESIUM HYDROXIDE/ALUMINUM HYDROXICE/SIMETHICONE 120; 1200; 1200 MG/30ML; MG/30ML; MG/30ML
15 SUSPENSION ORAL EVERY 6 HOURS PRN
Status: DISCONTINUED | OUTPATIENT
Start: 2018-07-13 | End: 2018-07-18 | Stop reason: HOSPADM

## 2018-07-13 RX ORDER — LIDOCAINE HYDROCHLORIDE 10 MG/ML
INJECTION, SOLUTION INFILTRATION; PERINEURAL AS NEEDED
Status: DISCONTINUED | OUTPATIENT
Start: 2018-07-13 | End: 2018-07-13 | Stop reason: SURG

## 2018-07-13 RX ORDER — SODIUM CHLORIDE, SODIUM LACTATE, POTASSIUM CHLORIDE, CALCIUM CHLORIDE 600; 310; 30; 20 MG/100ML; MG/100ML; MG/100ML; MG/100ML
100 INJECTION, SOLUTION INTRAVENOUS CONTINUOUS
Status: DISCONTINUED | OUTPATIENT
Start: 2018-07-13 | End: 2018-07-13

## 2018-07-13 RX ORDER — OXYCODONE HYDROCHLORIDE 5 MG/1
TABLET ORAL
Qty: 30 TABLET | Refills: 0 | Status: SHIPPED | OUTPATIENT
Start: 2018-07-13 | End: 2018-07-18

## 2018-07-13 RX ORDER — ROCURONIUM BROMIDE 10 MG/ML
INJECTION, SOLUTION INTRAVENOUS AS NEEDED
Status: DISCONTINUED | OUTPATIENT
Start: 2018-07-13 | End: 2018-07-13 | Stop reason: SURG

## 2018-07-13 RX ORDER — ESMOLOL HYDROCHLORIDE 10 MG/ML
INJECTION INTRAVENOUS AS NEEDED
Status: DISCONTINUED | OUTPATIENT
Start: 2018-07-13 | End: 2018-07-13 | Stop reason: SURG

## 2018-07-13 RX ORDER — MAGNESIUM HYDROXIDE 1200 MG/15ML
LIQUID ORAL AS NEEDED
Status: DISCONTINUED | OUTPATIENT
Start: 2018-07-13 | End: 2018-07-13 | Stop reason: HOSPADM

## 2018-07-13 RX ORDER — ACETAMINOPHEN 325 MG/1
650 TABLET ORAL EVERY 6 HOURS PRN
Status: DISCONTINUED | OUTPATIENT
Start: 2018-07-13 | End: 2018-07-14

## 2018-07-13 RX ORDER — SODIUM CHLORIDE 9 MG/ML
INJECTION, SOLUTION INTRAVENOUS CONTINUOUS PRN
Status: DISCONTINUED | OUTPATIENT
Start: 2018-07-13 | End: 2018-07-13 | Stop reason: SURG

## 2018-07-13 RX ORDER — SENNOSIDES 8.6 MG
1 TABLET ORAL DAILY
Status: DISCONTINUED | OUTPATIENT
Start: 2018-07-13 | End: 2018-07-13 | Stop reason: SDUPTHER

## 2018-07-13 RX ORDER — GLYCOPYRROLATE 0.2 MG/ML
INJECTION INTRAMUSCULAR; INTRAVENOUS AS NEEDED
Status: DISCONTINUED | OUTPATIENT
Start: 2018-07-13 | End: 2018-07-13 | Stop reason: SURG

## 2018-07-13 RX ORDER — SODIUM CHLORIDE, SODIUM LACTATE, POTASSIUM CHLORIDE, CALCIUM CHLORIDE 600; 310; 30; 20 MG/100ML; MG/100ML; MG/100ML; MG/100ML
INJECTION, SOLUTION INTRAVENOUS CONTINUOUS PRN
Status: DISCONTINUED | OUTPATIENT
Start: 2018-07-13 | End: 2018-07-13 | Stop reason: SURG

## 2018-07-13 RX ORDER — PROPOFOL 10 MG/ML
INJECTION, EMULSION INTRAVENOUS AS NEEDED
Status: DISCONTINUED | OUTPATIENT
Start: 2018-07-13 | End: 2018-07-13 | Stop reason: SURG

## 2018-07-13 RX ORDER — FENTANYL CITRATE/PF 50 MCG/ML
25 SYRINGE (ML) INJECTION
Status: DISCONTINUED | OUTPATIENT
Start: 2018-07-13 | End: 2018-07-13 | Stop reason: HOSPADM

## 2018-07-13 RX ORDER — DOCUSATE SODIUM 100 MG/1
100 CAPSULE, LIQUID FILLED ORAL 2 TIMES DAILY
Status: DISCONTINUED | OUTPATIENT
Start: 2018-07-13 | End: 2018-07-18 | Stop reason: HOSPADM

## 2018-07-13 RX ORDER — ALBUMIN, HUMAN INJ 5% 5 %
SOLUTION INTRAVENOUS CONTINUOUS PRN
Status: DISCONTINUED | OUTPATIENT
Start: 2018-07-13 | End: 2018-07-13 | Stop reason: SURG

## 2018-07-13 RX ORDER — ONDANSETRON 2 MG/ML
4 INJECTION INTRAMUSCULAR; INTRAVENOUS EVERY 6 HOURS PRN
Status: DISCONTINUED | OUTPATIENT
Start: 2018-07-13 | End: 2018-07-13

## 2018-07-13 RX ORDER — LABETALOL HYDROCHLORIDE 5 MG/ML
INJECTION, SOLUTION INTRAVENOUS AS NEEDED
Status: DISCONTINUED | OUTPATIENT
Start: 2018-07-13 | End: 2018-07-13 | Stop reason: SURG

## 2018-07-13 RX ORDER — CALCIUM CARBONATE 200(500)MG
1000 TABLET,CHEWABLE ORAL DAILY PRN
Status: DISCONTINUED | OUTPATIENT
Start: 2018-07-13 | End: 2018-07-18 | Stop reason: HOSPADM

## 2018-07-13 RX ORDER — TRAMADOL HYDROCHLORIDE 50 MG/1
50 TABLET ORAL EVERY 6 HOURS PRN
Status: DISCONTINUED | OUTPATIENT
Start: 2018-07-13 | End: 2018-07-18 | Stop reason: HOSPADM

## 2018-07-13 RX ORDER — CEFAZOLIN SODIUM 1 G/3ML
INJECTION, POWDER, FOR SOLUTION INTRAMUSCULAR; INTRAVENOUS AS NEEDED
Status: DISCONTINUED | OUTPATIENT
Start: 2018-07-13 | End: 2018-07-13 | Stop reason: SURG

## 2018-07-13 RX ORDER — SODIUM CHLORIDE 9 MG/ML
50 INJECTION, SOLUTION INTRAVENOUS CONTINUOUS
Status: DISCONTINUED | OUTPATIENT
Start: 2018-07-13 | End: 2018-07-13

## 2018-07-13 RX ORDER — ONDANSETRON 2 MG/ML
4 INJECTION INTRAMUSCULAR; INTRAVENOUS ONCE AS NEEDED
Status: DISCONTINUED | OUTPATIENT
Start: 2018-07-13 | End: 2018-07-13 | Stop reason: HOSPADM

## 2018-07-13 RX ORDER — TRANEXAMIC ACID 100 MG/ML
INJECTION, SOLUTION INTRAVENOUS AS NEEDED
Status: DISCONTINUED | OUTPATIENT
Start: 2018-07-13 | End: 2018-07-13 | Stop reason: SURG

## 2018-07-13 RX ORDER — SENNOSIDES 8.6 MG
650 CAPSULE ORAL EVERY 8 HOURS PRN
Qty: 30 TABLET | Refills: 0 | Status: SHIPPED | OUTPATIENT
Start: 2018-07-13 | End: 2018-07-18 | Stop reason: HOSPADM

## 2018-07-13 RX ORDER — ONDANSETRON 2 MG/ML
INJECTION INTRAMUSCULAR; INTRAVENOUS AS NEEDED
Status: DISCONTINUED | OUTPATIENT
Start: 2018-07-13 | End: 2018-07-13 | Stop reason: SURG

## 2018-07-13 RX ADMIN — FENTANYL CITRATE 25 MCG: 50 INJECTION, SOLUTION INTRAMUSCULAR; INTRAVENOUS at 11:07

## 2018-07-13 RX ADMIN — NEOSTIGMINE METHYLSULFATE 3 MG: 1 INJECTION, SOLUTION INTRAMUSCULAR; INTRAVENOUS; SUBCUTANEOUS at 11:27

## 2018-07-13 RX ADMIN — FENTANYL CITRATE 25 MCG: 50 INJECTION, SOLUTION INTRAMUSCULAR; INTRAVENOUS at 08:15

## 2018-07-13 RX ADMIN — PROPOFOL 120 MG: 10 INJECTION, EMULSION INTRAVENOUS at 08:16

## 2018-07-13 RX ADMIN — PANTOPRAZOLE SODIUM 40 MG: 40 TABLET, DELAYED RELEASE ORAL at 05:38

## 2018-07-13 RX ADMIN — LEVALBUTEROL HYDROCHLORIDE 1.25 MG: 1.25 SOLUTION, CONCENTRATE RESPIRATORY (INHALATION) at 21:33

## 2018-07-13 RX ADMIN — METOPROLOL TARTRATE 2.5 MG: 1 INJECTION, SOLUTION INTRAVENOUS at 11:05

## 2018-07-13 RX ADMIN — ROCURONIUM BROMIDE 10 MG: 10 INJECTION INTRAVENOUS at 10:25

## 2018-07-13 RX ADMIN — ALBUMIN (HUMAN): 12.5 SOLUTION INTRAVENOUS at 09:05

## 2018-07-13 RX ADMIN — ROCURONIUM BROMIDE 50 MG: 10 INJECTION INTRAVENOUS at 08:17

## 2018-07-13 RX ADMIN — FENTANYL CITRATE 50 MCG: 50 INJECTION, SOLUTION INTRAMUSCULAR; INTRAVENOUS at 08:22

## 2018-07-13 RX ADMIN — CEFAZOLIN 2000 MG: 1 INJECTION, POWDER, FOR SOLUTION INTRAVENOUS at 08:48

## 2018-07-13 RX ADMIN — DOCUSATE SODIUM 100 MG: 100 CAPSULE, LIQUID FILLED ORAL at 17:58

## 2018-07-13 RX ADMIN — METOPROLOL TARTRATE 2.5 MG: 1 INJECTION, SOLUTION INTRAVENOUS at 09:23

## 2018-07-13 RX ADMIN — SODIUM CHLORIDE 50 ML/HR: 0.9 INJECTION, SOLUTION INTRAVENOUS at 13:01

## 2018-07-13 RX ADMIN — LIDOCAINE HYDROCHLORIDE 50 MG: 10 INJECTION, SOLUTION INFILTRATION; PERINEURAL at 08:15

## 2018-07-13 RX ADMIN — SODIUM CHLORIDE: 0.9 INJECTION, SOLUTION INTRAVENOUS at 08:20

## 2018-07-13 RX ADMIN — IPRATROPIUM BROMIDE 0.5 MG: 0.5 SOLUTION RESPIRATORY (INHALATION) at 21:33

## 2018-07-13 RX ADMIN — CEFAZOLIN SODIUM 1000 MG: 1 SOLUTION INTRAVENOUS at 17:27

## 2018-07-13 RX ADMIN — ESMOLOL HYDROCHLORIDE 20 MG: 100 INJECTION, SOLUTION INTRAVENOUS at 08:31

## 2018-07-13 RX ADMIN — IPRATROPIUM BROMIDE 0.5 MG: 0.5 SOLUTION RESPIRATORY (INHALATION) at 07:38

## 2018-07-13 RX ADMIN — FENTANYL CITRATE 25 MCG: 50 INJECTION, SOLUTION INTRAMUSCULAR; INTRAVENOUS at 12:39

## 2018-07-13 RX ADMIN — NYSTATIN 1 APPLICATION: 100000 POWDER TOPICAL at 17:59

## 2018-07-13 RX ADMIN — DEXAMETHASONE SODIUM PHOSPHATE 5 MG: 10 INJECTION INTRAMUSCULAR; INTRAVENOUS at 08:43

## 2018-07-13 RX ADMIN — LEVALBUTEROL HYDROCHLORIDE 1.25 MG: 1.25 SOLUTION, CONCENTRATE RESPIRATORY (INHALATION) at 15:33

## 2018-07-13 RX ADMIN — ONDANSETRON 4 MG: 2 INJECTION INTRAMUSCULAR; INTRAVENOUS at 08:43

## 2018-07-13 RX ADMIN — FUROSEMIDE 60 MG: 40 TABLET ORAL at 15:49

## 2018-07-13 RX ADMIN — BUDESONIDE AND FORMOTEROL FUMARATE DIHYDRATE 2 PUFF: 160; 4.5 AEROSOL RESPIRATORY (INHALATION) at 17:59

## 2018-07-13 RX ADMIN — TRANEXAMIC ACID 1.5 G: 100 INJECTION, SOLUTION INTRAVENOUS at 08:59

## 2018-07-13 RX ADMIN — IPRATROPIUM BROMIDE 0.5 MG: 0.5 SOLUTION RESPIRATORY (INHALATION) at 15:33

## 2018-07-13 RX ADMIN — GLYCOPYRROLATE 0.4 MG: 0.2 INJECTION, SOLUTION INTRAMUSCULAR; INTRAVENOUS at 11:27

## 2018-07-13 RX ADMIN — LABETALOL 20 MG/4 ML (5 MG/ML) INTRAVENOUS SYRINGE 15 MG: at 11:44

## 2018-07-13 RX ADMIN — ACETAMINOPHEN 650 MG: 325 TABLET, FILM COATED ORAL at 17:58

## 2018-07-13 RX ADMIN — LEVALBUTEROL HYDROCHLORIDE 1.25 MG: 1.25 SOLUTION, CONCENTRATE RESPIRATORY (INHALATION) at 07:38

## 2018-07-13 RX ADMIN — PROPOFOL 30 MG: 10 INJECTION, EMULSION INTRAVENOUS at 08:21

## 2018-07-13 RX ADMIN — SODIUM CHLORIDE, SODIUM LACTATE, POTASSIUM CHLORIDE, AND CALCIUM CHLORIDE: .6; .31; .03; .02 INJECTION, SOLUTION INTRAVENOUS at 08:12

## 2018-07-13 NOTE — DISCHARGE INSTRUCTIONS
Discharge Instructions - Orthopedics  Shelia Holt 80 y o  female MRN: [id number]  Unit/Bed#: PACU 03    Weight Bearing Status:                                           Weight Bearing as tolerated to left leg  Be sure to maintain Hip Precautions (no bending at waist, no crossing legs, on internally rotating surgical leg)    DVT prophylaxis:  Complete course of Lovenox as directed    Pain:  Continue analgesics as directed    Showering Instructions:   Do not shower until followup     Dressing Instructions:   Keep dressing clean, dry and intact until follow up appointment  Driving Instructions:  No driving until cleared by Orthopaedic Surgery  PT/OT:  Continue PT/OT on outpatient basis as directed    Appt Instructions: If you do not have your appointment, please call the clinic at [phone]  Otherwise followup as scheduled below:  Dr Kostas Jones in 2 week    Contact the office sooner if you experience any increased numbness/tingling in the extremities        Miscellaneous:  None

## 2018-07-13 NOTE — PROGRESS NOTES
Progress Note - Miguelangel Comp 11/23/1931, 80 y o  female MRN: [id number]  Unit/Bed#: CW3 337-01 Encounter: [id number] DOS: 7/13/18  Primary Care Provider: Reji Gamez   Date and time admitted to hospital: 7/12/2018 12:44 AM    Ambulatory dysfunction   Assessment & Plan    · Patient suffered mechanical fall from bed at [facility]  Recently admitted after cephalomedullary nail left proximal femur by Dr Audrey Lerma 5/28/18  · Left hip hardware failure   · Appreciate ortho input, POD#0 for removal of hardware left hip and conversion to long stem hip hemiarthroplasty  · Geriatric pain order set         Chronic diastolic congestive heart failure (HCC)   Assessment & Plan    · Normally on lasix 60mg BID - resume tonight   · Continue atenolol   · No ACEI or ARB 2/2 CKD  · S/p extra dose of IV lasix 40mg yesterday, appears clinically volume overloaded   · Appreciate cards input, may need further diuresis post op - discontinue post op fluids   · CXR with mild vascular congestion, bnp >5000   · Monitor I/O, daily wts, low sodium diet         Stage 4 chronic kidney disease (HCC)   Assessment & Plan    · Baseline creatinine 2-2 5   · Stable, monitor labs         Oropharyngeal dysphagia   Assessment & Plan    · Continue dysphagia pureed with HTL        Atrial fibrillation   Assessment & Plan    · Permanent afib  Seen by cardiology last admission prior to OR     · Rate controlled on tele, not on AC due to history of ICH in the past        Alzheimer disease   Assessment & Plan    · Alert but oriented to self only  · Regular sleep/wake cycles  · High risk for delirium  · Seroquel ordered for bedtime as needed         Chronic bronchitis ([address] )   Assessment & Plan    · Daughter states this is new within last couple months with recurrent bronchitis and wheezing   · continue on symbicort, and ATC nebs           VTE Pharmacologic Prophylaxis:   Pharmacologic: Enoxaparin (Lovenox)  Mechanical VTE Prophylaxis in Place: Yes    Patient Centered Rounds: I have performed bedside rounds with nursing staff today  Discussions with Specialists or Other Care Team Provider: nursing, cm     Education and Discussions with Family / Patient: patient, call dtr     Time Spent for Care: 30 minutes  More than 50% of total time spent on counseling and coordination of care as described above  Current Length of Stay: 1 day(s)    Current Patient Status: Inpatient   Certification Statement: The patient will continue to require additional inpatient hospital stay due to patient is post op day 0, need PT OT evals and clsoe monitoring  in post op period for volume overload, acute blood loss anemia, infection  safe discharge planning  Discharge Plan / Estimated Discharge Date: needs PT OT, likely back to rehab     Code Status: Level 1 - Full Code    Subjective:   Pt seen and examined at bedside after surgery  No complaints  Oriented x 2 and responds to v erbal stimuli, otherwise lethargic  Objective:     Vitals:   Temp (24hrs), Av 9 °F (36 6 °C), Min:97 °F (36 1 °C), Max:98 8 °F (37 1 °C)    HR:  [] 89  Resp:  [19-24] 19  BP: (115-167)/(76-92) 145/92  SpO2:  [94 %-99 %] 97 %  Body mass index is 35 4 kg/m²  Input and Output Summary (last 24 hours): Intake/Output Summary (Last 24 hours) at 18 1409  Last data filed at 18 1259   Gross per 24 hour   Intake             2700 ml   Output             2606 ml   Net               94 ml       Physical Exam:     Physical Exam   Constitutional: She appears well-developed and well-nourished  HENT:   Head: Normocephalic and atraumatic  MM dry   Eyes: EOM are normal  No scleral icterus  Neck: Normal range of motion  Neck supple  Cardiovascular: Normal rate and normal heart sounds  No murmur heard  Irregularly irregular    Pulmonary/Chest: Effort normal  She has wheezes  Abdominal: Soft  Bowel sounds are normal    Musculoskeletal: Normal range of motion  She exhibits edema     Neurological: She is alert  Oriented to self and location, lethargic but responds to verbal stimuli    Skin: Skin is warm and dry  Left lateral thigh incision c/d/i   Psychiatric: She has a normal mood and affect  Dementia, now with lethargy    Nursing note and vitals reviewed  Additional Data:     Labs:      Results from last 7 days  Lab Units 07/13/18  1216  07/13/18  0536   WBC Thousand/uL 13 21*  --  6 57   HEMOGLOBIN g/dL 9 3*  --  9 0*   I STAT HEMOGLOBIN   --   < >  --    HEMATOCRIT % 30 6*  --  28 9*   PLATELETS Thousands/uL 264  --  268   NEUTROS PCT %  --   --  70   LYMPHS PCT %  --   --  8*   MONOS PCT %  --   --  11   EOS PCT %  --   --  9*   < > = values in this interval not displayed  Results from last 7 days  Lab Units 07/13/18  1029  07/13/18  0536  07/11/18  1713   SODIUM mmol/L  --   --  144  < > 143   POTASSIUM mmol/L  --   --  3 4*  < > 4 2   CHLORIDE mmol/L  --   --  106  < > 103   CO2 mmol/L  --   --  32  < > 32   BUN mg/dL  --   --  24  < > 29*   CREATININE mg/dL  --   --  1 49*  < > 1 53*   CALCIUM mg/dL  --   --  8 5  < > 8 8   TOTAL PROTEIN g/dL  --   --   --   --  7 1   BILIRUBIN TOTAL mg/dL  --   --   --   --  0 50   ALK PHOS U/L  --   --   --   --  157*   ALT U/L  --   --   --   --  18   AST U/L  --   --   --   --  34   GLUCOSE RANDOM mg/dL  --   --  87  < > 107   GLUCOSE, ISTAT mg/dl 148*  < >  --   --   --    < > = values in this interval not displayed  Results from last 7 days  Lab Units 07/13/18  0536   INR  1 15       * I Have Reviewed All Lab Data Listed Above  * Additional Pertinent Lab Tests Reviewed:  [address] Admission Reviewed    Imaging:    Imaging Reports Reviewed Today Include: all   Imaging Personally Reviewed by Myself Includes:  None     Recent Cultures (last 7 days):           Last 24 Hours Medication List:     Current Facility-Administered Medications:  acetaminophen 650 mg Oral Q6H PRN Mark Anthony Mercado PA-C   albuterol 2 5 mg Nebulization Q4H PRN Yuliya Garcia MD   allopurinol 100 mg Oral Daily Biljosseline Thakkar MD   aluminum-magnesium hydroxide-simethicone 15 mL Oral Q6H PRN Mark Anthony Mercado PA-C   aspirin 325 mg Oral Daily Sudarshan Thakkar MD   atenolol 50 mg Oral Daily Sudarshan Thakkar MD   budesonide-formoterol 2 puff Inhalation BID Sudarshan Thakkar MD   calcium carbonate 1,000 mg Oral Daily PRN Mark Anthony Mercado PA-C   cefazolin 1,000 mg Intravenous Q8H Mark Anthony Mercado PA-C   docusate sodium 100 mg Oral BID Mark Anthony Mercado PA-C   enoxaparin 30 mg Subcutaneous Q24H [address] Bilal A  Brianna Flores MD   furosemide 60 mg Oral BID (diuretic) Marky Toth PA-C   ipratropium 0 5 mg Nebulization TID Antionette Farnsworth MD   levalbuterol 1 25 mg Nebulization TID Yuliya Garcia MD   nystatin 1 application Topical BID Bilal VIVIANA Thakkar MD   ondansetron 4 mg Intravenous Q6H PRN Bilal VIVIANA Thakkar MD   ondansetron 4 mg Intravenous Q6H PRN Mark Anthony Mercado PA-C   oxyCODONE 5 mg Oral Q4H PRN Bilal VIVIANA Thakkar MD   pantoprazole 40 mg Oral Early Morning Biljosseline Thakkar MD   polyethylene glycol 17 g Oral Daily Bilal VIVIANA Flores MD   potassium chloride 10 mEq Oral Every Other Day Bilal A  Brianna Flores MD   potassium chloride 20 mEq Oral Once Marky Toth PA-C   QUEtiapine 12 5 mg Oral HS PRN Bilal VIVIANA Thakkar MD   senna 8 6 mg Oral Daily Bilal VIVIANA Thakkar MD   senna 1 tablet Oral Daily Mark Anthony Mercado PA-C   silver sulfadiazine 1 application Topical Daily PRN Bilal VIVIANA Flores MD   traMADol 50 mg Oral Q6H PRN Mark Anthony Mercado PA-C        Today, Patient Was Seen By: Amberly Faust PA-C    ** Please Note: This note has been constructed using a voice recognition system   **

## 2018-07-13 NOTE — PHYSICAL THERAPY NOTE
Physical Therapy Evaluation     Patient's Name: Julien Malloy    Admitting Diagnosis  Left hip pain [M25 552]    Problem List  Patient Active Problem List   Diagnosis    Subdural hemorrhage    Alzheimer disease    Chronic diastolic congestive heart failure (HCC)    HTN (hypertension)    Atrial fibrillation    Stage 4 chronic kidney disease (HCC)    Coagulopathy    Intraparenchymal hematoma of brain due to trauma   [facility] (subarachnoid hemorrhage)    Fall from bed    [facility] fracture of [facility]    Displaced intertrochanteric fracture of left femur, initial encounter for closed fracture ([facility] )    Acute blood loss anemia    Oropharyngeal dysphagia    Left hip pain    Ambulatory dysfunction    Chronic bronchitis ([facility] )       Past Medical History  Past Medical History:   Diagnosis Date    A-fib ([facility] )     Alzheimer's dementia     CHF (congestive heart failure) ([facility] )     Gout     Hypertension     Renal disorder        Past Surgical History  Past Surgical History:   Procedure Laterality Date    IN OPEN RX FEMUR FX+INTRAMED DUSTIN Left 5/28/2018    Procedure: INSERTION NAIL IM FEMUR ANTEGRADE (TROCHANTERIC);   Surgeon: Anthony Salter MD;  [facility]: [facility];  Service: Orthopedics        07/13/18 0105   Note Type   Note type Eval only   Pain Assessment   Pain Assessment FLACC   Pain Score ("a little bit of pain")   Pain Rating: FLACC (Rest) - Face 0   Pain Rating: FLACC (Rest) - Legs 0   Pain Rating: FLACC (Rest) - Activity 0   Pain Rating: FLACC (Rest) - Cry 0   Pain Rating: FLACC (Rest) - Consolability 0   Score: FLACC (Rest) 0   Pain Rating: FLACC (Activity) - Face 1   Pain Rating: FLACC (Activity) - Legs 1   Pain Rating: FLACC (Activity) - Activity 1   Pain Rating: FLACC (Activity) - Cry 1   Pain Rating: FLACC (Activity) - Consolability 1   Score: FLACC (Activity) 5   Home Living   Type of Home Assisted living  (as per H&P)   Prior Function   Level of [place] Needs assistance with ADLs and functional mobility; Needs assistance with IADLs  (Case management follow-up for full level of A PTA)   Lives With Facility staff   ADL Assistance Needs assistance   IADLs Needs assistance   Falls in the last 6 months 1 to 4   Restrictions/Precautions   Weight Bearing Precautions Per Order Yes   RLE Weight Bearing Per Order WBAT   LLE Weight Bearing Per Order WBAT   Other Precautions Cognitive; Chair Alarm; Bed Alarm; Fall Risk;Multiple lines;WBS; Restraints  ( hand mitts )   General   Additional Pertinent History Activity order post-op day #1, confirmed with ortho resident for activity on post-op day #0   Family/Caregiver Present No   Cognition   Overall Cognitive Status Impaired   Arousal/Participation Alert   Orientation Level Oriented to person   Memory Decreased long term memory;Decreased recall of biographical information;Decreased short term memory;Decreased recall of recent events;Decreased recall of precautions   Following Commands Follows one step commands with increased time or repetition   RUE Assessment   RUE Assessment WFL   LUE Assessment   LUE Assessment WFL   RLE Assessment   RLE Assessment WFL  (Not able to formally assess: >/=3+/5)   LLE Assessment   LLE Assessment WFL  (Not able to formally assess: >/=3+/5)   Coordination   Movements are Fluid and Coordinated 1   Bed Mobility   Supine to Sit 3  Moderate assistance   Additional items Assist x 2;HOB elevated;Verbal cues;LE management; Increased time required   Sit to Supine 3  Moderate assistance   Additional items Assist x 2;Verbal cues;LE management   Transfers   Sit to Stand 3  Moderate assistance   Additional items Assist x 2;Verbal cues; Increased time required  (Mod A x1, min A x1)   Stand to Sit 3  Moderate assistance   Additional items Assist x 2;Verbal cues; Increased time required  (Mod A x 1, min A x 1)   Ambulation/Elevation   Gait pattern Not tested  (Limited by pain)   Balance   Static Sitting Fair +   Dynamic Sitting Fair   Static Standing Fair   Dynamic Standing Poor +   Endurance Deficit   Endurance Deficit No   Activity Tolerance   Activity Tolerance Patient limited by pain   Nurse Made Aware Yes, RN cleared PT for eval   Assessment   Prognosis Good   Problem List Decreased strength; Impaired balance;Decreased mobility; Decreased cognition; Impaired judgement;Decreased safety awareness;Orthopedic restrictions;Pain  (Not able to demonstrate understanding of hip precautions)   Assessment Pt is 80 y o  female seen for PT evaluation s/p admit to [facility] on 7/12/2018 w/ Fall from bed  PT consulted to assess pt's functional mobility and d/c needs  Order placed for PT eval and tx, w/ up w/ A order  Comorbidities affecting pt's physical performance at time of assessment include: Subdural hemorrhage, alzheimer disease, chronic diastolic heart failure, HTN, atrial fibrillation, stage 4 chronic kidney disease, coagulopathy, intraparenchymal hematoma of the brain, subarachnoid hemorrhage, displaced intertrochanteric fracture of left femur  PTA, pt was residing at an [facility] and prior level of function needs clarification from case management  Personal factors affecting pt at time of IE include: inability to ambulate household distances, decreased cognition, positive fall history, limited insight into impairments, inability to perform IADLs and inability to perform ADLs  Please find objective findings from PT assessment regarding body systems outlined above with impairments and limitations including weakness, decreased ROM, impaired balance, gait deviations, pain, decreased activity tolerance, decreased functional mobility tolerance, decreased safety awareness, impaired judgement and fall risk  The following objective measures performed on IE also reveal limitations: Barthel Index: 25/100   Pt's clinical presentation is currently unstable/unpredictable seen in pt's presentation of Weight bearing status- BLE WBAT, posterior hip precautions, abnormal lab values, advanced dementia  Pt to benefit from continued PT tx to address deficits as defined above and maximize level of functional independent mobility and consistency  From PT/mobility standpoint, recommendation at time of d/c would be short term skilled rehab pending progress in order to facilitate return to PLOF  Goals   Patient Goals Not stated   STG Expiration Date 07/23/18   Short Term Goal #1 Pt will demonstrate: 1) supine to sit min A x1 with vc and tactile cues for hip precautions 2) STS with min A x1 for safe transfers 3) Amb 40' with rw and min A x1 for functional mobility 4) increase dynamic standing balance to fair+ for decreased risk of falls   Plan   Treatment/Interventions Functional transfer training;ADL retraining;LE strengthening/ROM; Therapeutic exercise; Endurance training;Equipment eval/education; Bed mobility;Gait training;Spoke to nursing   PT Frequency (5-7x/wk)   Recommendation   Recommendation (Rehab, d/c to home half-way with rehab if available)   Equipment Recommended Walker   PT - OK to Discharge Yes   Additional Comments When medically stable   Barthel Index   Feeding 5   Bathing 0   Grooming Score 0   Dressing Score 0   Bladder Score 5   Bowels Score 5   Toilet Use Score 5   Transfers (Bed/Chair) Score 5   Mobility (Level Surface) Score 0   Stairs Score 0   Barthel Index Score 25         Wanda [place], PT, DPT

## 2018-07-13 NOTE — PHYSICAL THERAPY NOTE
PT SCREEN    PT CONSULT RECEIVED  CHART REVIEW PERFORMED  PATIENT IN OR FOR HEMIARTHROPLASTY HIP (BIPOLAR) POSTERIOR; REMOVAL HARDWARE HIP  WILL SIGN OFF  PLEASE CONSULT POST OPERATIVELY      Katarina Alcantar, PT

## 2018-07-13 NOTE — PLAN OF CARE
Problem: PHYSICAL THERAPY ADULT  Goal: Performs mobility at highest level of function for planned discharge setting  See evaluation for individualized goals  Treatment/Interventions: Functional transfer training, ADL retraining, LE strengthening/ROM, Therapeutic exercise, Endurance training, Equipment eval/education, Bed mobility, Gait training, Spoke to nursing  Equipment Recommended: Linard Bernheim       See flowsheet documentation for full assessment, interventions and recommendations  Prognosis: Good  Problem List: Decreased strength, Impaired balance, Decreased mobility, Decreased cognition, Impaired judgement, Decreased safety awareness, Orthopedic restrictions, Pain (Not able to demonstrate understanding of hip precautions)  Assessment: Pt is 80 y o  female seen for PT evaluation s/p admit to [facility] on 7/12/2018 w/ Fall from bed  PT consulted to assess pt's functional mobility and d/c needs  Order placed for PT eval and tx, w/ up w/ A order  Comorbidities affecting pt's physical performance at time of assessment include: Subdural hemorrhage, alzheimer disease, chronic diastolic heart failure, HTN, atrial fibrillation, stage 4 chronic kidney disease, coagulopathy, intraparenchymal hematoma of the brain, subarachnoid hemorrhage, displaced intertrochanteric fracture of left femur  PTA, pt was residing at an [facility] and prior level of function needs clarification from case management  Personal factors affecting pt at time of IE include: inability to ambulate household distances, decreased cognition, positive fall history, limited insight into impairments, inability to perform IADLs and inability to perform ADLs   Please find objective findings from PT assessment regarding body systems outlined above with impairments and limitations including weakness, decreased ROM, impaired balance, gait deviations, pain, decreased activity tolerance, decreased functional mobility tolerance, decreased safety awareness, impaired judgement and fall risk  The following objective measures performed on IE also reveal limitations: Barthel Index: 25/100  Pt's clinical presentation is currently unstable/unpredictable seen in pt's presentation of Weight bearing status- BLE WBAT, posterior hip precautions, abnormal lab values, advanced dementia  Pt to benefit from continued PT tx to address deficits as defined above and maximize level of functional independent mobility and consistency  From PT/mobility standpoint, recommendation at time of d/c would be short term skilled rehab pending progress in order to facilitate return to PLOF  Recommendation:  (Rehab, d/c to home long-term with rehab if available)     PT - OK to Discharge: Yes    See flowsheet documentation for full assessment

## 2018-07-13 NOTE — ANESTHESIA POSTPROCEDURE EVALUATION
Post-Op Assessment Note      CV Status:  Stable    Post-procedure mental status: Sleepy      Hydration Status:  Euvolemic and stable    PONV Controlled:  None    Airway Patency:  Patent    Post Op Vitals Reviewed: Yes          Staff: AnesthesiologistNADIRA           /82 (07/13/18 1152)    Temp 98 8 °F (37 1 °C) (07/13/18 1152)    Pulse 104 (07/13/18 1152)   Resp 21 (07/13/18 1152)    SpO2 95 % (07/13/18 1152)

## 2018-07-13 NOTE — ASSESSMENT & PLAN NOTE
· Alert but oriented to self only  · Regular sleep/wake cycles  · High risk for delirium  · Seroquel ordered for bedtime as needed

## 2018-07-13 NOTE — ASSESSMENT & PLAN NOTE
· Patient suffered mechanical fall from bed at [facility]  Recently admitted after cephalomedullary nail left proximal femur by Dr Irma Dallas 5/28/18    · Left hip hardware failure   · Appreciate ortho input, POD#0 for removal of hardware left hip and conversion to long stem hip hemiarthroplasty  · Geriatric pain order set

## 2018-07-13 NOTE — ANESTHESIA PREPROCEDURE EVALUATION
Review of Systems/Medical History  Patient summary reviewed  Chart reviewed  No history of anesthetic complications     Cardiovascular  EKG reviewed, Hypertension , CHF (chronic diastolic CHF) ,    Pulmonary       GI/Hepatic       Kidney disease CKD, Chronic kidney disease stage 4,        Endo/Other    Obesity (BMI 35 4)    GYN       Hematology  Anemia ,     Musculoskeletal    Comment: Left femur displaced intertrochanteric fracture      Neurology      Comment: Alzheimer's dementia Psychology   Negative psychology ROS              Physical Exam    Airway    Mallampati score: II  TM Distance: >3 FB  Neck ROM: full     Dental   Comment: edentulous,     Cardiovascular  Rhythm: regular, Rate: normal, Cardiovascular exam normal    Pulmonary  Pulmonary exam normal Breath sounds clear to auscultation,     Other Findings        Anesthesia Plan  ASA Score- 3     Anesthesia Type- general with ASA Monitors  Additional Monitors:   Airway Plan: ETT  Plan Factors-    Induction- intravenous  Postoperative Plan- Plan for postoperative opioid use  Planned trial extubation    Informed Consent- Anesthetic plan and risks discussed with patient and daughter  I personally reviewed this patient with the CRNA  Discussed and agreed on the Anesthesia Plan with the CRNA  Karissa Castellon Plan:  GETA, +/- arterial line    Risks and benefits discussed with patient's daughter Keren Blanco  Questions answered  Patient's daughter consented as patient has Alzheimer's dementia

## 2018-07-13 NOTE — PROGRESS NOTES
Progress Note - Orthopedics   Lani Abbott 80 y o  female MRN: [id number]  Unit/Bed#: cardiology      Subjective:    80 y  o female presenting to the hospital with left femoral IM nail cut out  She has some pain in the hip area  No acute events, no complaints  Pt doing well  Pain controlled  Denies fevers chills, CP, SOB    Labs:    0  Lab Value Date/Time   HCT 28 9 (L) 07/13/2018 0536   HCT 32 0 (L) 07/12/2018 0441   HCT 33 4 (L) 07/11/2018 1713   HCT 24 3 (L) 06/02/2018 0515   HGB 9 0 (L) 07/13/2018 0536   HGB 9 8 (L) 07/12/2018 0441   HGB 10 5 (L) 07/11/2018 1713   HGB 8 1 (L) 06/02/2018 0515   INR 1 15 07/13/2018 0536   WBC 6 57 07/13/2018 0536   WBC 7 64 07/12/2018 0441   WBC 9 19 07/11/2018 1713   WBC 11 2 (H) 06/02/2018 0515   ESR 92 (H) 05/29/2018 1353       Meds:    Current Facility-Administered Medications:     albuterol inhalation solution 2 5 mg, 2 5 mg, Nebulization, Q4H PRN, Antionette Farnsworth MD    allopurinol (ZYLOPRIM) tablet 100 mg, 100 mg, Oral, Daily, Sudarshan Thakkar MD    aspirin tablet 325 mg, 325 mg, Oral, Daily, Sudarshan Thakkar MD    atenolol (TENORMIN) tablet 50 mg, 50 mg, Oral, Daily, Sudarshan Thakkar MD    budesonide-formoterol (SYMBICORT) 160-4 5 mcg/act inhaler 2 puff, 2 puff, Inhalation, BID, Sudarshan Hernandez MD, 2 puff at 07/12/18 1859    enoxaparin (LOVENOX) subcutaneous injection 30 mg, 30 mg, Subcutaneous, Q24H [address], Sudarshan Thakkar MD    ipratropium (ATROVENT) 0 02 % inhalation solution 0 5 mg, 0 5 mg, Nebulization, TID, Antionette Farnsworth MD, 0 5 mg at 07/12/18 1906    levalbuterol (Valentina Cowper) inhalation solution 1 25 mg, 1 25 mg, Nebulization, TID, Antionette Farnsworth MD, 1 25 mg at 07/12/18 1906    nystatin (MYCOSTATIN) powder 1 application, 1 application, Topical, BID, Sudarshan Hernandez MD, 1 application at 65/03/13 0813    ondansetron (ZOFRAN) injection 4 mg, 4 mg, Intravenous, Q6H PRN, Sudarshan Thakkar MD    oxyCODONE (ROXICODONE) IR tablet 5 mg, 5 mg, Oral, Q4H PRN, Sudarshan Thakkar MD    pantoprazole (PROTONIX) EC tablet 40 mg, 40 mg, Oral, Early Morning, Sudarshan Ribeiro MD, 40 mg at 07/13/18 0538    polyethylene glycol (MIRALAX) packet 17 g, 17 g, Oral, Daily, Sudarshan Thakkar MD    potassium chloride (K-DUR,KLOR-CON) CR tablet 10 mEq, 10 mEq, Oral, Every Other Day, Sudarshan Thakkar MD    potassium chloride (K-DUR,KLOR-CON) CR tablet 20 mEq, 20 mEq, Oral, Once, Marky Toth PA-C    QUEtiapine (SEROquel) tablet 12 5 mg, 12 5 mg, Oral, HS PRN, Sudarshan Thakkar MD    senna (SENOKOT) tablet 8 6 mg, 8 6 mg, Oral, Daily, Sudarshan Thakkar MD    silver sulfadiazine (SILVADENE,SSD) 1 % cream 1 application, 1 application, Topical, Daily PRN, Sudarshan Ribeiro MD, 1 application at 67/05/82 0813    Blood Culture:   No results found for: BLOODCX    Wound Culture:   Lab Results   Component Value Date    WOUNDCULT 2+ Growth of Staphylococcus aureus (A) 02/15/2018       Ins and Outs:  I/O last 24 hours: In: 170 [P O :170]  Out: 787 [Urine:787]          Physical:  Vitals:    07/12/18 2249   BP: 135/79   Pulse: 95   Resp: 19   Temp: 98 4 °F (36 9 °C)   SpO2:      Musculoskeletal: left Lower Extremity  · Skin without ecchymosis  · TTP over cut out site  · +fhl/ehl, +ankle dorsi/plantar flexion  +PT pulse    _*_*_*_*_*_*_*_*_*_*_*_*_*_*_*_*_*_*_*_*_*_*_*_*_*_*_*_*_*_*_*_*_*_*_*_*_*_*_*_*_*    Assessment:    80 y  o female with left femoral IM nail cut out     Plan:  · NWB LLE  · Plan for OR today for conversion to hip hemiarthroplasty  · Pending clearance from cardiology - awaiting ECHO this AM  · PT/OT  · Pain control  · DVT ppx  · Dispo: Ortho will follow    Ghazal Gonzalez PA-C

## 2018-07-13 NOTE — CONSULTS
[address] [facility]  [address] [facility],  [address] [address]  [phone]                                              Cardiology Consult  Joelle Hathaway 80 y o  female   YOB: 1931 MRN: [id number]  Unit/Bed#: OR POOL Encounter: [id number]      Assessment  1  Pre-operative evaluation  2  Fall  3  Permanent Atrial fibrillation  4  Left hip fracture, s/p recent left hip IM nailing, now with displaced hardware  5  Chronic diastolic heart failure  6  Advanced dementia  7  CKD  8  Hypertension    Plan  Pre-operative evaluation  · Patient has advanced dementia and is unable to provide any accurate history  History is mostly based on her available chart information  · Planned procedure: Left hip hemiarthroplasti  · Active cardiac complaints: Mild edema  · Cardiac co-morbidities: ?Diastolic heart failure  · Other medical co-morbidities: Advanced dementia, CKD, permanent atrial fibrillation  · Functional status: limited  Patient unable to give appropriate history on same  · No known h/o CAD  · Vitals - reviewed and stable  · Labs reviewed - Hb 9, Cr 1 49  · EKG - Atrial fibrillation, non specific ST changes, HR 80  · Echo - my prelim read - normal LV size and systolic function, moderate MR  · NT-Pro BNP 5514, CXR mild vascular congestion  · Got IV lasix 40 yesterday, and made good urine - I&O inaccurate as she was incontinent per discussion with nursing  · She continues to be mildly volume overloaded on clinical exam  · But does not have any acute shortness of breath, or orthopnea  · She is currently able to lie flat and is 97-99% on room air  · Okay to proceed with necessary semi-urgent surgical procedure  · She will be at intermediate cardiac risk for an intermediate risk surgery  · She will likely need further diuresis post-surgery       History of Present Illness   HPI: Joelle Hathaway is a 80y o  year old female who presents with a fall at the rehab facility      79yo F with advanced dementia originally presented to [facility] ED from other facility on 7/11, after being found on the floor in the morning  She had recently had a similar fall, and undergone intramedullary nailing for the same in 5/2018  On ED eval this presentation, she was found to have a displaced IM nail, and as a result was transferred to [facility] for further orthopedic evaluation  She is pleasantly demented, is not well oriented to surroundings, and is unable to provide much accurate history (she believes she is a 16year old living with her parents)  She was recommended repeat surgical intervention with maged-arthoplasty by ortho, and a pre-operative evaluation was done by internal medicine yesterday  She was noted to be volume overloaded, and there was concern for heart failure  As a result, she received IV lasix 40, and a cardiology evaluation was requested  I received a call about the same this morning from orthopedics resident and was requested to evaluate the patient urgently, as she was scheduled for surgery in the next hour  Patient does not report any complaints of shortness of breath, chest pain, dizziness or palpitations at present  Past Medical History:   Diagnosis Date    A-fib [facility])     Alzheimer's dementia     CHF (congestive heart failure) ([facility] [address] )     Gout     Hypertension     Renal disorder      Past Surgical History:   Procedure Laterality Date    MS OPEN RX FEMUR FX+INTRAMED DUSTIN Left 5/28/2018    Procedure: INSERTION NAIL IM FEMUR ANTEGRADE (TROCHANTERIC); Surgeon: Marita Singh MD;  Location: [facility];  Service: Orthopedics     History reviewed  No pertinent family history    Meds/Allergies   all current active meds have been reviewed and current meds: Current Facility-Administered Medications   Medication Dose Route Frequency    [MAR Hold] albuterol inhalation solution 2 5 mg  2 5 mg Nebulization Q4H PRN    [MAR Hold] allopurinol (ZYLOPRIM) tablet 100 mg  100 mg Oral Daily    [MAR Hold] aspirin tablet 325 mg  325 mg Oral Daily    [MAR Hold] atenolol (TENORMIN) tablet 50 mg  50 mg Oral Daily    [MAR Hold] budesonide-formoterol (SYMBICORT) 160-4 5 mcg/act inhaler 2 puff  2 puff Inhalation BID    [MAR Hold] enoxaparin (LOVENOX) subcutaneous injection 30 mg  30 mg Subcutaneous Q24H [address]    [MAR Hold] ipratropium (ATROVENT) 0 02 % inhalation solution 0 5 mg  0 5 mg Nebulization TID    [MAR Hold] levalbuterol (XOPENEX) inhalation solution 1 25 mg  1 25 mg Nebulization TID    [MAR Hold] nystatin (MYCOSTATIN) powder 1 application  1 application Topical BID    [MAR Hold] ondansetron (ZOFRAN) injection 4 mg  4 mg Intravenous Q6H PRN    [MAR Hold] oxyCODONE (ROXICODONE) IR tablet 5 mg  5 mg Oral Q4H PRN    [MAR Hold] pantoprazole (PROTONIX) EC tablet 40 mg  40 mg Oral Early Morning    [MAR Hold] polyethylene glycol (MIRALAX) packet 17 g  17 g Oral Daily    [MAR Hold] potassium chloride (K-DUR,KLOR-CON) CR tablet 10 mEq  10 mEq Oral Every Other Day    [MAR Hold] potassium chloride (K-DUR,KLOR-CON) CR tablet 20 mEq  20 mEq Oral Once    [MAR Hold] QUEtiapine (SEROquel) tablet 12 5 mg  12 5 mg Oral HS PRN    [MAR Hold] senna (SENOKOT) tablet 8 6 mg  8 6 mg Oral Daily    [MAR Hold] silver sulfadiazine (SILVADENE,SSD) 1 % cream 1 application  1 application Topical Daily PRN     Prescriptions Prior to Admission   Medication    acetaminophen (TYLENOL) 500 mg tablet    allopurinol (ZYLOPRIM) 100 mg tablet    aluminum-magnesium hydroxide-simethicone (MYLANTA) 200-200-20 mg/5 mL suspension    aspirin 325 mg tablet    atenolol (TENORMIN) 50 mg tablet    Budesonide (PULMICORT IN)    budesonide-formoterol (SYMBICORT) 160-4 5 mcg/act inhaler    cholecalciferol (VITAMIN D3) 1,000 units tablet    docusate sodium (COLACE) 100 mg capsule    enoxaparin (LOVENOX) 30 mg/0 3 mL    ferrous sulfate 324 (65 Fe) mg    furosemide (LASIX) 40 mg tablet    ipratropium-albuterol (DUO-NEB) 0 5-2 5 mg/3 mL    Magnesium 65 MG TABS    Magnesium Oxide (MAG-OX PO)    Melatonin-Pyridoxine (EQL MELATONIN/VITAMIN B-6) 5-1 MG TABS    nystatin (MYCOSTATIN) powder    oxyCODONE (ROXICODONE) 5 mg immediate release tablet    pantoprazole (PROTONIX) 40 mg tablet    polyethylene glycol (MIRALAX) 17 g packet    potassium chloride (K-DUR,KLOR-CON) 10 mEq tablet    QUEtiapine (SEROquel) 25 mg tablet    senna (SENOKOT) 8 6 mg    silver sulfadiazine (SILVADENE,SSD) 1 % cream     Allergies   Allergen Reactions    Penicillins Other (See Comments)     Unknown reaction; however, tolerated cefazolin    Rivastigmine      Social History     Social History    Marital status:      Spouse name: N/A    Number of children: N/A    Years of education: N/A     Social History Main Topics    Smoking status: Never Smoker    Smokeless tobacco: Never Used    Alcohol use No    Drug use: No    Sexual activity: No     Other Topics Concern    None     Social History Narrative    None         Review of Systems  No c/o chest pain, No c/o palpitations, No c/o shortness of breath, No c/o dizziness or light-headedness, No c/o abdominal pain, no c/o nausea/vomitting  All other systems negative    Vitals:    07/12/18 1906 07/12/18 2046 07/12/18 2249 07/13/18 0738   BP:   135/79    BP Location:   Right arm    Pulse:   95    Resp:   19    Temp:   98 4 °F (36 9 °C)    TempSrc:   Oral    SpO2: 95% 95%  97%   Weight:         Orthostatic Blood Pressures      Most Recent Value   Blood Pressure  135/79 filed at 07/12/2018 2249   Patient Position - Orthostatic VS  Lying filed at 07/12/2018 2249        Body mass index is 35 4 kg/m²  Wt Readings from Last 5 Encounters:   07/12/18 87 8 kg (193 lb 9 oz)   07/11/18 88 5 kg (195 lb 3 2 oz)   07/11/18 88 5 kg (195 lb 1 7 oz)   05/30/18 86 8 kg (191 lb 5 8 oz)   05/27/18 86 9 kg (191 lb 9 3 oz)     I/O last 3 completed shifts:   In: 170 [P O :170]  Out: 787 [Urine:787]      Physical Exam    Constitutional:  Rolo Smith appears to be in no acute distress, is awake, alert, but not oriented to time, place or person  HEENT:    Normocephalic   Neck:  Supple, JVD+   Lungs:  Clear to auscultation bilaterally, respirations unlabored    Chest Wall:  No tenderness or deformity    Heart::  Regular rate, Regular rhythm, S1 and S2 normal, no murmur, rub or gallop    Abdomen:  Soft, non-tender, non-distended   Extremities:  trace pedal edema, No calf tenderness         Labs:    Results from last 7 days  Lab Units 07/13/18  0536 07/12/18  0441 07/11/18  1713   WBC Thousand/uL 6 57 7 64 9 19   HEMOGLOBIN g/dL 9 0* 9 8* 10 5*   HEMATOCRIT % 28 9* 32 0* 33 4*   RDW % 15 5* 15 5* 15 6*   PLATELETS Thousands/uL 268 303 322       Results from last 7 days  Lab Units 07/13/18  0536 07/12/18  0441 07/11/18  1713   SODIUM mmol/L 144 143 143   POTASSIUM mmol/L 3 4* 3 3* 4 2   CHLORIDE mmol/L 106 106 103   CO2 mmol/L 32 30 32   MAGNESIUM mg/dL  --   --  1 9   BUN mg/dL 24 26* 29*   CREATININE mg/dL 1 49* 1 41* 1 53*   CALCIUM mg/dL 8 5 9 0 8 8   TOTAL PROTEIN g/dL  --   --  7 1   BILIRUBIN TOTAL mg/dL  --   --  0 50   AST U/L  --   --  34   ALT U/L  --   --  18   ALK PHOS U/L  --   --  157*   GLUCOSE RANDOM mg/dL 87 79 107       Results from last 7 days  Lab Units 07/11/18  1713   CK TOTAL U/L 80           Results from last 7 days  Lab Units 07/13/18  0536 07/11/18  1713   INR  1 15 1 08           EKG: Afib  Telemetry: Afib, HR 80s  Imaging: Xr Chest Pa & Lateral    Result Date: 7/12/2018  Narrative: CHEST INDICATION:   pre op, wheezing, hx CHF  COMPARISON:  None EXAM PERFORMED/VIEWS:  XR CHEST PA & LATERAL FINDINGS: Stable cardiomegaly is noted  Mild pulmonary vascular congestion is present  Osseous structures appear within normal limits for patient age  Impression: Mild pulmonary vascular congestion   Workstation performed: [id number]     Xr Forearm 2 Views Right    Result Date: 7/11/2018  Narrative: RIGHT FOREARM INDICATION:   Right forearm pain, fall  COMPARISON:  None VIEWS:  XR FOREARM 2 VW RIGHT FINDINGS: Question irregularity/lucency of the radial styloid distally  Otherwise, negative for forearm fracture  Bony alignment is maintained  Degenerative changes of the medial elbow as well as the 1st CMC joint  No lytic or blastic lesions are seen  Angiocath in the antecubital fossa  Impression: Question irregularity/lucency of the distal radial styloid  Correlate with any point tenderness  Otherwise, negative for forearm fracture  Workstation performed: [id number]     Xr Hips Bilateral 2 Vw W Pelvis If Performed    Result Date: 7/11/2018  Narrative: BILATERAL HIPS AND PELVIS INDICATION:   Fall, left hip pain  History of dementia  COMPARISON: 5/27/2018, 5/28/2018 VIEWS:  XR HIPS BILATERAL 2 VW W PELVIS IF PERFORMED Images: 5  FINDINGS: The bony pelvis appears intact  Degenerative changes visualized lower lumbar spine  Bilateral pelvic phleboliths  LEFT HIP: Short IM juan with femoral neck nail and distal interlocking screw are again seen transfixing a previous intertrochanteric fracture with extension to the greater trochanter  The femoral neck nail has now migrated beyond the cortex of the superolateral femoral head into the joint space  This is new from the previous study  There is no discernible new fracture although there is probably slightly more pronounced coxa varus deformity  There is heterotopic ossification or abundant callus formation seen about the left femoral neck with avulsion of the lesser trochanter again noted  RIGHT HIP: No significant hip degenerative changes  Joint space alignment is maintained  Soft tissues are unremarkable  Impression: Left hip hardware failure with migration of the femoral neck nail through the superolateral femoral head cortex into the joint space    There is perhaps slightly more pronounced coxa varus deformity of the left hip compared to the postoperative film, however no discernible new fracture  The study was marked in [facility] for immediate notification  Workstation performed: [id number]     Xr Knee 1 Or 2 Vw Left    Result Date: 7/12/2018  Narrative: LEFT KNEE INDICATION:   pre op planning  Left knee pain COMPARISON:  5/28/2018 VIEWS:  XR KNEE 1 OR 2 VW LEFT FINDINGS: There is no acute fracture or dislocation  No significant joint effusion There is rather severe narrowing of the medial joint compartment  Tricompartmental hypertrophic spurring is present  No lytic or blastic lesions are seen  Soft tissues are unremarkable  Impression: Left knee osteoarthritis with rather severe medial joint compartment narrowing Workstation performed: [id number]     Ct Head Without Contrast    Result Date: 7/11/2018  Narrative: CT BRAIN - WITHOUT CONTRAST INDICATION:   fall  COMPARISON:  CT brain 1/12/2018 TECHNIQUE:  CT examination of the brain was performed  In addition to axial images, coronal 2D reformatted images were created and submitted for interpretation  Radiation dose length product (DLP) for this visit:  1937 mGy-cm   This examination, like all CT scans performed in the [facility], was performed utilizing techniques to minimize radiation dose exposure, including the use of iterative reconstruction and automated exposure control  IMAGE QUALITY:  Diagnostic  FINDINGS: PARENCHYMA: Decreased attenuation is noted in periventricular and subcortical white matter demonstrating an appearance that is statistically most likely to represent moderate microangiopathic change; this appearance is similar when compared to most recent prior examination  No CT signs of acute infarction  No intracranial mass, mass effect or midline shift  No acute parenchymal hemorrhage  VENTRICLES AND EXTRA-AXIAL SPACES:  Normal for the patient's age  VISUALIZED ORBITS AND PARANASAL SINUSES:  Unremarkable   CALVARIUM AND EXTRACRANIAL SOFT TISSUES:  Normal      Impression: No acute intracranial abnormality  Workstation performed: [id number]     Ct Spine Cervical Without Contrast    Result Date: 7/11/2018  Narrative: CT CERVICAL SPINE - WITHOUT CONTRAST INDICATION:   fall  COMPARISON: None  TECHNIQUE:  CT examination of the cervical spine was performed without intravenous contrast   Contiguous axial images were obtained  Sagittal and coronal reconstructions were performed  Radiation dose length product (DLP) for this visit:  505 31 mGy-cm   This examination, like all CT scans performed in the [facility], was performed utilizing techniques to minimize radiation dose exposure, including the use of iterative  reconstruction and automated exposure control  IMAGE QUALITY:  Diagnostic  FINDINGS: ALIGNMENT:  Normal alignment of the cervical spine  No subluxation  VERTEBRAL BODIES:  No fracture  DEGENERATIVE CHANGES:  Multilevel degenerative changes including multilevel neural foraminal and spinal canal narrowing  PREVERTEBRAL AND PARASPINAL SOFT TISSUES:  Unremarkable  THORACIC INLET:  Normal      Impression: 1  No acute fracture or dislocation  2   Degenerative changes  Workstation performed: [id number]       Cardiac testing:   No results found for this or any previous visit  No results found for this or any previous visit  No results found for this or any previous visit  No results found for this or any previous visit  Counseling / Coordination of Care  Total floor / unit time spent today 30 minutes  Araceli Bar

## 2018-07-13 NOTE — ASSESSMENT & PLAN NOTE
· Normally on lasix 60mg BID - resume tonight   · Continue atenolol   · No ACEI or ARB 2/2 CKD  · S/p extra dose of IV lasix 40mg yesterday, appears clinically volume overloaded   · Appreciate cards input, may need further diuresis post op - discontinue post op fluids   · CXR with mild vascular congestion, bnp >5000   · Monitor I/O, daily wts, low sodium diet

## 2018-07-13 NOTE — OP NOTE
Op Note- Orthopedics   Iker Marquez  MRN: [id number]      Unit/Bed#: Operating Room      PreOp Dx: Left helical blade cut out status post cephalomedullary nailing for intertrochanteric femur fracture     Postop Dx:  Same as preoperative diagnosis    Procedure:  Conversion left hip hemiarthroplasty; removal of deep implant left proximal femur    Surgeon: Elyssa Isidro    Assistants: Wang Oconnor MD, Stacey Hi PA-C,     Fluids:     EBL:     Drains: None    Specimens:  none    Complications:  none    Condition:  Stable and transferred to postanesthesia care unit    Implant:  DePuy Corail  Revision stem size 13; Outer diameter 45; inner diameter 28 / +35     80year-old female status post cephalomedullary nailing for left intertrochanteric femur fracture presents at this time after the complaints of pain and radiographic imaging which was significant for helical blade cut out through the femoral head  Patient presenting at this time for conversion left hip hemiarthroplasty  Patient's daughter was told of all the pros and cons of operative and non operative intervention  Some complications include infection, bleeding, scarring, nerve injury, vascular injury, continued pain, DVT, PE, death, loss of limb, hip dislocation, leg-length discrepancy, not obtaining  Result patient wished  Patient's daughter understood the pros and cons of operative and non operative intervention and consented for operative intervention  Posterior incision was made to the hip  After going through skin significant amount of fatty tissue we able to identify the fascia  At this time we were able to excise the greater trochanteric bursal tissue  We able to identify  The helical blade and this was removed  We identified the proximal most portion of the cephalomedullary nail and the set screw was advanced distally prior to placing our nail extractor  Distal locking screw was then subsequently removed  Femoral nail was removed  Patient was noted to have  A non healed fracture after dislocation  We did broach to the appropriate size long femoral stem  After obtaining appropriate fit, we able to place the appropriate size trial component /femoral neck / femoral head  Patient was placed through range of motion and noted to be stable and equal leg lengths  Trial implants were removed and copious irrigation was used at the operative sites  The true femoral implant was placed as well as the true bipolar head  Patient was placed through provocative ranges of motion to check for stability  Patient was also checked for leg lengths and was noted to be stable  We were at this time able to place cables around the greater trochanteric region to go around the femoral implant  Irrigation was Will was used at the operative sites  Vicryl sutures were used to reapproximate the fascial layers as well as the fatty layers  Subcu layer was reapproximated with Vicryl sutures subsequently  This was reinforced with staples  Wounds were cleaned and dried  Acticoat 4 x 4 ABD and Tegaderm were placed  Patient was then subsequently placed in a supine position with an abduction pillow placed  Awakened as per anesthesia team   Noted to be in stable condition  Transferred to postanesthesia care unit

## 2018-07-14 LAB
ANION GAP SERPL CALCULATED.3IONS-SCNC: 8 MMOL/L (ref 4–13)
BUN SERPL-MCNC: 28 MG/DL (ref 5–25)
CALCIUM SERPL-MCNC: 8.6 MG/DL (ref 8.3–10.1)
CHLORIDE SERPL-SCNC: 106 MMOL/L (ref 100–108)
CO2 SERPL-SCNC: 30 MMOL/L (ref 21–32)
CREAT SERPL-MCNC: 1.7 MG/DL (ref 0.6–1.3)
ERYTHROCYTE [DISTWIDTH] IN BLOOD BY AUTOMATED COUNT: 15.7 % (ref 11.6–15.1)
GFR SERPL CREATININE-BSD FRML MDRD: 27 ML/MIN/1.73SQ M
GLUCOSE SERPL-MCNC: 107 MG/DL (ref 65–140)
HCT VFR BLD AUTO: 27.9 % (ref 34.8–46.1)
HGB BLD-MCNC: 8.9 G/DL (ref 11.5–15.4)
MCH RBC QN AUTO: 31.7 PG (ref 26.8–34.3)
MCHC RBC AUTO-ENTMCNC: 31.9 G/DL (ref 31.4–37.4)
MCV RBC AUTO: 99 FL (ref 82–98)
PLATELET # BLD AUTO: 280 THOUSANDS/UL (ref 149–390)
PMV BLD AUTO: 10.3 FL (ref 8.9–12.7)
POTASSIUM SERPL-SCNC: 3.9 MMOL/L (ref 3.5–5.3)
RBC # BLD AUTO: 2.81 MILLION/UL (ref 3.81–5.12)
SODIUM SERPL-SCNC: 144 MMOL/L (ref 136–145)
WBC # BLD AUTO: 11.17 THOUSAND/UL (ref 4.31–10.16)

## 2018-07-14 PROCEDURE — 80048 BASIC METABOLIC PNL TOTAL CA: CPT | Performed by: PHYSICIAN ASSISTANT

## 2018-07-14 PROCEDURE — G8988 SELF CARE GOAL STATUS: HCPCS

## 2018-07-14 PROCEDURE — 94760 N-INVAS EAR/PLS OXIMETRY 1: CPT

## 2018-07-14 PROCEDURE — 94640 AIRWAY INHALATION TREATMENT: CPT

## 2018-07-14 PROCEDURE — 99232 SBSQ HOSP IP/OBS MODERATE 35: CPT | Performed by: INTERNAL MEDICINE

## 2018-07-14 PROCEDURE — 85027 COMPLETE CBC AUTOMATED: CPT | Performed by: PHYSICIAN ASSISTANT

## 2018-07-14 PROCEDURE — 97167 OT EVAL HIGH COMPLEX 60 MIN: CPT

## 2018-07-14 PROCEDURE — G8987 SELF CARE CURRENT STATUS: HCPCS

## 2018-07-14 PROCEDURE — 97116 GAIT TRAINING THERAPY: CPT

## 2018-07-14 PROCEDURE — 99024 POSTOP FOLLOW-UP VISIT: CPT | Performed by: PHYSICIAN ASSISTANT

## 2018-07-14 RX ORDER — ACETAMINOPHEN 325 MG/1
975 TABLET ORAL EVERY 8 HOURS SCHEDULED
Status: DISCONTINUED | OUTPATIENT
Start: 2018-07-14 | End: 2018-07-18 | Stop reason: HOSPADM

## 2018-07-14 RX ORDER — SODIUM CHLORIDE 9 MG/ML
75 INJECTION, SOLUTION INTRAVENOUS CONTINUOUS
Status: DISCONTINUED | OUTPATIENT
Start: 2018-07-14 | End: 2018-07-14

## 2018-07-14 RX ADMIN — ACETAMINOPHEN 975 MG: 325 TABLET ORAL at 08:51

## 2018-07-14 RX ADMIN — POTASSIUM CHLORIDE 10 MEQ: 750 TABLET, EXTENDED RELEASE ORAL at 08:44

## 2018-07-14 RX ADMIN — NYSTATIN 1 APPLICATION: 100000 POWDER TOPICAL at 08:51

## 2018-07-14 RX ADMIN — IPRATROPIUM BROMIDE 0.5 MG: 0.5 SOLUTION RESPIRATORY (INHALATION) at 08:09

## 2018-07-14 RX ADMIN — LEVALBUTEROL HYDROCHLORIDE 1.25 MG: 1.25 SOLUTION, CONCENTRATE RESPIRATORY (INHALATION) at 08:08

## 2018-07-14 RX ADMIN — IPRATROPIUM BROMIDE 0.5 MG: 0.5 SOLUTION RESPIRATORY (INHALATION) at 13:46

## 2018-07-14 RX ADMIN — BUDESONIDE AND FORMOTEROL FUMARATE DIHYDRATE 2 PUFF: 160; 4.5 AEROSOL RESPIRATORY (INHALATION) at 08:51

## 2018-07-14 RX ADMIN — LEVALBUTEROL HYDROCHLORIDE 1.25 MG: 1.25 SOLUTION, CONCENTRATE RESPIRATORY (INHALATION) at 20:58

## 2018-07-14 RX ADMIN — LEVALBUTEROL HYDROCHLORIDE 1.25 MG: 1.25 SOLUTION, CONCENTRATE RESPIRATORY (INHALATION) at 08:07

## 2018-07-14 RX ADMIN — SILVER SULFADIAZINE 1 APPLICATION: 10 CREAM TOPICAL at 08:50

## 2018-07-14 RX ADMIN — CEFAZOLIN SODIUM 1000 MG: 1 SOLUTION INTRAVENOUS at 01:13

## 2018-07-14 RX ADMIN — DOCUSATE SODIUM 100 MG: 100 CAPSULE, LIQUID FILLED ORAL at 17:18

## 2018-07-14 RX ADMIN — NYSTATIN 1 APPLICATION: 100000 POWDER TOPICAL at 17:23

## 2018-07-14 RX ADMIN — TRAMADOL HYDROCHLORIDE 50 MG: 50 TABLET, FILM COATED ORAL at 07:16

## 2018-07-14 RX ADMIN — PANTOPRAZOLE SODIUM 40 MG: 40 TABLET, DELAYED RELEASE ORAL at 05:43

## 2018-07-14 RX ADMIN — ASPIRIN 325 MG: 325 TABLET ORAL at 08:44

## 2018-07-14 RX ADMIN — LEVALBUTEROL HYDROCHLORIDE 1.25 MG: 1.25 SOLUTION, CONCENTRATE RESPIRATORY (INHALATION) at 13:46

## 2018-07-14 RX ADMIN — ACETAMINOPHEN 975 MG: 325 TABLET ORAL at 21:43

## 2018-07-14 RX ADMIN — FUROSEMIDE 60 MG: 40 TABLET ORAL at 08:44

## 2018-07-14 RX ADMIN — BUDESONIDE AND FORMOTEROL FUMARATE DIHYDRATE 2 PUFF: 160; 4.5 AEROSOL RESPIRATORY (INHALATION) at 17:19

## 2018-07-14 RX ADMIN — QUETIAPINE FUMARATE 12.5 MG: 25 TABLET ORAL at 08:42

## 2018-07-14 RX ADMIN — DOCUSATE SODIUM 100 MG: 100 CAPSULE, LIQUID FILLED ORAL at 08:45

## 2018-07-14 RX ADMIN — ATENOLOL 50 MG: 50 TABLET ORAL at 08:51

## 2018-07-14 RX ADMIN — SENNOSIDES 8.6 MG: 8.6 TABLET, FILM COATED ORAL at 08:45

## 2018-07-14 RX ADMIN — IPRATROPIUM BROMIDE 0.5 MG: 0.5 SOLUTION RESPIRATORY (INHALATION) at 20:58

## 2018-07-14 RX ADMIN — ENOXAPARIN SODIUM 30 MG: 30 INJECTION, SOLUTION INTRAVENOUS; SUBCUTANEOUS at 08:50

## 2018-07-14 RX ADMIN — ALLOPURINOL 100 MG: 100 TABLET ORAL at 08:44

## 2018-07-14 NOTE — PLAN OF CARE
Problem: PHYSICAL THERAPY ADULT  Goal: Performs mobility at highest level of function for planned discharge setting  See evaluation for individualized goals  Treatment/Interventions: Functional transfer training, ADL retraining, LE strengthening/ROM, Therapeutic exercise, Endurance training, Equipment eval/education, Bed mobility, Gait training, Spoke to nursing  Equipment Recommended: Muriel Reddomenica       See flowsheet documentation for full assessment, interventions and recommendations  Outcome: Progressing  Prognosis: Good  Problem List: Decreased strength, Decreased range of motion, Decreased endurance, Impaired balance, Decreased mobility, Pain, Orthopedic restrictions, Decreased coordination, Decreased cognition, Impaired judgement  Assessment: Pt supine in bed upon arrival, confused about her location and situation  Agreeable to OOB to chair  Pt requires min assist x2 for bed mobility, c/o hip pain with moevemnt  Educated on pursed lips breathing  Mod assist x2 for transfers as well as ambuyaltion  Pt was able to take a few steps toward the chair but had c/o increased pain and did not want to weight bear through her left leg, chair was brought up behind pt  This pt will continue to bebenfit from skilled PT to increase strength and endurance in order to maxmize safe fucntional mobility  Recommendation:  (rehab)     PT - OK to Discharge: Yes (to rehab)    See flowsheet documentation for full assessment

## 2018-07-14 NOTE — PROGRESS NOTES
Orthopedics   Julien Malloy 80 y o  female MRN: [id number]  Unit/Bed#: CW3 337-01      Subjective:  80 y  o female post operative day 1 left hip hemiarthroplasty  Pt doing well  Pain controlled on current regimen  Labs:    0  Lab Value Date/Time   HCT 27 9 (L) 07/14/2018 0504   HCT 30 6 (L) 07/13/2018 1216   HCT 28 9 (L) 07/13/2018 0536   HCT 24 3 (L) 06/02/2018 0515   HGB 8 9 (L) 07/14/2018 0504   HGB 9 3 (L) 07/13/2018 1216   HGB 8 5 (L) 07/13/2018 1029   HGB 8 2 (L) 07/13/2018 0951   HGB 9 5 (L) 07/13/2018 0832   HGB 9 0 (L) 07/13/2018 0536   HGB 8 1 (L) 06/02/2018 0515   INR 1 15 07/13/2018 0536   WBC 11 17 (H) 07/14/2018 0504   WBC 13 21 (H) 07/13/2018 1216   WBC 6 57 07/13/2018 0536   WBC 11 2 (H) 06/02/2018 0515   ESR 92 (H) 05/29/2018 1353       Meds:    Current Facility-Administered Medications:     acetaminophen (TYLENOL) tablet 650 mg, 650 mg, Oral, Q6H PRN, Beth Campbell PA-C, 650 mg at 07/13/18 1758    albuterol inhalation solution 2 5 mg, 2 5 mg, Nebulization, Q4H PRN, Betsy Cartagena MD    allopurinol (ZYLOPRIM) tablet 100 mg, 100 mg, Oral, Daily, Sudarshan Thakkar MD    aluminum-magnesium hydroxide-simethicone (MYLANTA) 200-200-20 mg/5 mL oral suspension 15 mL, 15 mL, Oral, Q6H PRN, Beth Campbell PA-C    aspirin tablet 325 mg, 325 mg, Oral, Daily, Sudarshan Thakkar MD    atenolol (TENORMIN) tablet 50 mg, 50 mg, Oral, Daily, Sudasrhan Thakkar MD    budesonide-formoterol (SYMBICORT) 160-4 5 mcg/act inhaler 2 puff, 2 puff, Inhalation, BID, Sudarshan Thakkar MD, 2 puff at 07/13/18 1759    calcium carbonate (TUMS) chewable tablet 1,000 mg, 1,000 mg, Oral, Daily PRN, Beth Campbell PA-C    docusate sodium (COLACE) capsule 100 mg, 100 mg, Oral, BID, Beth Campbell PA-C, 100 mg at 07/13/18 1758    enoxaparin (LOVENOX) subcutaneous injection 30 mg, 30 mg, Subcutaneous, Q24H DIDI, Sudarshan Thakkar MD    furosemide (LASIX) tablet 60 mg, 60 mg, Oral, BID (diuretic), Svitlana Carlitosvivek Toth, FADI, 60 mg at 07/13/18 1549    ipratropium (ATROVENT) 0 02 % inhalation solution 0 5 mg, 0 5 mg, Nebulization, TID, Antionette Farnsworth MD, 0 5 mg at 07/13/18 2133    levalbuterol (XOPENEX) inhalation solution 1 25 mg, 1 25 mg, Nebulization, TID, Antionette Farnsworth MD, 1 25 mg at 07/13/18 2133    nystatin (MYCOSTATIN) powder 1 application, 1 application, Topical, BID, Sudarshan Lin MD, 1 application at 34/87/58 1759    ondansetron (ZOFRAN) injection 4 mg, 4 mg, Intravenous, Q6H PRN, Sudarshan Thakkar MD    oxyCODONE (ROXICODONE) IR tablet 5 mg, 5 mg, Oral, Q4H PRN, Sudarshan Thakkar MD    pantoprazole (PROTONIX) EC tablet 40 mg, 40 mg, Oral, Early Morning, Sudarshan Lin MD, 40 mg at 07/14/18 0543    polyethylene glycol (MIRALAX) packet 17 g, 17 g, Oral, Daily, Sudarshan Thakkar MD    potassium chloride (K-DUR,KLOR-CON) CR tablet 10 mEq, 10 mEq, Oral, Every Other Day, Sudarshan Thakkar MD    potassium chloride (K-DUR,KLOR-CON) CR tablet 20 mEq, 20 mEq, Oral, Once, Marky Toth PA-C    QUEtiapine (SEROquel) tablet 12 5 mg, 12 5 mg, Oral, HS PRN, Sudarshan Thakkar MD    senna (SENOKOT) tablet 8 6 mg, 8 6 mg, Oral, Daily, Sudarshan Thakkar MD    silver sulfadiazine (SILVADENE,SSD) 1 % cream 1 application, 1 application, Topical, Daily PRN, Sduarshan Lin MD, 1 application at 85/05/58 0813    traMADol (ULTRAM) tablet 50 mg, 50 mg, Oral, Q6H PRN, Ariel Orellana PA-C    Blood Culture:   No results found for: BLOODCX    Wound Culture:   Lab Results   Component Value Date    WOUNDCULT 2+ Growth of Staphylococcus aureus (A) 02/15/2018       Ins and Outs:  I/O last 24 hours: In: 2700 [P O :50; I V :1750;  Blood:350; IV Piggyback:250]  Out: 6822 [Urine:1856; Blood:1800]          Physical:  Vitals:    07/14/18 0315   BP: 114/78   Pulse: (!) 115   Resp: 20   Temp: 97 9 °F (36 6 °C)   SpO2: 98%     left lower extremity  · Dressing clean dry intact  · Sensation intact L1-S1  · Motor intact to ankle plantarflexion and dorsiflexion, EHL/FHL  · 2+ dorsalis pedis pulse    _*_*_*_*_*_*_*_*_*_*_*_*_*_*_*_*_*_*_*_*_*_*_*_*_*_*_*_*_*_*_*_*_*_*_*_*_*_*_*_*_*    Assessment: 80 y  o female post operative day 1 left hip hemiarthroplasty  Plan:  · Up and out of bed  · Weightbearing as tolerated  · PT/OT  · DVT prophylaxis  · Analgesics  · Dispo: Ortho will follow  · Will continue to assess for acute blood loss anemia   · Patient has baseline anemia       Anjelica Squires PA-C

## 2018-07-14 NOTE — ASSESSMENT & PLAN NOTE
Permanent afib  Seen by cardiology last admission prior to OR     Rate controlled on tele, not on AC due to history of ICH in the past

## 2018-07-14 NOTE — PLAN OF CARE
Nutrition/Hydration-ADULT     Nutrient/Hydration intake appropriate for improving, restoring or maintaining nutritional needs Progressing        SAFETY,RESTRAINT: NV/NON-SELF DESTRUCTIVE BEHAVIOR     Remains free of harm/injury (restraint for non violent/non self-detsructive behavior) Progressing     Returns to optimal restraint-free functioning Progressing

## 2018-07-14 NOTE — ASSESSMENT & PLAN NOTE
Continue atenolol, Lasix    No ACEI or ARB 2/2 CKD  Appreciate cards input, may need further diuresis post op - discontinue post op fluids

## 2018-07-14 NOTE — OCCUPATIONAL THERAPY NOTE
[address] Evaluation     Patient Name: Shelia ELLIS Date: 7/14/2018  Problem List  Patient Active Problem List   Diagnosis    Subdural hemorrhage    Alzheimer disease    Chronic diastolic congestive heart failure (HCC)    HTN (hypertension)    Atrial fibrillation    Stage 4 chronic kidney disease (HCC)    Coagulopathy    Intraparenchymal hematoma of brain due to trauma   [facility] (subarachnoid hemorrhage)    Fall from bed    [facility] fracture of hip [facility]    Displaced intertrochanteric fracture of left femur, initial encounter for closed fracture ([facility] )    Acute blood loss anemia    Oropharyngeal dysphagia    Left hip pain    Ambulatory dysfunction    Chronic bronchitis ([facility] )     Past Medical History  Past Medical History:   Diagnosis Date    A-fib ([facility] [facility] )     Alzheimer's dementia     CHF (congestive heart failure) ([facility] )     Gout     Hypertension     Renal disorder      Past Surgical History  Past Surgical History:   Procedure Laterality Date    RI OPEN RX FEMUR FX+INTRAMED DUSTIN Left 5/28/2018    Procedure: INSERTION NAIL IM FEMUR ANTEGRADE (TROCHANTERIC); Surgeon: Ty Harris MD;  Location: [facility];  Service: Orthopedics      07/14/18 1150   Note Type   Note type Eval/Treat   Restrictions/Precautions   Weight Bearing Precautions Per Order Yes   RUE Weight Bearing Per Order WBAT   LUE Weight Bearing Per Order WBAT   RLE Weight Bearing Per Order WBAT   LLE Weight Bearing Per Order WBAT   Other Precautions Cognitive; Chair Alarm; Bed Alarm;WBS;THR;Fall Risk;Pain;Hard of hearing   Pain Assessment   Pain Assessment FLACC   Pain Location Hip   Pain Orientation Left   Pain Rating: FLACC (Rest) - Face 0   Pain Rating: FLACC (Rest) - Legs 0   Pain Rating: FLACC (Rest) - Activity 0   Pain Rating: FLACC (Rest) - Cry 0   Pain Rating: FLACC (Rest) - Consolability 0   Score: FLACC (Rest) 0   Pain Rating: FLACC (Activity) - Face 1   Pain Rating: FLACC (Activity) - Legs 1   Pain Rating: FLACC (Activity) - Activity 1   Pain Rating: FLACC (Activity) - Cry 1   Pain Rating: FLACC (Activity) - Consolability 1   Score: FLACC (Activity) 5   Home Living   Type of Home Assisted living   Prior Function   Level of [place] Needs assistance with IADLs; Needs assistance with ADLs and functional mobility   Lives With Facility staff   Receives Help From Personal care attendant   ADL Assistance Needs assistance   IADLs Needs assistance   Falls in the last 6 months 1 to 4   Vocational Retired   Lifestyle   Autonomy PER RECORDS PT [address] - MEALS/HOMEMAKING PROVIDED BY FACILITY    Reciprocal Relationships SUPPORTIVE FAMILY   Service to Others RETIRED   Intrinsic Gratification SEDENTARY   Subjective   Subjective "I FEEL BETTER"   ADL   Eating Assistance 4  Minimal Assistance   Grooming Assistance 4  Minimal Assistance   UB Bathing Assistance 3  Moderate Assistance   LB Bathing Assistance 2  Maximal Assistance   UB Dressing Assistance 3  Moderate Assistance   LB Dressing Assistance 2  Maximal Assistance   Toileting Assistance  2  Maximal Assistance   Bed Mobility   Supine to Sit 3  Moderate assistance   Additional items Assist x 2   Sit to Supine 3  Moderate assistance   Additional items Assist x 2   Transfers   Sit to Stand 4  Minimal assistance   Additional items Assist x 2   Stand to Sit 4  Minimal assistance   Additional items Assist x 2   Stand pivot 3  Moderate assistance   Additional items Assist x 2   Balance   Static Sitting Fair   Dynamic Sitting Fair -   Static Standing Fair -   Dynamic Standing Poor   Activity Tolerance   Activity Tolerance Patient limited by fatigue;Patient limited by pain;Treatment limited secondary to medical complications (Comment)  (COGNITIVE STATUS)   RUE Assessment   RUE Assessment WFL   LUE Assessment   LUE Assessment WFL   Cognition   Overall Cognitive Status Impaired Arousal/Participation Arousable; Cooperative   Attention Attends with cues to redirect   Orientation Level Oriented to person   Memory Decreased long term memory;Decreased recall of biographical information;Decreased short term memory;Decreased recall of recent events;Decreased recall of precautions   Following Commands Follows one step commands with increased time or repetition   Assessment   Limitation Decreased ADL status; Decreased Safe judgement during ADL;Decreased cognition;Decreased endurance;Decreased self-care trans;Decreased high-level ADLs   Prognosis Good   Assessment Pt is a 80 y o  female who was admitted to [facility] on 7/12/2018 with Displaced intertrochanteric fracture of left femur, initial encounter for closed fracture (HCC) and SAH pt s/p recent ORIF 2* fall - fell again oob and had hardware failure - now s/p [facility] and conversion to THR   Pt's problem list also includes PMH of HTN, limited vision, limited hearing, previous surgery, limited cognition, dementia and afib, chf, gout, renal disorder, chronic bronchitis  At baseline pt was completing adls with assist from facility staff at [facility]  Pt lives at assisted living facility  Currently pt requires max assist for overall ADLS and mod a x 2  for functional mobility/transfers  Pt currently presents with impairments in the following categories -difficulty performing ADLS, limited insight into deficits, compliance, decreased initiation and engagement  and health management  activity tolerance, endurance, standing balance/tolerance, sitting balance/tolerance, memory, insight, safety  and judgement    These impairments, as well as pt's fatigue, pain, hip precautions, orthopedic restricitions , WBS  and risk for falls  limit pt's ability to safely engage in all baseline areas of occupation, includingbathing, dressing, toileting, functional mobility/transfers, social participation  and leisure activities  From OT standpoint, recommend inpt rehab prior to return to [facility] level of care  OT will continue to follow to address the below stated goals  Goals   Patient Goals lay down   LTG Time Frame 10-14   Long Term Goal #1 refer to established goals below   Plan   Treatment Interventions ADL retraining;Functional transfer training; Endurance training;Cognitive reorientation;Patient/family training;Equipment evaluation/education; Compensatory technique education; Activityengagement   Goal Expiration Date 07/28/18   OT Frequency 2-3x/wk   Recommendation   OT Discharge Recommendation Short Term Rehab   Barthel Index   Feeding 5   Bathing 0   Grooming Score 0   Dressing Score 0   Bladder Score 5   Bowels Score 5   Toilet Use Score 5   Transfers (Bed/Chair) Score 5   Mobility (Level Surface) Score 0   Stairs Score 0   Barthel Index Score 25       OCCUPATIONAL THERAPY GOALS:    *I FEEDING/GROOMING AFTER SETUP   *MOD  A ADLS AFTER SETUP WITH USE OF ADAPTIVE DEVICES PRN  *MOD A TOILETING AND CLOTHING MANAGEMENT   *MOD A FUNCTIONAL MOB AND TRANSFERS TO ALL SURFACES WITH FAIR BALANCE/SAFETY FOR PARTICIPATION IN DYNAMIC ADLS   *DEMONSTRATE FAIR+ CARRYOVER WITH SAFE USE OF RW, THR PREC AND USE OF LHAE  DURING FUNCTIONAL TASKS  *ASSESS DME NEEDS  *INCREASE ACTIVITY TOLERANCE TO 35-40 MIN FOR PARTICIPATION IN ADLS AND ENJOYABLE ACTIVITIES     *PT TO PARTICIPATE IN ONGOING FUNCTIONAL COGNITIVE ASSESSMENT WITH GOOD ATTENTION/PARTICIPATION TO ASSIST WITH SAFE D/C RECOMMENDATIONS     Raysa Benedict, OT

## 2018-07-14 NOTE — ASSESSMENT & PLAN NOTE
Likely mechanical  recent hip surgery now with fall and possible displacement of hardware     Pain controlled

## 2018-07-14 NOTE — ASSESSMENT & PLAN NOTE
Alert but oriented to self only  Regular sleep/wake cycles  High risk for delirium  Seroquel ordered for bedtime as needed

## 2018-07-14 NOTE — PROGRESS NOTES
Progress Note - Jamel Torrez 1931, 80 y o  female MRN: [id number]    Unit/Bed#: CW3 337-01 Encounter: [id number]    Primary Care Provider: Nereida Ludwig   Date and time admitted to hospital: 2018 12:44 AM        * Displaced intertrochanteric fracture of left femur, initial encounter for closed fracture [facility])   Assessment & Plan    POD #1 left hip hemiarthroplasty  Continue as per Orthopedic surgery  Fall from bed   Assessment & Plan    Likely mechanical  recent hip surgery now with fall and possible displacement of hardware  Pain controlled        Chronic diastolic congestive heart failure (HCC)   Assessment & Plan    Continue atenolol, Lasix  No ACEI or ARB 2/2 CKD  Appreciate cards input, may need further diuresis post op - discontinue post op fluids           HTN (hypertension)   Assessment & Plan    Resume home oral regimen        Atrial fibrillation   Assessment & Plan    Permanent afib  Seen by cardiology last admission prior to OR  Rate controlled on tele, not on AC due to history of ICH in the past        Stage 4 chronic kidney disease ([facility] [address] )   Assessment & Plan    Baseline creatinine 2-2 5   Stable, monitor labs         Alzheimer disease   Assessment & Plan    Alert but oriented to self only  Regular sleep/wake cycles  High risk for delirium  Seroquel ordered for bedtime as needed         Oropharyngeal dysphagia   Assessment & Plan    Continue dysphagia pureed with HTL        ______________________________________________________________________    SUBJECTIVE:   Patient seen and examined  She appears comfortable  Pleasantly confused  Follows commands  No acute events overnight      OBJECTIVE:     Vitals:   HR:  [] 116  Resp:  [16-24] 20  BP: (114-152)/(76-96) 126/85  SpO2:  [91 %-98 %] 94 %  Temp (24hrs), Av 6 °F (36 4 °C), Min:97 °F (36 1 °C), Max:98 2 °F (36 8 °C)  Current: Temperature: 98 2 °F (36 8 °C)    Intake/Output Summary (Last 24 hours) at [address] [employer] filed at 07/14/18 1027   Gross per 24 hour   Intake              200 ml   Output             1151 ml   Net             -951 ml       Physical Exam:   GENERAL:  Awake responsive follows commands  HEENT: atraumatic, normocephalic  Oral mucosa moist, no icterus, pallor  PERRLA +  Neck supple, no JVD, no lymphadenopathy, no thryomegaly  CHEST: B/L breath sounds heard, occasional wheezing  CVS: S1, S2   ABDOMEN: Soft/obese/NT/BS heard  NEUROLOGICAL: CN II -XI grossly intact  No focal motor or sensory deficits  No signs of meningeal irritation or cerebellar dysfunction  EXTREMITIES:  Left lower extremity dressing clean dry intact  Bilateral pitting pedal edema  LABS:   7/14/2018 05:04   eGFR 27   Sodium 144   Potassium 3 9   Chloride 106   CO2 30   Anion Gap 8   BUN 28 (H)   Creatinine 1 70 (H)   Glucose 107   Calcium 8 6   WBC 11 17 (H)   RBC 2 81 (L)   Hemoglobin 8 9 (L)   Hematocrit 27 9 (L)   MCV 99 (H)   MCH 31 7   MCHC 31 9   RDW 15 7 (H)   Platelets 689   MPV 42 6     Scheduled Meds:    Current Facility-Administered Medications:  acetaminophen 975 mg Oral Q8H [facility] Matt Alanis MD   albuterol 2 5 mg Nebulization Q4H PRN Antionette Farnsworth MD   allopurinol 100 mg Oral Daily Sudarshan Thakkar MD   aluminum-magnesium hydroxide-simethicone 15 mL Oral Q6H PRN [place] Miesha, PA-C   aspirin 325 mg Oral Daily Sudarshan Thakkar MD   atenolol 50 mg Oral Daily Sudarshan Thakkar MD   budesonide-formoterol 2 puff Inhalation BID Sudarshan Thakkar MD   calcium carbonate 1,000 mg Oral Daily PRN [place] Miesha, PA-C   docusate sodium 100 mg Oral BID [place] Miesha, PA-C   enoxaparin 30 mg Subcutaneous Q24H [facility] Sudarshan Maki MD   furosemide 60 mg Oral BID (diuretic) Marky Toth PA-C   ipratropium 0 5 mg Nebulization TID Antionette Farnsworth MD   levalbuterol 1 25 mg Nebulization TID Veronica Messina MD   nystatin 1 application Topical BID Sudarshan Thakkar MD   ondansetron 4 mg Intravenous Q6H PRN Bilal VIVIANA Thakkar MD   oxyCODONE 5 mg Oral Q4H PRN Sudarshan Thakkar MD   pantoprazole 40 mg Oral Early Morning Sudarshan Thakkar MD   polyethylene glycol 17 g Oral Daily Sudarshan Ramsey MD   potassium chloride 10 mEq Oral Every Other Day Biljosseline Ramsey MD   potassium chloride 20 mEq Oral Once Marky Toth PA-C   QUEtiapine 12 5 mg Oral HS PRN Sudarshan Thakkar MD   senna 8 6 mg Oral Daily Biljosseline Thakkar MD   silver sulfadiazine 1 application Topical Daily PRN Sudarshan Thakkar MD   sodium chloride 75 mL/hr Intravenous Continuous Adazee Caldwell MD   traMADol 50 mg Oral Q6H PRN Demetrius Rossi PA-C       Continuous Infusions:    sodium chloride 75 mL/hr       PRN Meds:  albuterol    aluminum-magnesium hydroxide-simethicone    calcium carbonate    ondansetron    oxyCODONE    QUEtiapine    silver sulfadiazine    traMADol    VTE prophylaxis: Enoxaparin    Education and Discussions with Family / Patient:  patient  Current Length of Stay: Day 2     Current Patient Status: Inpatient     Certification Statement: The patient will continue to require additional inpatient hospital stay due to ongoing goals of care discussion      Discharge Plan / Estimated Discharge Date:  Plan for today discuss with case management and RN      Code Status: Level 1 - Full Code    Time Spent for Care: 20 minutes   More than 50% of total time spent on counseling and coordination of care as described above  Reinaldo Danielson MD  HOSPITALIST SERVICES  7/14/2018    PLEASE NOTE:  This encounter was completed utilizing the [place] Direct Speech Voice Recognition Software  Grammatical errors, random word insertions, pronoun errors and incomplete sentences are occasional consequences of the system due to software limitations, ambient noise and hardware issues  These may be missed by proof reading prior to affixing electronic signature   Any questions or concerns about the content, text or information contained within the body of this dictation should be directly addressed to the physician for clarification  Please do not hesitate to call me directly if you have any any questions or concerns

## 2018-07-14 NOTE — PHYSICAL THERAPY NOTE
Physical Therapy Progress Note     07/14/18 1148   Pain Assessment   Pain Assessment FLACC   Pain Location Hip   Pain Orientation Left   Pain Rating: FLACC (Rest) - Face 0   Pain Rating: FLACC (Rest) - Legs 0   Pain Rating: FLACC (Rest) - Activity 0   Pain Rating: FLACC (Rest) - Cry 0   Pain Rating: FLACC (Rest) - Consolability 0   Score: FLACC (Rest) 0   Pain Rating: FLACC (Activity) - Face 1   Pain Rating: FLACC (Activity) - Legs 1   Pain Rating: FLACC (Activity) - Activity 1   Pain Rating: FLACC (Activity) - Cry 1   Pain Rating: FLACC (Activity) - Consolability 1   Score: FLACC (Activity) 5   Restrictions/Precautions   Weight Bearing Precautions Per Order Yes   RUE Weight Bearing Per Order WBAT   LUE Weight Bearing Per Order WBAT   RLE Weight Bearing Per Order WBAT   LLE Weight Bearing Per Order WBAT   Other Precautions Chair Alarm; Bed Alarm;Cognitive;THR;WBS;Fall Risk;Pain   General   Chart Reviewed Yes   Family/Caregiver Present Yes  (granddaughter)   Cognition   Overall Cognitive Status Impaired   Arousal/Participation Lethargic;Arousable   Attention Attends with cues to redirect   Orientation Level Oriented to person   Memory Decreased long term memory   Following Commands Follows one step commands with increased time or repetition   Bed Mobility   Supine to Sit 3  Moderate assistance   Additional items Assist x 2   Sit to Supine 3  Moderate assistance   Additional items Assist x 2   Transfers   Sit to Stand 4  Minimal assistance   Additional items Assist x 2   Stand to Sit 4  Minimal assistance   Additional items Assist x 2   Stand pivot 3  Moderate assistance   Additional items Assist x 2   Ambulation/Elevation   Gait pattern Improper Weight shift; Forward Flexion;Decreased foot clearance;Decreased L stance;Shuffling; Step to;Excessively slow   Gait Assistance 3  Moderate assist   Additional items Assist x 2;Verbal cues; Tactile cues   Assistive Device Rolling walker   Distance 2'   Endurance Deficit Endurance Deficit Yes   Activity Tolerance   Activity Tolerance Patient limited by fatigue;Patient limited by pain   Nurse Made Aware RN [id number] [facility] ok to see   Assessment   Prognosis Good   Problem List Decreased strength;Decreased range of motion;Decreased endurance; Impaired balance;Decreased mobility;Pain;Orthopedic restrictions;Decreased coordination;Decreased cognition; Impaired judgement   Assessment Pt supine in bed upon arrival, confused about her location and situation  Agreeable to OOB to chair  Pt requires min assist x2 for bed mobility, c/o hip pain with moevemnt  Educated on pursed lips breathing  Mod assist x2 for transfers as well as ambuyaltion  Pt was able to take a few steps toward the chair but had c/o increased pain and did not want to weight bear through her left leg, chair was brought up behind pt  This pt will continue to bebenfit from skilled PT to increase strength and endurance in order to maxmize safe fucntional mobility  Goals   Patient Goals none stated   STG Expiration Date 07/23/18   Plan   Treatment/Interventions Functional transfer training;LE strengthening/ROM; Elevations; Therapeutic exercise; Endurance training;Bed mobility;Gait training   PT Frequency (5-7x/week)   Recommendation   Recommendation (rehab)   Equipment Recommended Conan Boxer   PT - OK to Discharge Yes  (to rehab)     Flaco Gamez, PTA

## 2018-07-14 NOTE — SOCIAL WORK
Cm met with pt's dtr as it was noted the pt was confused  Pt loves at [facility] in [place]; in their locked memory unit  Pt is an assist of 1 for all mobilities there  Pt uses either a walker or wheelchair for locomotion  Pt receives her medications on site  Pt has no hx of mental health or substance abuse  Pt has no living will  Pt was at [address] in the past and would like referral there if rehab is needed  Family also open to SNF in the [place] area if [facility]U can't accept   CW CM will follow

## 2018-07-15 LAB
ABO GROUP BLD BPU: NORMAL
ABO GROUP BLD BPU: NORMAL
ANION GAP SERPL CALCULATED.3IONS-SCNC: 3 MMOL/L (ref 4–13)
BPU ID: NORMAL
BPU ID: NORMAL
BUN SERPL-MCNC: 33 MG/DL (ref 5–25)
CALCIUM SERPL-MCNC: 8.4 MG/DL (ref 8.3–10.1)
CHLORIDE SERPL-SCNC: 110 MMOL/L (ref 100–108)
CO2 SERPL-SCNC: 33 MMOL/L (ref 21–32)
CREAT SERPL-MCNC: 1.69 MG/DL (ref 0.6–1.3)
ERYTHROCYTE [DISTWIDTH] IN BLOOD BY AUTOMATED COUNT: 15.7 % (ref 11.6–15.1)
GFR SERPL CREATININE-BSD FRML MDRD: 27 ML/MIN/1.73SQ M
GLUCOSE SERPL-MCNC: 99 MG/DL (ref 65–140)
HCT VFR BLD AUTO: 26.6 % (ref 34.8–46.1)
HCT VFR BLD AUTO: 30.5 % (ref 34.8–46.1)
HGB BLD-MCNC: 8.3 G/DL (ref 11.5–15.4)
HGB BLD-MCNC: 9.5 G/DL (ref 11.5–15.4)
MCH RBC QN AUTO: 32 PG (ref 26.8–34.3)
MCHC RBC AUTO-ENTMCNC: 31.2 G/DL (ref 31.4–37.4)
MCV RBC AUTO: 103 FL (ref 82–98)
PLATELET # BLD AUTO: 265 THOUSANDS/UL (ref 149–390)
PMV BLD AUTO: 9.9 FL (ref 8.9–12.7)
POTASSIUM SERPL-SCNC: 4.3 MMOL/L (ref 3.5–5.3)
RBC # BLD AUTO: 2.59 MILLION/UL (ref 3.81–5.12)
SODIUM SERPL-SCNC: 146 MMOL/L (ref 136–145)
UNIT DISPENSE STATUS: NORMAL
UNIT DISPENSE STATUS: NORMAL
UNIT PRODUCT CODE: NORMAL
UNIT PRODUCT CODE: NORMAL
UNIT RH: NORMAL
UNIT RH: NORMAL
WBC # BLD AUTO: 9.81 THOUSAND/UL (ref 4.31–10.16)

## 2018-07-15 PROCEDURE — 94640 AIRWAY INHALATION TREATMENT: CPT

## 2018-07-15 PROCEDURE — 99024 POSTOP FOLLOW-UP VISIT: CPT | Performed by: PHYSICIAN ASSISTANT

## 2018-07-15 PROCEDURE — P9016 RBC LEUKOCYTES REDUCED: HCPCS

## 2018-07-15 PROCEDURE — 85014 HEMATOCRIT: CPT | Performed by: ORTHOPAEDIC SURGERY

## 2018-07-15 PROCEDURE — 85027 COMPLETE CBC AUTOMATED: CPT | Performed by: PHYSICIAN ASSISTANT

## 2018-07-15 PROCEDURE — 99232 SBSQ HOSP IP/OBS MODERATE 35: CPT | Performed by: INTERNAL MEDICINE

## 2018-07-15 PROCEDURE — 94760 N-INVAS EAR/PLS OXIMETRY 1: CPT

## 2018-07-15 PROCEDURE — 80048 BASIC METABOLIC PNL TOTAL CA: CPT | Performed by: INTERNAL MEDICINE

## 2018-07-15 PROCEDURE — 85018 HEMOGLOBIN: CPT | Performed by: ORTHOPAEDIC SURGERY

## 2018-07-15 RX ADMIN — SENNOSIDES 8.6 MG: 8.6 TABLET, FILM COATED ORAL at 09:20

## 2018-07-15 RX ADMIN — PANTOPRAZOLE SODIUM 40 MG: 40 TABLET, DELAYED RELEASE ORAL at 06:30

## 2018-07-15 RX ADMIN — IPRATROPIUM BROMIDE 0.5 MG: 0.5 SOLUTION RESPIRATORY (INHALATION) at 07:54

## 2018-07-15 RX ADMIN — ENOXAPARIN SODIUM 30 MG: 30 INJECTION, SOLUTION INTRAVENOUS; SUBCUTANEOUS at 09:19

## 2018-07-15 RX ADMIN — ACETAMINOPHEN 975 MG: 325 TABLET ORAL at 06:38

## 2018-07-15 RX ADMIN — DOCUSATE SODIUM 100 MG: 100 CAPSULE, LIQUID FILLED ORAL at 09:19

## 2018-07-15 RX ADMIN — ACETAMINOPHEN 975 MG: 325 TABLET ORAL at 21:41

## 2018-07-15 RX ADMIN — DOCUSATE SODIUM 100 MG: 100 CAPSULE, LIQUID FILLED ORAL at 18:33

## 2018-07-15 RX ADMIN — FUROSEMIDE 60 MG: 40 TABLET ORAL at 09:20

## 2018-07-15 RX ADMIN — POLYETHYLENE GLYCOL 3350 17 G: 17 POWDER, FOR SOLUTION ORAL at 09:19

## 2018-07-15 RX ADMIN — NYSTATIN 1 APPLICATION: 100000 POWDER TOPICAL at 18:35

## 2018-07-15 RX ADMIN — ATENOLOL 50 MG: 50 TABLET ORAL at 09:19

## 2018-07-15 RX ADMIN — LEVALBUTEROL HYDROCHLORIDE 1.25 MG: 1.25 SOLUTION, CONCENTRATE RESPIRATORY (INHALATION) at 07:54

## 2018-07-15 RX ADMIN — BUDESONIDE AND FORMOTEROL FUMARATE DIHYDRATE 2 PUFF: 160; 4.5 AEROSOL RESPIRATORY (INHALATION) at 09:23

## 2018-07-15 RX ADMIN — IPRATROPIUM BROMIDE 0.5 MG: 0.5 SOLUTION RESPIRATORY (INHALATION) at 20:18

## 2018-07-15 RX ADMIN — LEVALBUTEROL HYDROCHLORIDE 1.25 MG: 1.25 SOLUTION, CONCENTRATE RESPIRATORY (INHALATION) at 14:57

## 2018-07-15 RX ADMIN — ASPIRIN 325 MG: 325 TABLET ORAL at 09:19

## 2018-07-15 RX ADMIN — TRAMADOL HYDROCHLORIDE 50 MG: 50 TABLET, FILM COATED ORAL at 09:19

## 2018-07-15 RX ADMIN — ACETAMINOPHEN 975 MG: 325 TABLET ORAL at 13:31

## 2018-07-15 RX ADMIN — LEVALBUTEROL HYDROCHLORIDE 1.25 MG: 1.25 SOLUTION, CONCENTRATE RESPIRATORY (INHALATION) at 20:18

## 2018-07-15 RX ADMIN — IPRATROPIUM BROMIDE 0.5 MG: 0.5 SOLUTION RESPIRATORY (INHALATION) at 14:57

## 2018-07-15 RX ADMIN — ALLOPURINOL 100 MG: 100 TABLET ORAL at 09:19

## 2018-07-15 RX ADMIN — BUDESONIDE AND FORMOTEROL FUMARATE DIHYDRATE 2 PUFF: 160; 4.5 AEROSOL RESPIRATORY (INHALATION) at 18:35

## 2018-07-15 RX ADMIN — NYSTATIN 1 APPLICATION: 100000 POWDER TOPICAL at 09:23

## 2018-07-15 NOTE — PROGRESS NOTES
Progress Note - Sadiq Perez 11/23/1931, 80 y o  female MRN: [id number]    Unit/Bed#: CW3 337-01 Encounter: [id number]    Primary Care Provider: Michelle Zamora   Date and time admitted to hospital: 7/12/2018 12:44 AM        * Displaced intertrochanteric fracture of left femur, initial encounter for closed fracture [facility])   Assessment & Plan    POD #2 left hip hemiarthroplasty  Continue as per Orthopedic surgery  Fall from bed   Assessment & Plan    Likely mechanical  recent hip surgery now with fall and possible displacement of hardware  Pain controlled        Chronic diastolic congestive heart failure (HCC)   Assessment & Plan    Continue atenolol, Lasix  No ACEI or ARB 2/2 CKD  Appreciate cards input, may need further diuresis post op - discontinue post op fluids           HTN (hypertension)   Assessment & Plan    Patient will continue at atenolol, Lasix  Atrial fibrillation   Assessment & Plan    Permanent afib  Seen by cardiology last admission prior to OR  Rate controlled on tele, not on AC due to history of ICH in the past        Stage 4 chronic kidney disease ([facility] [address] )   Assessment & Plan    Baseline creatinine 2-2 5   Stable, monitor labs         Alzheimer disease   Assessment & Plan    Alert but oriented to self only  Regular sleep/wake cycles  High risk for delirium  Seroquel ordered for bedtime as needed         Oropharyngeal dysphagia   Assessment & Plan    Continue dysphagia pureed with HTL          SUBJECTIVE:   Patient seen and examined  She appears comfortable  She states she feels a lot better compared to yesterday  Hemoglobin 8 3      OBJECTIVE:   Vitals:    07/15/18 0755   BP:    Pulse:    Resp:    Temp:    SpO2: 98%       Intake/Output Summary (Last 24 hours) at 07/15/18 1016  Last data filed at 07/15/18 0830   Gross per 24 hour   Intake              355 ml   Output              400 ml   Net              -45 ml     Physical Exam:   GENERAL:  Awake responsive follows commands, pleasantly confused  HEENT: atraumatic, normocephalic  Oral mucosa moist, no icterus, pallor  PERRLA +  Neck supple, no JVD, no lymphadenopathy, no thryomegaly  CHEST: B/L breath sounds heard, occasional wheezing  CVS: S1, S2   ABDOMEN: Soft/obese/NT/BS heard  NEUROLOGICAL: CN II -XI grossly intact  No focal motor or sensory deficits  No signs of meningeal irritation or cerebellar dysfunction  EXTREMITIES:  Left lower extremity dressing clean dry intact  Bilateral pitting pedal edema      LABS:   7/15/2018 06:37   eGFR 27   Sodium 146 (H)   Potassium 4 3   Chloride 110 (H)   CO2 33 (H)   Anion Gap 3 (L)   BUN 33 (H)   Creatinine 1 69 (H)   Glucose 99   Calcium 8 4   WBC 9 81   RBC 2 59 (L)   Hemoglobin 8 3 (L)   Hematocrit 26 6 (L)    (H)   MCH 32 0   MCHC 31 2 (L)   RDW 15 7 (H)   Platelets 061   MPV 9 9     Scheduled Meds:  Current Facility-Administered Medications:  acetaminophen 975 mg Oral Q8H [address] Matt Alanis MD   albuterol 2 5 mg Nebulization Q4H PRN Antionette Farnsworth MD   allopurinol 100 mg Oral Daily Sudarshan Thakkar MD   aluminum-magnesium hydroxide-simethicone 15 mL Oral Q6H PRN Raza James PA-C   aspirin 325 mg Oral Daily Sudarshan Thakkar MD   atenolol 50 mg Oral Daily Sudarshan Thakkar MD   budesonide-formoterol 2 puff Inhalation BID Sudarshan Thakkar MD   calcium carbonate 1,000 mg Oral Daily PRN Raza James PA-C   docusate sodium 100 mg Oral BID Raza James PA-C   enoxaparin 30 mg Subcutaneous Q24H [address] Sudarshan Salomon MD   furosemide 60 mg Oral BID (diuretic) Marky Toth PA-C   ipratropium 0 5 mg Nebulization TID Antionette Farnsworth MD   levalbuterol 1 25 mg Nebulization TID Maurice Santamaria MD   nystatin 1 application Topical BID Sudarshan Thakkar MD   ondansetron 4 mg Intravenous Q6H PRN Sudarshan Thakkar MD   oxyCODONE 5 mg Oral Q4H PRN Bilal VIVIANA Thakkar MD   pantoprazole 40 mg Oral Early Morning Bilal VIVIANA Thakkar MD   polyethylene glycol 17 g Oral Daily Bilal Ruffin Sacks, MD   potassium chloride 10 mEq Oral Every Other Day Sudarshan Mitchell MD   potassium chloride 20 mEq Oral Once Marky Toth PA-C   QUEtiapine 12 5 mg Oral HS PRN Sudarshan Thakkar MD   senna 8 6 mg Oral Daily Sudarshan Thakkar MD   silver sulfadiazine 1 application Topical Daily PRN Sudarshan Thakkar MD   traMADol 50 mg Oral Q6H PRN Lilo Sandy PA-C     PRN Meds:albuterol    aluminum-magnesium hydroxide-simethicone    calcium carbonate    ondansetron    oxyCODONE    QUEtiapine    silver sulfadiazine    traMADol    VTE prophylaxis: Enoxaparin     Education and Discussions with Family / Patient:  patient      Current Length of Stay: Day  3     Current Patient Status: Inpatient      Certification Statement: The patient will continue to require additional inpatient hospital stay due to ongoing goals of care discussion      Discharge Plan / Estimated Discharge Date:  Plan for today discuss with case management and RN      Code Status: Level 1 - Full Code     Time Spent for Care: 20 minutes   More than 50% of total time spent on counseling and coordination of care as described above      [address], [place]  HOSPITALIST SERVICES  7/15/2018     PLEASE NOTE:  This encounter was completed utilizing the [employer] Direct Speech Voice Recognition Software  Grammatical errors, random word insertions, pronoun errors and incomplete sentences are occasional consequences of the system due to software limitations, ambient noise and hardware issues  These may be missed by proof reading prior to affixing electronic signature  Any questions or concerns about the content, text or information contained within the body of this dictation should be directly addressed to the physician for clarification   Please do not hesitate to call me directly if you have any any questions or concerns

## 2018-07-15 NOTE — PLAN OF CARE
Problem: Prexisting or High Potential for Compromised Skin Integrity  Goal: Skin integrity is maintained or improved  INTERVENTIONS:  - Identify patients at risk for skin breakdown  - Assess and monitor skin integrity  - Assess and monitor nutrition and hydration status  - Monitor labs (i e  albumin)  - Assess for incontinence   - Turn and reposition patient  - Assist with mobility/ambulation  - Relieve pressure over bony prominences  - Avoid friction and shearing  - Provide appropriate hygiene as needed including keeping skin clean and dry  - Evaluate need for skin moisturizer/barrier cream  - Collaborate with interdisciplinary team (i e  Nutrition, Rehabilitation, etc )   - Patient/family teaching   Outcome: Progressing      Problem: PAIN - ADULT  Goal: Verbalizes/displays adequate comfort level or baseline comfort level  Interventions:  - Encourage patient to monitor pain and request assistance  - Assess pain using appropriate pain scale  - Administer analgesics based on type and severity of pain and evaluate response  - Implement non-pharmacological measures as appropriate and evaluate response  - Consider cultural and social influences on pain and pain management  - Notify physician/advanced practitioner if interventions unsuccessful or patient reports new pain   Outcome: Progressing      Problem: INFECTION - ADULT  Goal: Absence or prevention of progression during hospitalization  INTERVENTIONS:  - Assess and monitor for signs and symptoms of infection  - Monitor lab/diagnostic results  - Monitor all insertion sites, i e  indwelling lines, tubes, and drains  - Monitor endotracheal (as able) and nasal secretions for changes in amount and color  - [place] appropriate cooling/warming therapies per order  - Administer medications as ordered  - Instruct and encourage patient and family to use good hand hygiene technique  - Identify and instruct in appropriate isolation precautions for identified infection/condition   Outcome: Progressing    Goal: Absence of fever/infection during neutropenic period  INTERVENTIONS:  - Monitor WBC  - Implement neutropenic guidelines   Outcome: Progressing      Problem: SAFETY ADULT  Goal: Patient will remain free of falls  INTERVENTIONS:  - Assess patient frequently for physical needs  -  Identify cognitive and physical deficits and behaviors that affect risk of falls    -  [place] fall precautions as indicated by assessment   - Educate patient/family on patient safety including physical limitations  - Instruct patient to call for assistance with activity based on assessment  - Modify environment to reduce risk of injury  - Consider OT/PT consult to assist with strengthening/mobility    Outcome: Progressing    Goal: Maintain or return to baseline ADL function  INTERVENTIONS:  -  Assess patient's ability to carry out ADLs; assess patient's baseline for ADL function and identify physical deficits which impact ability to perform ADLs (bathing, care of mouth/teeth, toileting, grooming, dressing, etc )  - Assess/evaluate cause of self-care deficits   - Assess range of motion  - Assess patient's mobility; develop plan if impaired  - Assess patient's need for assistive devices and provide as appropriate  - Encourage maximum independence but intervene and supervise when necessary  ¯ Involve family in performance of ADLs  ¯ Assess for home care needs following discharge   ¯ Request OT consult to assist with ADL evaluation and planning for discharge  ¯ Provide patient education as appropriate   Outcome: Progressing    Goal: Maintain or return mobility status to optimal level  INTERVENTIONS:  - Assess patient's baseline mobility status (ambulation, transfers, stairs, etc )    - Identify cognitive and physical deficits and behaviors that affect mobility  - Identify mobility aids required to assist with transfers and/or ambulation (gait belt, sit-to-stand, lift, walker, cane, etc )  - [place] fall precautions as indicated by assessment  - Record patient progress and toleration of activity level on Mobility SBAR; progress patient to next Phase/Stage  - Instruct patient to call for assistance with activity based on assessment  - Request Rehabilitation consult to assist with strengthening/weightbearing, etc    Outcome: Progressing      Problem: DISCHARGE PLANNING  Goal: Discharge to home or other facility with appropriate resources  INTERVENTIONS:  - Identify barriers to discharge w/patient and caregiver  - Arrange for needed discharge resources and transportation as appropriate  - Identify discharge learning needs (meds, wound care, etc )  - Arrange for interpretive services to assist at discharge as needed  - Refer to Case Management Department for coordinating discharge planning if the patient needs post-hospital services based on physician/advanced practitioner order or complex needs related to functional status, cognitive ability, or social support system   Outcome: Progressing      Problem: Knowledge Deficit  Goal: Patient/family/caregiver demonstrates understanding of disease process, treatment plan, medications, and discharge instructions  Complete learning assessment and assess knowledge base  Interventions:  - Provide teaching at level of understanding  - Provide teaching via preferred learning methods   Outcome: Progressing      Problem: Potential for Falls  Goal: Patient will remain free of falls  INTERVENTIONS:  - Assess patient frequently for physical needs  -  Identify cognitive and physical deficits and behaviors that affect risk of falls    -  [place] fall precautions as indicated by assessment   - Educate patient/family on patient safety including physical limitations  - Instruct patient to call for assistance with activity based on assessment  - Modify environment to reduce risk of injury  - Consider OT/PT consult to assist with strengthening/mobility    Outcome: Progressing      Problem: SAFETY,RESTRAINT: NV/NON-SELF DESTRUCTIVE BEHAVIOR  Goal: Remains free of harm/injury (restraint for non violent/non self-detsructive behavior)  INTERVENTIONS:  - Instruct patient/family regarding restraint use   - Assess and monitor physiologic and psychological status   - Provide interventions and comfort measures to meet assessed patient needs   - Identify and implement measures to help patient regain control  - Assess readiness for release of restraint    Outcome: Progressing    Goal: Returns to optimal restraint-free functioning  INTERVENTIONS:  - Assess the patient's behavior and symptoms that indicate continued need for restraint  - Identify and implement measures to help patient regain control  - Assess readiness for release of restraint    Outcome: Progressing

## 2018-07-15 NOTE — PROGRESS NOTES
Orthopedics   Margarito Tabor 80 y o  female MRN: [id number]  Unit/Bed#: CW3 337-01      Subjective:  80 y  o female post operative day 2 left hip removal of hardware and conversion to a hemiarthroplasty  Pt doing well  Pain controlled  NO overnight events  Labs:    0  Lab Value Date/Time   HCT 26 6 (L) 07/15/2018 0637   HCT 27 9 (L) 07/14/2018 0504   HCT 30 6 (L) 07/13/2018 1216   HCT 24 3 (L) 06/02/2018 0515   HGB 8 3 (L) 07/15/2018 0637   HGB 8 9 (L) 07/14/2018 0504   HGB 9 3 (L) 07/13/2018 1216   HGB 8 1 (L) 06/02/2018 0515   INR 1 15 07/13/2018 0536   WBC 9 81 07/15/2018 0637   WBC 11 17 (H) 07/14/2018 0504   WBC 13 21 (H) 07/13/2018 1216   WBC 11 2 (H) 06/02/2018 0515   ESR 92 (H) 05/29/2018 1353       Meds:    Current Facility-Administered Medications:     acetaminophen (TYLENOL) tablet 975 mg, 975 mg, Oral, Q8H [facility], Matt Alanis MD, 975 mg at 07/15/18 4008    albuterol inhalation solution 2 5 mg, 2 5 mg, Nebulization, Q4H PRN, Antionette Farnsworth MD    allopurinol (ZYLOPRIM) tablet 100 mg, 100 mg, Oral, Daily, Sudarshan Thakkar MD, 100 mg at 07/14/18 0844    aluminum-magnesium hydroxide-simethicone (MYLANTA) 200-200-20 mg/5 mL oral suspension 15 mL, 15 mL, Oral, Q6H PRN, Jet Carrillo PA-C    aspirin tablet 325 mg, 325 mg, Oral, Daily, Sudarshan Mckenzie MD, 325 mg at 07/14/18 0844    atenolol (TENORMIN) tablet 50 mg, 50 mg, Oral, Daily, Sudarshan Mckenzie MD, 50 mg at 07/14/18 0851    budesonide-formoterol (SYMBICORT) 160-4 5 mcg/act inhaler 2 puff, 2 puff, Inhalation, BID, Sudarshan Thakkar MD, 2 puff at 07/14/18 1719    calcium carbonate (TUMS) chewable tablet 1,000 mg, 1,000 mg, Oral, Daily PRN, Geradine Dyanita PA-CORI    docusate sodium (COLACE) capsule 100 mg, 100 mg, Oral, BID, Gercharisse Carrillo PA-C, 100 mg at 07/14/18 1718    enoxaparin (LOVENOX) subcutaneous injection 30 mg, 30 mg, Subcutaneous, Q24H DIDI, Sudarshan Thakkar MD, 30 mg at 07/14/18 0850    furosemide (LASIX) tablet 60 mg, 60 mg, Oral, BID (diuretic), Marky Toth PA-C, 60 mg at 07/14/18 0844    ipratropium (ATROVENT) 0 02 % inhalation solution 0 5 mg, 0 5 mg, Nebulization, TID, Antionette Farnsworth MD, 0 5 mg at 07/15/18 0754    levalbuterol (XOPENEX) inhalation solution 1 25 mg, 1 25 mg, Nebulization, TID, Antionette Farnsworth MD, 1 25 mg at 07/15/18 0754    nystatin (MYCOSTATIN) powder 1 application, 1 application, Topical, BID, Sudarshan Rocha MD, 1 application at 90/05/27 1723    ondansetron (ZOFRAN) injection 4 mg, 4 mg, Intravenous, Q6H PRN, Sudarshan Thakkar MD    oxyCODONE (ROXICODONE) IR tablet 5 mg, 5 mg, Oral, Q4H PRN, Sudarshan Thakkar MD    pantoprazole (PROTONIX) EC tablet 40 mg, 40 mg, Oral, Early Morning, Sudarshan Rocha MD, 40 mg at 07/15/18 0630    polyethylene glycol (MIRALAX) packet 17 g, 17 g, Oral, Daily, Sudarshan Thakkar MD    potassium chloride (K-DUR,KLOR-CON) CR tablet 10 mEq, 10 mEq, Oral, Every Other Day, Sudarshan Rocha MD, 10 mEq at 07/14/18 0844    potassium chloride (K-DUR,KLOR-CON) CR tablet 20 mEq, 20 mEq, Oral, Once, Marky Toth PA-C    QUEtiapine (SEROquel) tablet 12 5 mg, 12 5 mg, Oral, HS PRN, Sudarshan Rocha MD, 12 5 mg at 07/14/18 0842    senna (SENOKOT) tablet 8 6 mg, 8 6 mg, Oral, Daily, Sudarshan Rocha MD, 8 6 mg at 07/14/18 0845    silver sulfadiazine (SILVADENE,SSD) 1 % cream 1 application, 1 application, Topical, Daily PRN, Sudarshan Rocha MD, 1 application at 06/55/16 0850    traMADol (ULTRAM) tablet 50 mg, 50 mg, Oral, Q6H PRN, [place]aine [place] PA-C, 50 mg at 07/14/18 0716    Blood Culture:   No results found for: BLOODCX    Wound Culture:   Lab Results   Component Value Date    WOUNDCULT 2+ Growth of Staphylococcus aureus (A) 02/15/2018       Ins and Outs:  I/O last 24 hours:   In: 395 [P O :395]  Out: 298 [Urine:298]          Physical:  Vitals:    07/15/18 0755   BP:    Pulse:    Resp:    Temp:    SpO2: 98%     left lower extremity  · Dressing with some drainage, intact  · Sensation intact L1-S1  · Motor intact to knee flexion/extension, EHL/FHL  · 2+ dorsalis pedis pulse    _*_*_*_*_*_*_*_*_*_*_*_*_*_*_*_*_*_*_*_*_*_*_*_*_*_*_*_*_*_*_*_*_*_*_*_*_*_*_*_*_*    Assessment: 80 y  o female post operative day 2 left hip hemiarthroplasty       Plan:  · Up and out of bed  · Weightbearing as tolerated  · PT/OT  · DVT prophylaxis  · Analgesics  · Dispo: [id number] [address] Walker for discharge from ortho perspective  · Patient noted to have acute blood loss anemia due to a drop in Hbg of > 2 0g from preop levels, will monitor vital signs and resuscitate with IV fluids as needed     Fatemeh Zheng PA-C

## 2018-07-16 PROBLEM — D68.9 COAGULOPATHY (HCC): Status: RESOLVED | Noted: 2018-01-12 | Resolved: 2018-07-16

## 2018-07-16 LAB
ABO GROUP BLD BPU: NORMAL
ANION GAP SERPL CALCULATED.3IONS-SCNC: 7 MMOL/L (ref 4–13)
BPU ID: NORMAL
BUN SERPL-MCNC: 33 MG/DL (ref 5–25)
CALCIUM SERPL-MCNC: 8.3 MG/DL (ref 8.3–10.1)
CHLORIDE SERPL-SCNC: 109 MMOL/L (ref 100–108)
CO2 SERPL-SCNC: 30 MMOL/L (ref 21–32)
CREAT SERPL-MCNC: 1.57 MG/DL (ref 0.6–1.3)
ERYTHROCYTE [DISTWIDTH] IN BLOOD BY AUTOMATED COUNT: 16.3 % (ref 11.6–15.1)
GFR SERPL CREATININE-BSD FRML MDRD: 30 ML/MIN/1.73SQ M
GLUCOSE SERPL-MCNC: 83 MG/DL (ref 65–140)
HCT VFR BLD AUTO: 30.1 % (ref 34.8–46.1)
HGB BLD-MCNC: 9.4 G/DL (ref 11.5–15.4)
MCH RBC QN AUTO: 31.6 PG (ref 26.8–34.3)
MCHC RBC AUTO-ENTMCNC: 31.2 G/DL (ref 31.4–37.4)
MCV RBC AUTO: 101 FL (ref 82–98)
PLATELET # BLD AUTO: 303 THOUSANDS/UL (ref 149–390)
PMV BLD AUTO: 10.4 FL (ref 8.9–12.7)
POTASSIUM SERPL-SCNC: 3.8 MMOL/L (ref 3.5–5.3)
RBC # BLD AUTO: 2.97 MILLION/UL (ref 3.81–5.12)
SODIUM SERPL-SCNC: 146 MMOL/L (ref 136–145)
UNIT DISPENSE STATUS: NORMAL
UNIT PRODUCT CODE: NORMAL
UNIT RH: NORMAL
WBC # BLD AUTO: 9.81 THOUSAND/UL (ref 4.31–10.16)

## 2018-07-16 PROCEDURE — 85027 COMPLETE CBC AUTOMATED: CPT | Performed by: PHYSICIAN ASSISTANT

## 2018-07-16 PROCEDURE — 80048 BASIC METABOLIC PNL TOTAL CA: CPT | Performed by: INTERNAL MEDICINE

## 2018-07-16 PROCEDURE — 94760 N-INVAS EAR/PLS OXIMETRY 1: CPT

## 2018-07-16 PROCEDURE — 94640 AIRWAY INHALATION TREATMENT: CPT

## 2018-07-16 PROCEDURE — 99232 SBSQ HOSP IP/OBS MODERATE 35: CPT | Performed by: INTERNAL MEDICINE

## 2018-07-16 PROCEDURE — 97530 THERAPEUTIC ACTIVITIES: CPT | Performed by: STUDENT IN AN ORGANIZED HEALTH CARE EDUCATION/TRAINING PROGRAM

## 2018-07-16 PROCEDURE — 97535 SELF CARE MNGMENT TRAINING: CPT | Performed by: STUDENT IN AN ORGANIZED HEALTH CARE EDUCATION/TRAINING PROGRAM

## 2018-07-16 RX ADMIN — NYSTATIN 1 APPLICATION: 100000 POWDER TOPICAL at 09:19

## 2018-07-16 RX ADMIN — ALLOPURINOL 100 MG: 100 TABLET ORAL at 09:18

## 2018-07-16 RX ADMIN — ACETAMINOPHEN 975 MG: 325 TABLET ORAL at 05:15

## 2018-07-16 RX ADMIN — ASPIRIN 325 MG: 325 TABLET ORAL at 09:18

## 2018-07-16 RX ADMIN — ATENOLOL 50 MG: 50 TABLET ORAL at 09:18

## 2018-07-16 RX ADMIN — LEVALBUTEROL HYDROCHLORIDE 1.25 MG: 1.25 SOLUTION, CONCENTRATE RESPIRATORY (INHALATION) at 13:40

## 2018-07-16 RX ADMIN — DOCUSATE SODIUM 100 MG: 100 CAPSULE, LIQUID FILLED ORAL at 09:19

## 2018-07-16 RX ADMIN — IPRATROPIUM BROMIDE 0.5 MG: 0.5 SOLUTION RESPIRATORY (INHALATION) at 07:16

## 2018-07-16 RX ADMIN — BUDESONIDE AND FORMOTEROL FUMARATE DIHYDRATE 2 PUFF: 160; 4.5 AEROSOL RESPIRATORY (INHALATION) at 09:19

## 2018-07-16 RX ADMIN — DOCUSATE SODIUM 100 MG: 100 CAPSULE, LIQUID FILLED ORAL at 17:55

## 2018-07-16 RX ADMIN — PANTOPRAZOLE SODIUM 40 MG: 40 TABLET, DELAYED RELEASE ORAL at 05:11

## 2018-07-16 RX ADMIN — LEVALBUTEROL HYDROCHLORIDE 1.25 MG: 1.25 SOLUTION, CONCENTRATE RESPIRATORY (INHALATION) at 07:16

## 2018-07-16 RX ADMIN — FUROSEMIDE 60 MG: 40 TABLET ORAL at 17:55

## 2018-07-16 RX ADMIN — IPRATROPIUM BROMIDE 0.5 MG: 0.5 SOLUTION RESPIRATORY (INHALATION) at 20:09

## 2018-07-16 RX ADMIN — ENOXAPARIN SODIUM 30 MG: 30 INJECTION, SOLUTION INTRAVENOUS; SUBCUTANEOUS at 09:19

## 2018-07-16 RX ADMIN — NYSTATIN 1 APPLICATION: 100000 POWDER TOPICAL at 17:53

## 2018-07-16 RX ADMIN — LEVALBUTEROL HYDROCHLORIDE 1.25 MG: 1.25 SOLUTION, CONCENTRATE RESPIRATORY (INHALATION) at 20:09

## 2018-07-16 RX ADMIN — ACETAMINOPHEN 975 MG: 325 TABLET ORAL at 17:55

## 2018-07-16 RX ADMIN — IPRATROPIUM BROMIDE 0.5 MG: 0.5 SOLUTION RESPIRATORY (INHALATION) at 13:40

## 2018-07-16 RX ADMIN — SENNOSIDES 8.6 MG: 8.6 TABLET, FILM COATED ORAL at 09:19

## 2018-07-16 RX ADMIN — BUDESONIDE AND FORMOTEROL FUMARATE DIHYDRATE 2 PUFF: 160; 4.5 AEROSOL RESPIRATORY (INHALATION) at 17:59

## 2018-07-16 RX ADMIN — ACETAMINOPHEN 975 MG: 325 TABLET ORAL at 21:58

## 2018-07-16 RX ADMIN — POTASSIUM CHLORIDE 10 MEQ: 750 TABLET, EXTENDED RELEASE ORAL at 09:18

## 2018-07-16 RX ADMIN — FUROSEMIDE 60 MG: 40 TABLET ORAL at 09:17

## 2018-07-16 RX ADMIN — QUETIAPINE FUMARATE 12.5 MG: 25 TABLET ORAL at 21:58

## 2018-07-16 NOTE — OCCUPATIONAL THERAPY NOTE
07/16/18 0905   Restrictions/Precautions   Weight Bearing Precautions Per Order Yes   RUE Weight Bearing Per Order WBAT   LUE Weight Bearing Per Order WBAT   RLE Weight Bearing Per Order WBAT   LLE Weight Bearing Per Order WBAT   Other Precautions Cognitive; Chair Alarm; Bed Alarm;WBS;THR;Fall Risk;Pain;Hard of hearing   Pain Assessment   Pain Assessment FLACC   Pain Score 2   Pain Rating: FLACC (Rest) - Face 0   Pain Rating: FLACC (Rest) - Legs 0   Pain Rating: FLACC (Rest) - Activity 0   Pain Rating: FLACC (Rest) - Cry 0   Pain Rating: FLACC (Rest) - Consolability 0   Score: FLACC (Rest) 0   Pain Rating: FLACC (Activity) - Face 1   Pain Rating: FLACC (Activity) - Legs 0   Pain Rating: FLACC (Activity) - Activity 0   Pain Rating: FLACC (Activity) - Cry 1   Pain Rating: FLACC (Activity) - Consolability 0   Score: FLACC (Activity) 2   ADL   Where Assessed Edge of bed   Grooming Assistance 5  Supervision/Setup   Grooming Deficit Setup   Grooming Comments oral care dentures   UB Dressing Assistance 5  Supervision/Setup   UB Dressing Deficit Setup   LB Dressing Assistance 1  Total Assistance   LB Dressing Deficit Setup;Verbal cueing;Supervision/safety; Don/doff R sock; Don/doff L sock; Use of adaptive equipment   LB Dressing Comments [address]  1  Total Assistance   Toileting Deficit Setup; Increased time to complete;Use of bedpan/urinal setup   Toileting Comments bladder management   Functional Standing Tolerance   Time 30 sec x2   Activity static standing RW    Comments max Ax1   Bed Mobility   Supine to Sit 2  Maximal assistance   Additional items Assist x 1   Sit to Supine 2  Maximal assistance   Additional items Assist x 1   Transfers   Sit to Stand 2  Maximal assistance   Additional items Assist x 1   Stand to Sit 2  Maximal assistance   Additional items Assist x 1   Cognition   Overall Cognitive Status Impaired   Arousal/Participation Arousable   Attention Attends with cues to redirect Orientation Level Oriented to person   Memory Decreased recall of precautions;Decreased short term memory;Decreased recall of recent events   Following Commands Follows one step commands with increased time or repetition   Activity Tolerance   Activity Tolerance Treatment limited secondary to agitation   Assessment   Assessment Pt participates in OT session with focus on dressing, toileting, bed mobility, and standing tolerance to increase I for d/c  Upon arrival, pt found to be crossing her legs at the ankles and pt educated and reminded not to cross as part of her THR precautions  Pt states t/o session: "Get me out of here!" Pt requires encouragement and vc to participate in therapy  Pt dependent toileting for bladder management with use of bed pan  Pt max Ax1 bed rolling side the side with bed railing support  Pt max Ax1 supine to sit EOB with HOB elevated  Pt max Ax1 sit to stand with RW support and requires vc for safe hand placement  Pt able to take a few steps laterally while standing alongside EOB  Pt shown Shekhar Westfall, however pt unable to use effectively and is dependent LB dressing to don/doff socks  Pt returned to supine position in bed with HOB elevated and bed alarm turned on post session  Pt will continue to benefit from activity tolerance, adls, and transfers  Plan   Treatment Interventions ADL retraining;Functional transfer training; Endurance training; Activityengagement   Goal Expiration Date 07/28/18   Treatment Day 1   OT Frequency 2-3x/wk   Recommendation   OT Discharge Recommendation Short Term Rehab

## 2018-07-16 NOTE — CASE MANAGEMENT
Continued Stay Review    Date: 7/16    Vital Signs:    07/16/18 0735   BP: 133/81   Pulse: 83   Resp: 18   Temp: 98 2 °F (36 8 °C)   SpO2: 96%       Medications:   Scheduled Meds:   Current Facility-Administered Medications:  acetaminophen 975 mg Oral Q8H [address]   albuterol 2 5 mg Nebulization Q4H PRN   allopurinol 100 mg Oral Daily   aluminum-magnesium hydroxide-simethicone 15 mL Oral Q6H PRN   aspirin 325 mg Oral Daily   atenolol 50 mg Oral Daily   budesonide-formoterol 2 puff Inhalation BID   calcium carbonate 1,000 mg Oral Daily PRN   docusate sodium 100 mg Oral BID   enoxaparin 30 mg Subcutaneous Q24H DIDI   furosemide 60 mg Oral BID (diuretic)   ipratropium 0 5 mg Nebulization TID   levalbuterol 1 25 mg Nebulization TID   nystatin 1 application Topical BID   ondansetron 4 mg Intravenous Q6H PRN   oxyCODONE 5 mg Oral Q4H PRN   pantoprazole 40 mg Oral Early Morning   polyethylene glycol 17 g Oral Daily   potassium chloride 10 mEq Oral Every Other Day   potassium chloride 20 mEq Oral Once   QUEtiapine 12 5 mg Oral HS PRN   senna 8 6 mg Oral Daily   silver sulfadiazine 1 application Topical Daily PRN   traMADol 50 mg Oral Q6H PRN     Continuous Infusions:    PRN Meds: albuterol    aluminum-magnesium hydroxide-simethicone    calcium carbonate    ondansetron    oxyCODONE    QUEtiapine    silver sulfadiazine    traMADol    Abnormal Labs/Diagnostic Results:   07/16/18 0526     WBC 4 31 - 10 16 Thousand/uL 9 81    RBC 3 81 - 5 12 Million/uL 2 97     Hemoglobin 11 5 - 15 4 g/dL 9 4     Hematocrit 34 8 - 46 1 % 30 1         Age/Sex: 80 y o  female     Assessment/Plan:   * Displaced intertrochanteric fracture of left femur, initial encounter for closed fracture (HCC)   Assessment & Plan     POD #3 left hip hemiarthroplasty  Continue as per Orthopedic surgery           Acute blood loss anemia   Assessment & Plan      Patient received 1 unit of packed red cells on 07/15/2018  Hemoglobin stable at 9 3     Patient has baseline anemia of chronic disease secondary to CKD stage 4  Patient's baseline hemoglobin 8 4 - 9 5          Fall from bed   Assessment & Plan     Likely mechanical   Recent hip surgery now with fall and possible displacement of hardware  Pain controlled           Chronic diastolic congestive heart failure (HCC)   Assessment & Plan     Continue atenolol, Lasix  No ACEI or ARB 2/2 CKD  Appreciate cards input, may need further diuresis post op - discontinue post op fluids              HTN (hypertension)   Assessment & Plan     Patient will continue with atenolol, Lasix           Atrial fibrillation   Assessment & Plan     Permanent afib  Seen by cardiology last admission prior to OR  Rate controlled on tele, not on AC due to history of ICH in the past          Stage 4 chronic kidney disease ([facility] [address] )   Assessment & Plan     Baseline creatinine 2-2 5   Stable, monitor labs           Alzheimer disease   Assessment & Plan     Alert but oriented to self only  Regular sleep/wake cycles  High risk for delirium  Seroquel ordered for bedtime as needed           Oropharyngeal dysphagia   Assessment & Plan     Continue dysphagia pureed with HTL          Subdural hemorrhage   Assessment & Plan       History of subdural hemorrhage, in January 2018 -- currently resolved         VTE prophylaxis: Enoxaparin     Current Length of Stay: Day  4     Current Patient Status: Inpatient      Certification Statement: The patient will continue to require additional inpatient hospital stay due to ongoing goals of care discussion      Discharge Plan: Referral to [facility]

## 2018-07-16 NOTE — PLAN OF CARE
Problem: OCCUPATIONAL THERAPY ADULT  Goal: Performs self-care activities at highest level of function for planned discharge setting  See evaluation for individualized goals  Treatment Interventions: ADL retraining, Functional transfer training, Endurance training, Cognitive reorientation, Patient/family training, Equipment evaluation/education, Compensatory technique education, Activityengagement          See flowsheet documentation for full assessment, interventions and recommendations  Outcome: Progressing  Limitation: Decreased ADL status, Decreased Safe judgement during ADL, Decreased cognition, Decreased endurance, Decreased self-care trans, Decreased high-level ADLs  Prognosis: Good  Assessment: Pt participates in OT session with focus on dressing, toileting, bed mobility, and standing tolerance to increase I for d/c  Upon arrival, pt found to be crossing her legs at the ankles and pt educated and reminded not to cross as part of her THR precautions  Pt states t/o session: "Get me out of here!" Pt requires encouragement and vc to participate in therapy  Pt dependent toileting for bladder management with use of bed pan  Pt max Ax1 bed rolling side the side with bed railing support  Pt max Ax1 supine to sit EOB with HOB elevated  Pt max Ax1 sit to stand with RW support and requires vc for safe hand placement  Pt able to take a few steps laterally while standing alongside EOB  Pt shown Noah Barreto, however pt unable to use effectively and is dependent LB dressing to don/doff socks  Pt returned to supine position in bed with HOB elevated and bed alarm turned on post session  Pt will continue to benefit from activity tolerance, adls, and transfers       OT Discharge Recommendation: Short Term Rehab

## 2018-07-16 NOTE — ASSESSMENT & PLAN NOTE
Patient received 1 unit of packed red cells on 07/15/2018  Hemoglobin stable at 9 3  Patient has baseline anemia of chronic disease secondary to CKD stage 4     Patient's baseline hemoglobin 8 4 - 9 5

## 2018-07-16 NOTE — PROGRESS NOTES
Orthopedics   Nigel Pena 80 y o  female MRN: [id number]  Unit/Bed#: CW3 337-01      Subjective:  80 y  o female post operative day 3 left hip removal of hardware and conversion to a hemiarthroplasty  Patient slept well overnight  No acute complaints    Labs:    0  Lab Value Date/Time   HCT 30 1 (L) 07/16/2018 0526   HCT 30 5 (L) 07/15/2018 1927   HCT 26 6 (L) 07/15/2018 0637   HCT 24 3 (L) 06/02/2018 0515   HGB 9 4 (L) 07/16/2018 0526   HGB 9 5 (L) 07/15/2018 1927   HGB 8 3 (L) 07/15/2018 0637   HGB 8 1 (L) 06/02/2018 0515   INR 1 15 07/13/2018 0536   WBC 9 81 07/16/2018 0526   WBC 9 81 07/15/2018 0637   WBC 11 17 (H) 07/14/2018 0504   WBC 11 2 (H) 06/02/2018 0515   ESR 92 (H) 05/29/2018 1353       Meds:    Current Facility-Administered Medications:     acetaminophen (TYLENOL) tablet 975 mg, 975 mg, Oral, Q8H [facility], Matt Alanis MD, 975 mg at 07/16/18 0515    albuterol inhalation solution 2 5 mg, 2 5 mg, Nebulization, Q4H PRN, Antionette Farnsworth MD    allopurinol (ZYLOPRIM) tablet 100 mg, 100 mg, Oral, Daily, Sudarshan Sotomayor MD, 100 mg at 07/15/18 0919    aluminum-magnesium hydroxide-simethicone (MYLANTA) 200-200-20 mg/5 mL oral suspension 15 mL, 15 mL, Oral, Q6H PRN, Gary Espana PA-C    aspirin tablet 325 mg, 325 mg, Oral, Daily, Sudarshan Sotomayor MD, 325 mg at 07/15/18 0919    atenolol (TENORMIN) tablet 50 mg, 50 mg, Oral, Daily, Sudarshan Sotomayor MD, 50 mg at 07/15/18 0919    budesonide-formoterol (SYMBICORT) 160-4 5 mcg/act inhaler 2 puff, 2 puff, Inhalation, BID, Sudarshan Thakkar MD, 2 puff at 07/15/18 1835    calcium carbonate (TUMS) chewable tablet 1,000 mg, 1,000 mg, Oral, Daily PRN, Gray Espana PA-C    docusate sodium (COLACE) capsule 100 mg, 100 mg, Oral, BID, Gary Espana PA-C, 100 mg at 07/15/18 1833    enoxaparin (LOVENOX) subcutaneous injection 30 mg, 30 mg, Subcutaneous, Q24H [facility], Sudarshan Sotomayor MD, 30 mg at 07/15/18 0919    furosemide (LASIX) tablet 60 mg, 60 mg, Oral, BID (diuretic), Marky Toth PA-C, 60 mg at 07/15/18 0920    ipratropium (ATROVENT) 0 02 % inhalation solution 0 5 mg, 0 5 mg, Nebulization, TID, Antionette Farnsworth MD, 0 5 mg at 07/15/18 2018    levalbuterol (XOPENEX) inhalation solution 1 25 mg, 1 25 mg, Nebulization, TID, Antionette Farnsworth MD, 1 25 mg at 07/15/18 2018    nystatin (MYCOSTATIN) powder 1 application, 1 application, Topical, BID, Sudarshan Frederick MD, 1 application at 14/40/17 1835    ondansetron (ZOFRAN) injection 4 mg, 4 mg, Intravenous, Q6H PRN, Sudarshan Thakkar MD    oxyCODONE (ROXICODONE) IR tablet 5 mg, 5 mg, Oral, Q4H PRN, Sudarshan Thakkar MD    pantoprazole (PROTONIX) EC tablet 40 mg, 40 mg, Oral, Early Morning, Sudarshan Frederick MD, 40 mg at 07/16/18 0511    polyethylene glycol (MIRALAX) packet 17 g, 17 g, Oral, Daily, Sudarshan Frederick MD, 17 g at 07/15/18 0919    potassium chloride (K-DUR,KLOR-CON) CR tablet 10 mEq, 10 mEq, Oral, Every Other Day, Sudarshan Frederick MD, 10 mEq at 07/14/18 0844    potassium chloride (K-DUR,KLOR-CON) CR tablet 20 mEq, 20 mEq, Oral, Once, Marky Toth PA-C    QUEtiapine (SEROquel) tablet 12 5 mg, 12 5 mg, Oral, HS PRN, Sudarshan Frederick MD, 12 5 mg at 07/14/18 0842    senna (SENOKOT) tablet 8 6 mg, 8 6 mg, Oral, Daily, Sudarshan Frederick MD, 8 6 mg at 07/15/18 0920    silver sulfadiazine (SILVADENE,SSD) 1 % cream 1 application, 1 application, Topical, Daily PRN, Sudarshan Frederick MD, 1 application at 60/97/32 0850    traMADol (ULTRAM) tablet 50 mg, 50 mg, Oral, Q6H PRN, Mayte Hinojosa PA-C, 50 mg at 07/15/18 0919    Blood Culture:   No results found for: BLOODCX    Wound Culture:   Lab Results   Component Value Date    WOUNDCULT 2+ Growth of Staphylococcus aureus (A) 02/15/2018       Ins and Outs:  I/O last 24 hours:   In: 098 [P O :453; Blood:350]  Out: 1446 [AJS:8390]          Physical:  Vitals:    07/16/18 0308   BP: 107/81   Pulse: 83   Resp: 20   Temp: 97 8 °F (36 6 °C)   SpO2: 97%     left lower extremity  · Dressing C/D/I  · Sensation intact L1-S1  · Motor intact to knee flexion/extension, EHL/FHL  · 2+ dorsalis pedis pulse    _*_*_*_*_*_*_*_*_*_*_*_*_*_*_*_*_*_*_*_*_*_*_*_*_*_*_*_*_*_*_*_*_*_*_*_*_*_*_*_*_*    Assessment: 80 y  o female post operative day 3 left hip hemiarthroplasty       Plan:  · Up and out of bed  · Weightbearing as tolerated  · PT/OT  · DVT prophylaxis - lovenox  · Analgesics as needed  · Dispo: [id number] Tiffanie Walker for discharge from ortho perspective  · Patient noted to have acute blood loss anemia due to a drop in Hbg of > 2 0g from preop levels, will monitor vital signs and resuscitate with IV fluids as needed     Carlos Quan

## 2018-07-16 NOTE — PROGRESS NOTES
Progress Note - Margarito Tabor 11/23/1931, 80 y o  female MRN: [id number]    Unit/Bed#: CW3 337-01 Encounter: [id number]    Primary Care Provider: Praful Lantigua   Date and time admitted to hospital: 7/12/2018 12:44 AM        * Displaced intertrochanteric fracture of left femur, initial encounter for closed fracture [facility])   Assessment & Plan    POD #3 left hip hemiarthroplasty  Continue as per Orthopedic surgery  Acute blood loss anemia   Assessment & Plan     Patient received 1 unit of packed red cells on 07/15/2018  Hemoglobin stable at 9 3  Patient has baseline anemia of chronic disease secondary to CKD stage 4  Patient's baseline hemoglobin 8 4 - 9 5        Fall from bed   Assessment & Plan    Likely mechanical   Recent hip surgery now with fall and possible displacement of hardware  Pain controlled  Chronic diastolic congestive heart failure (HCC)   Assessment & Plan    Continue atenolol, Lasix  No ACEI or ARB 2/2 CKD  Appreciate cards input, may need further diuresis post op - discontinue post op fluids           HTN (hypertension)   Assessment & Plan    Patient will continue with atenolol, Lasix  Atrial fibrillation   Assessment & Plan    Permanent afib  Seen by cardiology last admission prior to OR  Rate controlled on tele, not on AC due to history of ICH in the past        Stage 4 chronic kidney disease ([facility] [address] )   Assessment & Plan    Baseline creatinine 2-2 5   Stable, monitor labs         Alzheimer disease   Assessment & Plan    Alert but oriented to self only  Regular sleep/wake cycles  High risk for delirium  Seroquel ordered for bedtime as needed         Oropharyngeal dysphagia   Assessment & Plan    Continue dysphagia pureed with HTL        Subdural hemorrhage   Assessment & Plan      History of subdural hemorrhage, in January 2018 -- currently resolved  SUBJECTIVE:   Patient seen and examined   She appears comfortable  Continues to be confused    Eating lunch  Patient received 1 unit of packed red cells yesterday  Appropriate response of hemoglobin  Case discussed with case management --  [facility]  OBJECTIVE:   Vitals:    07/16/18 0735   BP: 133/81   Pulse: 83   Resp: 18   Temp: 98 2 °F (36 8 °C)   SpO2: 96%       Intake/Output Summary (Last 24 hours) at 07/16/18 1236  Last data filed at 07/16/18 0905   Gross per 24 hour   Intake              628 ml   Output             1447 ml   Net             -819 ml     Physical Exam:   GENERAL:  Awake responsive follows commands, pleasantly confused  HEENT: atraumatic, normocephalic  Oral mucosa moist, no icterus, pallor  PERRLA +  Neck supple, no JVD, no lymphadenopathy, no thryomegaly  CHEST: B/L breath sounds heard, occasional wheezing  CVS: S1, S2   ABDOMEN: Soft/obese/NT/BS heard  NEUROLOGICAL: CN II -XI grossly intact  No focal motor or sensory deficits  No signs of meningeal irritation or cerebellar dysfunction  EXTREMITIES:  Left lower extremity dressing clean dry intact  Bilateral pitting pedal edema      LABS   7/16/2018 05:26   eGFR 30   Sodium 146 (H)   Potassium 3 8   Chloride 109 (H)   CO2 30   Anion Gap 7   BUN 33 (H)   Creatinine 1 57 (H)   Glucose 83   Calcium 8 3   WBC 9 81   RBC 2 97 (L)   Hemoglobin 9 4 (L)   Hematocrit 30 1 (L)    (H)   MCH 31 6   MCHC 31 2 (L)   RDW 16 3 (H)   Platelets 931   MPV 76 8     Scheduled Meds  Current Facility-Administered Medications:  acetaminophen 975 mg Oral Q8H [address] Matt Alanis MD   albuterol 2 5 mg Nebulization Q4H PRN Antionette Farnsworth MD   allopurinol 100 mg Oral Daily Sudarshan Thakkar MD   aluminum-magnesium hydroxide-simethicone 15 mL Oral Q6H PRN Deven Humphrey PA-C   aspirin 325 mg Oral Daily Sudarshan Thakkar MD   atenolol 50 mg Oral Daily Sudarshan Thakkar MD   budesonide-formoterol 2 puff Inhalation BID Sudarshan Thakkar MD   calcium carbonate 1,000 mg Oral Daily PRN Deven Humphrey PA-C docusate sodium 100 mg Oral BID Blaine Groves PA-C   enoxaparin 30 mg Subcutaneous Q24H [address] Sudarshan Henson MD   furosemide 60 mg Oral BID (diuretic) Marky Toth PA-C   ipratropium 0 5 mg Nebulization TID Antionette Farnsworth MD   levalbuterol 1 25 mg Nebulization TID Barbi Felix MD   nystatin 1 application Topical BID Sudarshan Thakkar MD   ondansetron 4 mg Intravenous Q6H PRN Sudarshan Thakkar MD   oxyCODONE 5 mg Oral Q4H PRN Sudarshan Thakkar MD   pantoprazole 40 mg Oral Early Morning Sudarshan Thakkar MD   polyethylene glycol 17 g Oral Daily Sudarshan Henson MD   potassium chloride 10 mEq Oral Every Other Day Sudarshan Henson MD   potassium chloride 20 mEq Oral Once Marky Toth PA-C   QUEtiapine 12 5 mg Oral HS PRN Sudarshan Thakkar MD   senna 8 6 mg Oral Daily Sudarshan Thakkar MD   silver sulfadiazine 1 application Topical Daily PRN Sudarshan Thakkar MD   traMADol 50 mg Oral Q6H PRN Blaine Groves PA-C     PRN Meds  albuterol    aluminum-magnesium hydroxide-simethicone    calcium carbonate    ondansetron    oxyCODONE    QUEtiapine    silver sulfadiazine    traMADol     VTE prophylaxis: Enoxaparin     Education and Discussions with Family / Patient:  Updated patient's daughter      Current Length of Stay: Day  4     Current Patient Status: Inpatient      Certification Statement: The patient will continue to require additional inpatient hospital stay due to ongoing goals of care discussion      Discharge Plan / Estimated Discharge Date:  Plan for today discuss with case management and RN      Code Status: Level 1 - Full Code     Time Spent for Care: 20 minutes   More than 50% of total time spent on counseling and coordination of care as described above      [address], MD  HOSPITALIST SERVICES  7/16/2018     PLEASE NOTE:  This encounter was completed utilizing the [employer]Fluency Direct Speech Voice Recognition Software   Grammatical errors, random word insertions, pronoun errors and incomplete sentences are occasional consequences of the system due to software limitations, ambient noise and hardware issues  These may be missed by proof reading prior to affixing electronic signature  Any questions or concerns about the content, text or information contained within the body of this dictation should be directly addressed to the physician for clarification   Please do not hesitate to call me directly if you have any any questions or concerns

## 2018-07-17 LAB
ANION GAP SERPL CALCULATED.3IONS-SCNC: 4 MMOL/L (ref 4–13)
BASOPHILS # BLD AUTO: 0.01 THOUSANDS/ΜL (ref 0–0.1)
BASOPHILS NFR BLD AUTO: 0 % (ref 0–1)
BUN SERPL-MCNC: 30 MG/DL (ref 5–25)
CALCIUM SERPL-MCNC: 8.7 MG/DL (ref 8.3–10.1)
CHLORIDE SERPL-SCNC: 108 MMOL/L (ref 100–108)
CO2 SERPL-SCNC: 34 MMOL/L (ref 21–32)
CREAT SERPL-MCNC: 1.57 MG/DL (ref 0.6–1.3)
EOSINOPHIL # BLD AUTO: 0.81 THOUSAND/ΜL (ref 0–0.61)
EOSINOPHIL NFR BLD AUTO: 9 % (ref 0–6)
ERYTHROCYTE [DISTWIDTH] IN BLOOD BY AUTOMATED COUNT: 15.9 % (ref 11.6–15.1)
GFR SERPL CREATININE-BSD FRML MDRD: 30 ML/MIN/1.73SQ M
GLUCOSE SERPL-MCNC: 78 MG/DL (ref 65–140)
HCT VFR BLD AUTO: 31.2 % (ref 34.8–46.1)
HGB BLD-MCNC: 9.7 G/DL (ref 11.5–15.4)
IMM GRANULOCYTES # BLD AUTO: 0.02 THOUSAND/UL (ref 0–0.2)
IMM GRANULOCYTES NFR BLD AUTO: 0 % (ref 0–2)
LYMPHOCYTES # BLD AUTO: 0.77 THOUSANDS/ΜL (ref 0.6–4.47)
LYMPHOCYTES NFR BLD AUTO: 9 % (ref 14–44)
MCH RBC QN AUTO: 31.2 PG (ref 26.8–34.3)
MCHC RBC AUTO-ENTMCNC: 31.1 G/DL (ref 31.4–37.4)
MCV RBC AUTO: 100 FL (ref 82–98)
MONOCYTES # BLD AUTO: 0.71 THOUSAND/ΜL (ref 0.17–1.22)
MONOCYTES NFR BLD AUTO: 8 % (ref 4–12)
NEUTROPHILS # BLD AUTO: 6.32 THOUSANDS/ΜL (ref 1.85–7.62)
NEUTS SEG NFR BLD AUTO: 74 % (ref 43–75)
NRBC BLD AUTO-RTO: 0 /100 WBCS
PLATELET # BLD AUTO: 336 THOUSANDS/UL (ref 149–390)
PMV BLD AUTO: 9.8 FL (ref 8.9–12.7)
POTASSIUM SERPL-SCNC: 3 MMOL/L (ref 3.5–5.3)
RBC # BLD AUTO: 3.11 MILLION/UL (ref 3.81–5.12)
SODIUM SERPL-SCNC: 146 MMOL/L (ref 136–145)
WBC # BLD AUTO: 8.64 THOUSAND/UL (ref 4.31–10.16)

## 2018-07-17 PROCEDURE — 85025 COMPLETE CBC W/AUTO DIFF WBC: CPT | Performed by: INTERNAL MEDICINE

## 2018-07-17 PROCEDURE — 80048 BASIC METABOLIC PNL TOTAL CA: CPT | Performed by: INTERNAL MEDICINE

## 2018-07-17 PROCEDURE — 94640 AIRWAY INHALATION TREATMENT: CPT

## 2018-07-17 PROCEDURE — G0515 COGNITIVE SKILLS DEVELOPMENT: HCPCS | Performed by: STUDENT IN AN ORGANIZED HEALTH CARE EDUCATION/TRAINING PROGRAM

## 2018-07-17 PROCEDURE — 99232 SBSQ HOSP IP/OBS MODERATE 35: CPT | Performed by: PHYSICIAN ASSISTANT

## 2018-07-17 PROCEDURE — 94760 N-INVAS EAR/PLS OXIMETRY 1: CPT

## 2018-07-17 RX ORDER — POTASSIUM CHLORIDE 20 MEQ/1
40 TABLET, EXTENDED RELEASE ORAL ONCE
Status: COMPLETED | OUTPATIENT
Start: 2018-07-17 | End: 2018-07-17

## 2018-07-17 RX ADMIN — ENOXAPARIN SODIUM 30 MG: 30 INJECTION, SOLUTION INTRAVENOUS; SUBCUTANEOUS at 08:23

## 2018-07-17 RX ADMIN — IPRATROPIUM BROMIDE 0.5 MG: 0.5 SOLUTION RESPIRATORY (INHALATION) at 07:37

## 2018-07-17 RX ADMIN — LEVALBUTEROL HYDROCHLORIDE 1.25 MG: 1.25 SOLUTION, CONCENTRATE RESPIRATORY (INHALATION) at 07:37

## 2018-07-17 RX ADMIN — SENNOSIDES 8.6 MG: 8.6 TABLET, FILM COATED ORAL at 08:23

## 2018-07-17 RX ADMIN — IPRATROPIUM BROMIDE 0.5 MG: 0.5 SOLUTION RESPIRATORY (INHALATION) at 13:53

## 2018-07-17 RX ADMIN — LEVALBUTEROL HYDROCHLORIDE 1.25 MG: 1.25 SOLUTION, CONCENTRATE RESPIRATORY (INHALATION) at 13:53

## 2018-07-17 RX ADMIN — ASPIRIN 325 MG: 325 TABLET ORAL at 08:23

## 2018-07-17 RX ADMIN — ALLOPURINOL 100 MG: 100 TABLET ORAL at 08:23

## 2018-07-17 RX ADMIN — BUDESONIDE AND FORMOTEROL FUMARATE DIHYDRATE 2 PUFF: 160; 4.5 AEROSOL RESPIRATORY (INHALATION) at 08:25

## 2018-07-17 RX ADMIN — DOCUSATE SODIUM 100 MG: 100 CAPSULE, LIQUID FILLED ORAL at 17:02

## 2018-07-17 RX ADMIN — ACETAMINOPHEN 975 MG: 325 TABLET ORAL at 05:20

## 2018-07-17 RX ADMIN — TRAMADOL HYDROCHLORIDE 50 MG: 50 TABLET, FILM COATED ORAL at 17:01

## 2018-07-17 RX ADMIN — IPRATROPIUM BROMIDE 0.5 MG: 0.5 SOLUTION RESPIRATORY (INHALATION) at 19:03

## 2018-07-17 RX ADMIN — DOCUSATE SODIUM 100 MG: 100 CAPSULE, LIQUID FILLED ORAL at 08:25

## 2018-07-17 RX ADMIN — ACETAMINOPHEN 975 MG: 325 TABLET ORAL at 13:05

## 2018-07-17 RX ADMIN — NYSTATIN 1 APPLICATION: 100000 POWDER TOPICAL at 17:03

## 2018-07-17 RX ADMIN — ATENOLOL 50 MG: 50 TABLET ORAL at 08:23

## 2018-07-17 RX ADMIN — LEVALBUTEROL HYDROCHLORIDE 1.25 MG: 1.25 SOLUTION, CONCENTRATE RESPIRATORY (INHALATION) at 19:03

## 2018-07-17 RX ADMIN — POTASSIUM CHLORIDE 40 MEQ: 1500 TABLET, EXTENDED RELEASE ORAL at 12:36

## 2018-07-17 RX ADMIN — BUDESONIDE AND FORMOTEROL FUMARATE DIHYDRATE 2 PUFF: 160; 4.5 AEROSOL RESPIRATORY (INHALATION) at 17:03

## 2018-07-17 RX ADMIN — PANTOPRAZOLE SODIUM 40 MG: 40 TABLET, DELAYED RELEASE ORAL at 05:20

## 2018-07-17 RX ADMIN — NYSTATIN 1 APPLICATION: 100000 POWDER TOPICAL at 08:25

## 2018-07-17 RX ADMIN — FUROSEMIDE 60 MG: 40 TABLET ORAL at 08:23

## 2018-07-17 NOTE — ASSESSMENT & PLAN NOTE
Continue atenolol, Lasix    No ACEI or ARB 2/2 CKD  Appears slightly dry- would hold tonight's dose of Lasix

## 2018-07-17 NOTE — DISCHARGE SUMMARY
Discharge- Julien [facility] 11/23/1931, 80 y o  female MRN: [id number]    Unit/Bed#: CW3 337-01 Encounter: [id number]    Primary Care Provider: Mayur Humphreys   Date and time admitted to hospital: 7/12/2018 12:44 AM        * Displaced intertrochanteric fracture of left femur, initial encounter for closed fracture [facility])   Assessment & Plan    POD #5 left hip hemiarthroplasty  F/U with ortho  DVT ppx        Hypernatremia   Assessment & Plan    · Sodium 147 today  · Patient was getting 60 mg Lasix BID here but it seems she was on 40 mg daily at home  Will resume home dose  Needs close BMP monitoring on discharge  Encourage po intake  · Potassium also low and was repleted  Again needs BMP monitoring        Fall from bed   Assessment & Plan    Likely mechanical   Recent hip surgery now with fall and displacement of hardware  Oropharyngeal dysphagia   Assessment & Plan    Continue dysphagia pureed with HTL        Stage 4 chronic kidney disease (HCC)   Assessment & Plan    Baseline creatinine 2-2 5   Stable, monitor labs         Atrial fibrillation   Assessment & Plan    Permanent afib  Seen by cardiology last admission prior to OR  Rate controlled on tele, not on AC due to history of ICH in the past        HTN (hypertension)   Assessment & Plan    Patient will continue with atenolol        Chronic diastolic congestive heart failure (HCC)   Assessment & Plan    Continue atenolol, Lasix    No ACEI or ARB 2/2 CKD          Alzheimer disease   Assessment & Plan    Alert but oriented to self only  Regular sleep/wake cycles  High risk for delirium  Seroquel ordered for bedtime as needed         Subdural hemorrhage   Assessment & Plan    History of subdural hemorrhage, in January 2018 -- currently resolved on imaging          Discharging Physician / Practitioner: Adolphus Boas, PA-C  PCP: Mayur Humphreys  Admission Date:   Admission Orders     Ordered        07/12/18 0306  Inpatient Admission  Once Discharge Date: 07/18/18    Disposition:      Short Term Rehab or SNF at [address] [place] [address] to [facility] SNF:   · Not Applicable to this Patient - Not Applicable to this Patient    Reason for Admission: Hip fracture    Discharge Diagnoses:     Please see assessment and plan section above for further details regarding discharge diagnoses  Resolved Problems  Date Reviewed: 7/17/2018    None          Consultations During Hospital Stay:  · Orthopedics    Procedures Performed:     · Left hip hemiarthroplasty, removal of deep implant left proximal femur 7/13/18    Medication Adjustments and Discharge Medications:  · Summary of Medication Adjustments made as a result of this hospitalization: Lovenox for DVT ppx x 28 days total  · Medication Dosing Tapers - Please refer to Discharge Medication List for details on any medication dosing tapers (if applicable to patient)  · Medications being temporarily held (include recommended restart time): None  · Discharge Medication List: See after visit summary for reconciled discharge medications  Wound Care Recommendations:  When applicable, please see wound care section of After Visit Summary  Diet Recommendations at Discharge:  Diet -        Diet Orders            Start     Ordered    07/14/18 0900  Dietary nutrition supplements  Once     Question Answer Comment   Select Supplement: HoneyShake    Frequency Breakfast, Lunch, Dinner        07/14/18 0859    07/13/18 1357  Diet Dysphagia/Modified Consistency; Dysphagia 1-Pureed; Honey Thick Liquid; Sodium 2 Gm  Diet effective now     Question Answer Comment   Diet Type Dysphagia/Modified Consistency    Dysphagia/Modified Consistency Dysphagia 1-Pureed    Liquid Modifier Honey Thick Liquid    Other Restriction(s): Sodium 2 Gm    RD to adjust diet per protocol?  Yes        07/13/18 1356          Instructions for any Catheters / Lines Present at Discharge (including removal date, if applicable): None    Significant Findings / Test Results:     XR hip/pelv 1 vw left if performed   Final Result by Silvia Wilson MD (07/13 0486)      Fluoroscopic guidance provided for surgical procedure  Please refer to the separate procedure notes for additional details  Workstation performed: [id number]         XR chest pa & lateral   Final Result by Mar Mart MD (07/12 9710)      Mild pulmonary vascular congestion  Workstation performed: [id number]         XR knee 1 or 2 vw left   Final Result by Rosa Elena Pascal MD (07/12 3362)      Left knee osteoarthritis with rather severe medial joint compartment narrowing            Workstation performed: [id number]           · Head CT (7/11): No acute abnormality  · CT c-spine (7/11/18): No acute fracture  Degenerative changes  · X-ray B/L hips (7/11/18): Left hip hardware failure with migration of femoral neck nail through superolateral femoral head cortex into joint space  There is perhaps slightly more pronounced coxa varus deformity of left hip compared to postoperative film, however no discernible new fracture  · X-ray right forearm (7/11/18): Question irregularity/lucency of distal radial styloid  Correlate with any point tenderness  · Sodium (7/18/18): 147  · Potassium (7/18/18): 3 2 (repleted)    Incidental Findings:   · None    Test Results Pending at Discharge (will require follow up): · None     Outpatient Tests Requested:  · BMP    Complications:  None    Hospital Course:     Iker Marquez is a 80 y o  female patient with a history of dementia, CHF, HTN, CKD Stage 4, SDH, and a fib who originally presented to [facility] on 7/11/18 due to fall  She had a similar fall in May 2018 and was approximately 6 week s/p intertrochanteric left femur fracture repaired by cephalomedullary nail  On admission, imaging was concerning for left hip hardware failure with cut out   Orthopedics was not available at  [facility] so she was transferred to [facility] [place] [facility] [place]  She underwent left hip hemiarthroplasty on 5/39/63 without complication  She will be discharged to rehab  Of note, she was to have hypernatremia- it seems she was getting a higher Lasix dose while hospitalized than she was at home  Her home dose of Lasix will be resumed  She will need close monitoring of BMP and volume status  Condition at Discharge: good     Discharge Day Visit / Exam:     Subjective:  Feels well  No complaints  Vitals: Blood Pressure: 120/79 (07/18/18 0729)  Pulse: 90 (07/18/18 0729)  Temperature: 97 9 °F (36 6 °C) (07/18/18 0729)  Temp Source: Oral (07/18/18 0729)  Respirations: 18 (07/18/18 0729)  Height: 5' (152 4 cm) (07/13/18 2133)  Weight - Scale: 75 8 kg (167 lb 3 2 oz) (07/18/18 0600)  SpO2: 95 % (07/18/18 0815)  Exam:   Physical Exam   Constitutional: No distress  HENT:   Head: Normocephalic and atraumatic  Eyes: No scleral icterus  Neck: Normal range of motion  Neck supple  Cardiovascular: Normal rate, regular rhythm and normal heart sounds  Pulmonary/Chest: Effort normal and breath sounds normal  No respiratory distress  She has no wheezes  She has no rales  Musculoskeletal: She exhibits edema  Neurological: She is alert  Oriented to self only   Skin: Skin is warm and dry  She is not diaphoretic  Psychiatric: She has a normal mood and affect  Her behavior is normal  Thought content normal          Goals of Care Discussions:  · Code Status at Discharge: Level 1 - Full Code  · Were there any Goals of Care Discussions during Hospitalization?: No  · Results of any General Goals of Care Discussions: N/A   · POLST Completed: No   · If POLST Completed, Summary of POLST Agreement Provided Here: None   · OK to Rehospitalize if Needed? Yes    Discharge instructions/Information to patient and family:   See after visit summary section titled Discharge Instructions for information provided to patient and family        Planned Readmission: None      Discharge Statement:  I spent 45 minutes discharging the patient  This time was spent on the day of discharge  I had direct contact with the patient on the day of discharge  Greater than 50% of the total time was spent examining patient, answering all patient questions, arranging and discussing plan of care with patient as well as directly providing post-discharge instructions  Additional time then spent on discharge activities      ** Please Note: This note has been constructed using a voice recognition system **

## 2018-07-17 NOTE — SOCIAL WORK
CM met with pts daughter Adelia Alberts [phone], explained [facility] could not accept, cm requested more choices  Per family request, cm added more choices: [facility] and [facility] [address]  CM placed call to [facility] to see if PT/OT could be provided at facility, left message for callback

## 2018-07-17 NOTE — PROGRESS NOTES
Orthopedics   Huong Menjivar 80 y o  female MRN: [id number]  Unit/Bed#: CW3 337-01      Subjective:  80 y  o female post operative day 4 left hip removal of hardware and conversion to a hemiarthroplasty  Patient slept well overnight  Worked with PT yesterday  Labs:    0  Lab Value Date/Time   HCT 31 2 (L) 07/17/2018 0505   HCT 30 1 (L) 07/16/2018 0526   HCT 30 5 (L) 07/15/2018 1927   HCT 24 3 (L) 06/02/2018 0515   HGB 9 7 (L) 07/17/2018 0505   HGB 9 4 (L) 07/16/2018 0526   HGB 9 5 (L) 07/15/2018 1927   HGB 8 1 (L) 06/02/2018 0515   INR 1 15 07/13/2018 0536   WBC 8 64 07/17/2018 0505   WBC 9 81 07/16/2018 0526   WBC 9 81 07/15/2018 0637   WBC 11 2 (H) 06/02/2018 0515   ESR 92 (H) 05/29/2018 1353       Meds:    Current Facility-Administered Medications:     acetaminophen (TYLENOL) tablet 975 mg, 975 mg, Oral, Q8H [facility], Matt Alanis MD, 975 mg at 07/17/18 0520    albuterol inhalation solution 2 5 mg, 2 5 mg, Nebulization, Q4H PRN, Antionette Farnsworth MD    allopurinol (ZYLOPRIM) tablet 100 mg, 100 mg, Oral, Daily, Sudarshan Lin MD, 100 mg at 07/16/18 0918    aluminum-magnesium hydroxide-simethicone (MYLANTA) 200-200-20 mg/5 mL oral suspension 15 mL, 15 mL, Oral, Q6H PRN, Ariel Orellana PA-C    aspirin tablet 325 mg, 325 mg, Oral, Daily, Sudarshan Lin MD, 325 mg at 07/16/18 0918    atenolol (TENORMIN) tablet 50 mg, 50 mg, Oral, Daily, Sudarshan Lin MD, 50 mg at 07/16/18 0918    budesonide-formoterol (SYMBICORT) 160-4 5 mcg/act inhaler 2 puff, 2 puff, Inhalation, BID, Sudarshan Thakkar MD, 2 puff at 07/16/18 1759    calcium carbonate (TUMS) chewable tablet 1,000 mg, 1,000 mg, Oral, Daily PRN, Chamois Spray, PA-C    docusate sodium (COLACE) capsule 100 mg, 100 mg, Oral, BID, Ariel Spray, PA-C, 100 mg at 07/16/18 1755    enoxaparin (LOVENOX) subcutaneous injection 30 mg, 30 mg, Subcutaneous, Q24H [facility], Sudarshan Lin MD, 30 mg at 07/16/18 0919    furosemide (LASIX) tablet 60 mg, 60 mg, Oral, BID (diuretic), Marky Toth PA-C, 60 mg at 07/16/18 1755    ipratropium (ATROVENT) 0 02 % inhalation solution 0 5 mg, 0 5 mg, Nebulization, TID, Antionette Farnsworth MD, 0 5 mg at 07/16/18 2009    levalbuterol (Patricia Wade) inhalation solution 1 25 mg, 1 25 mg, Nebulization, TID, Antionette Farnsworth MD, 1 25 mg at 07/16/18 2009    nystatin (MYCOSTATIN) powder 1 application, 1 application, Topical, BID, Sudarshan Gallegos MD, 1 application at 14/08/88 1753    ondansetron (ZOFRAN) injection 4 mg, 4 mg, Intravenous, Q6H PRN, Sudarshan Thakkar MD    oxyCODONE (ROXICODONE) IR tablet 5 mg, 5 mg, Oral, Q4H PRN, Sudarshan Thakkar MD    pantoprazole (PROTONIX) EC tablet 40 mg, 40 mg, Oral, Early Morning, Sudarshan Gallegos MD, 40 mg at 07/17/18 0520    polyethylene glycol (MIRALAX) packet 17 g, 17 g, Oral, Daily, Sudarshan Gallegos MD, 17 g at 07/15/18 0919    potassium chloride (K-DUR,KLOR-CON) CR tablet 10 mEq, 10 mEq, Oral, Every Other Day, Sudarshan Gallegos MD, 10 mEq at 07/16/18 0918    potassium chloride (K-DUR,KLOR-CON) CR tablet 20 mEq, 20 mEq, Oral, Once, Marky Toth PA-C    QUEtiapine (SEROquel) tablet 12 5 mg, 12 5 mg, Oral, HS PRN, Sudarshan Gallegos MD, 12 5 mg at 07/16/18 2158    senna (SENOKOT) tablet 8 6 mg, 8 6 mg, Oral, Daily, Sudarshan Gallegos MD, 8 6 mg at 07/16/18 0919    silver sulfadiazine (SILVADENE,SSD) 1 % cream 1 application, 1 application, Topical, Daily PRN, Sudarshan Gallegos MD, 1 application at 83/21/43 0850    traMADol (ULTRAM) tablet 50 mg, 50 mg, Oral, Q6H PRN, Shane Davis PA-C, 50 mg at 07/15/18 0919    Blood Culture:   No results found for: BLOODCX    Wound Culture:   Lab Results   Component Value Date    WOUNDCULT 2+ Growth of Staphylococcus aureus (A) 02/15/2018       Ins and Outs:  I/O last 24 hours:   In: 380 [P O :380]  Out: 1754 [[id number]]          Physical:  Vitals:    07/16/18 2315   BP: 118/78   Pulse: 101   Resp: 18   Temp: 97 8 °F (36 6 °C)   SpO2: 94%     left lower extremity  · Incision C/D/I  · Sensation intact L1-S1  · Motor intact to knee flexion/extension, EHL/FHL  · 2+ dorsalis pedis pulse    _*_*_*_*_*_*_*_*_*_*_*_*_*_*_*_*_*_*_*_*_*_*_*_*_*_*_*_*_*_*_*_*_*_*_*_*_*_*_*_*_*    Assessment: 80 y  o female post operative day 4 left hip hemiarthroplasty       Plan:  · Up and out of bed  · Weightbearing as tolerated  · PT/OT as able  · DVT prophylaxis - lovenox  · Analgesics as needed  · Dispo: [id number] [address] Walker for discharge from ortho perspective  · Patient noted to have acute blood loss anemia due to a drop in Hbg of > 2 0g from preop levels, will monitor vital signs and resuscitate with IV fluids as needed     Mirella Rom

## 2018-07-17 NOTE — PLAN OF CARE
Problem: OCCUPATIONAL THERAPY ADULT  Goal: Performs self-care activities at highest level of function for planned discharge setting  See evaluation for individualized goals  Treatment Interventions: ADL retraining, Functional transfer training, Endurance training, Cognitive reorientation, Patient/family training, Equipment evaluation/education, Compensatory technique education, Activityengagement          See flowsheet documentation for full assessment, interventions and recommendations  Outcome: Progressing  Limitation: Decreased ADL status, Decreased Safe judgement during ADL, Decreased cognition, Decreased endurance, Decreased self-care trans, Decreased high-level ADLs  Prognosis: Good  Assessment: Pt participates in OT session with focus on cognitive reorientation to increase I for d/c  Pt scored 2/30 on MOCA  Please see below for details  Pt attempts to get out of bed during session stating, "I have to get out to do the laundry and take a shower" Pt able to be redirected to complete MOCA  Pt does require encouragement to continue to finish MOCA  Pt will continue to benefit from activity tolerance, cognitive reorientation, and adls       OT Discharge Recommendation: Short Term Rehab

## 2018-07-17 NOTE — OCCUPATIONAL THERAPY NOTE
18 1523   Restrictions/Precautions   Weight Bearing Precautions Per Order Yes   RUE Weight Bearing Per Order WBAT   LUE Weight Bearing Per Order WBAT   RLE Weight Bearing Per Order WBAT   LLE Weight Bearing Per Order WBAT   Other Precautions Cognitive; Chair Alarm; Bed Alarm;WBS;THR;Fall Risk;Pain;Hard of hearing   Pain Assessment   Pain Assessment No/denies pain   Pain Score No Pain   ADL   Where Assessed Supine, bed  (HOB elevated)   Cognition   Overall Cognitive Status Impaired   Arousal/Participation Responsive   Attention Difficulty attending to directions   Orientation Level Oriented to person   Memory Decreased long term memory;Decreased recall of biographical information;Decreased short term memory;Decreased recall of recent events;Decreased recall of precautions   Following Commands Follows one step commands inconsistently   Cognition Assessment Tools MOCA   Score 2   Activity Tolerance   Activity Tolerance Patient tolerated treatment well   Assessment   Assessment Pt participates in OT session with focus on cognitive reorientation to increase I for d/c  Pt scored 2/30 on MOCA  Please see below for details  Pt attempts to get out of bed during session stating, "I have to get out to do the laundry and take a shower" Pt able to be redirected to complete MOCA  Pt does require encouragement to continue to finish MOCA  Pt will continue to benefit from activity tolerance, cognitive reorientation, and adls  Plan   Treatment Interventions Cognitive reorientation   Goal Expiration Date 18   Treatment Day 2   OT Frequency 2-3x/wk   Recommendation   OT Discharge Recommendation Short Term Rehab     PT SEEN FOR LIBRA COGNITIVE ASSESSMENT  SCORED  2/30 INDICATING SEVERE COGNITIVE IMPAIRMENT FOR AGE/EDUCATION  SCORES ARE AS FOLLOWS:    VISUOSPATIAL/EXECUTIVE FUNCTION: 0/5    NAMIN/3    ATTENTION: 1/6  Pt scored 1 point for repeating numbers in forward order      LANGUAGE: 0/3    ABSTRACTION: 0/2    DELAYED RECALL: 0/5    ORIENTATION: 0/6    +1 less than 12 years formal education

## 2018-07-17 NOTE — PROGRESS NOTES
Progress Note - Kathie Dupree 11/23/1931, 80 y o  female MRN: [id number]    Unit/Bed#: CW3 337-01 Encounter: [id number]    Primary Care Provider: Radha Puri   Date and time admitted to hospital: 7/12/2018 12:44 AM        * Displaced intertrochanteric fracture of left femur, initial encounter for closed fracture [facility])   Assessment & Plan    POD #4 left hip hemiarthroplasty  F/U with ortho  DVT ppx        Fall from bed   Assessment & Plan    Likely mechanical   Recent hip surgery now with fall and displacement of hardware  Oropharyngeal dysphagia   Assessment & Plan    Continue dysphagia pureed with HTL        Stage 4 chronic kidney disease (HCC)   Assessment & Plan    Baseline creatinine 2-2 5   Stable, monitor labs         Atrial fibrillation   Assessment & Plan    Permanent afib  Seen by cardiology last admission prior to OR  Rate controlled on tele, not on AC due to history of ICH in the past        HTN (hypertension)   Assessment & Plan    Patient will continue with atenolol        Chronic diastolic congestive heart failure (HCC)   Assessment & Plan    Continue atenolol, Lasix  No ACEI or ARB 2/2 CKD  Appears slightly dry- would hold tonight's dose of Lasix        Alzheimer disease   Assessment & Plan    Alert but oriented to self only  Regular sleep/wake cycles  High risk for delirium  Seroquel ordered for bedtime as needed         Subdural hemorrhage   Assessment & Plan    History of subdural hemorrhage, in January 2018 -- currently resolved on imaging          VTE Pharmacologic Prophylaxis:   Pharmacologic: Enoxaparin (Lovenox)  Mechanical VTE Prophylaxis in Place: Yes    Patient Centered Rounds: I have performed bedside rounds with nursing staff today  Discussions with Specialists or Other Care Team Provider: None    Education and Discussions with Family / Patient: Updated daughterClive Point  Time Spent for Care: 20 minutes    More than 50% of total time spent on counseling and coordination of care as described above  Current Length of Stay: 5 day(s)    Current Patient Status: Inpatient   Certification Statement: The patient will continue to require additional inpatient hospital stay due to rehab placement    Discharge Plan: Medically stable for discharge to rehab  Code Status: Level 1 - Full Code      Subjective:   Feels well  No complaints  Objective:     Vitals:   Temp (24hrs), Av 3 °F (36 8 °C), Min:97 8 °F (36 6 °C), Max:99 2 °F (37 3 °C)    HR:  [] 83  Resp:  [18] 18  BP: (118-128)/(69-82) 118/69  SpO2:  [94 %-98 %] 96 %  Body mass index is 32 81 kg/m²  Input and Output Summary (last 24 hours): Intake/Output Summary (Last 24 hours) at 18  Last data filed at 18   Gross per 24 hour   Intake              500 ml   Output             1354 ml   Net             -854 ml       Physical Exam:     Physical Exam   Constitutional: No distress  HENT:   Head: Normocephalic and atraumatic  MM dry   Eyes: No scleral icterus  Neck: Normal range of motion  Neck supple  Cardiovascular: Normal rate, regular rhythm and normal heart sounds  Pulmonary/Chest: Effort normal and breath sounds normal  No respiratory distress  She has no wheezes  She has no rales  Abdominal: Soft  Bowel sounds are normal  She exhibits no distension  There is no tenderness  There is no rebound  Musculoskeletal: She exhibits no edema  Dressing over left hip   Neurological:   Alert  Oriented to self only   Skin: Skin is warm and dry  She is not diaphoretic  Psychiatric: She has a normal mood and affect   Her behavior is normal  Thought content normal        Additional Data:     Labs:      Results from last 7 days  Lab Units 18  0505   WBC Thousand/uL 8 64   HEMOGLOBIN g/dL 9 7*   HEMATOCRIT % 31 2*   PLATELETS Thousands/uL 336   NEUTROS PCT % 74   LYMPHS PCT % 9*   MONOS PCT % 8   EOS PCT % 9*       Results from last 7 days  Lab Units 18  0505 07/11/18  1713   SODIUM mmol/L 146*  < > 143   POTASSIUM mmol/L 3 0*  < > 4 2   CHLORIDE mmol/L 108  < > 103   CO2 mmol/L 34*  < > 32   BUN mg/dL 30*  < > 29*   CREATININE mg/dL 1 57*  < > 1 53*   CALCIUM mg/dL 8 7  < > 8 8   TOTAL PROTEIN g/dL  --   --  7 1   BILIRUBIN TOTAL mg/dL  --   --  0 50   ALK PHOS U/L  --   --  157*   ALT U/L  --   --  18   AST U/L  --   --  34   GLUCOSE RANDOM mg/dL 78  < > 107   GLUCOSE, ISTAT   --   < >  --    < > = values in this interval not displayed  Results from last 7 days  Lab Units 07/13/18  0536   INR  1 15       * I Have Reviewed All Lab Data Listed Above  * Additional Pertinent Lab Tests Reviewed: All Labs Within Last 24 Hours Reviewed    Imaging:    Imaging Reports Reviewed Today Include: None  Imaging Personally Reviewed by Myself Includes:  None    Recent Cultures (last 7 days):           Last 24 Hours Medication List:     Current Facility-Administered Medications:  acetaminophen 975 mg Oral Q8H [address] Matt Alanis MD   albuterol 2 5 mg Nebulization Q4H PRN Jolie Rich MD   allopurinol 100 mg Oral Daily Sudarshan Thakkar MD   aluminum-magnesium hydroxide-simethicone 15 mL Oral Q6H PRN [employer], PA-C   aspirin 325 mg Oral Daily Sudarshan Thakkar MD   atenolol 50 mg Oral Daily Sudarshan Thakkar MD   budesonide-formoterol 2 puff Inhalation BID Sudarshan Thakkar MD   calcium carbonate 1,000 mg Oral Daily PRN [employer], PA-C   docusate sodium 100 mg Oral BID [employer], PA-C   enoxaparin 30 mg Subcutaneous Q24H [address] Sudarshan Blankenship MD   [START ON 7/18/2018] furosemide 60 mg Oral BID (diuretic) Bruno Ferreira PA-C   ipratropium 0 5 mg Nebulization TID Antionette Farnsworth MD   levalbuterol 1 25 mg Nebulization TID Jolie Rich MD   nystatin 1 application Topical BID Sudarshan Thakkar MD   ondansetron 4 mg Intravenous Q6H PRN Bilal A  MD Bijan   oxyCODONE 5 mg Oral Q4H PRN Bilal A   MD Bijan   pantoprazole 40 mg Oral Early Morning Bilal Keo Beverly MD   polyethylene glycol 17 g Oral Daily Bilal VIVIANA Oliveira MD   potassium chloride 10 mEq Oral Every Other Day Bilal VIVIANA Oliveira MD   potassium chloride 20 mEq Oral Once Marky Ttoh PA-C   QUEtiapine 12 5 mg Oral HS PRN Sudarshan Thakkar MD   senna 8 6 mg Oral Daily Biljosseline Thakkar MD   silver sulfadiazine 1 application Topical Daily PRN Biljosseline Thakkar MD   traMADol 50 mg Oral Q6H PRN Martha Raman PA-C        Today, Patient Was Seen By: James Ardon PA-C    ** Please Note: Dragon 360 Dictation voice to text software may have been used in the creation of this document   **

## 2018-07-18 VITALS
SYSTOLIC BLOOD PRESSURE: 120 MMHG | WEIGHT: 167.2 LBS | HEART RATE: 90 BPM | TEMPERATURE: 97.9 F | OXYGEN SATURATION: 95 % | BODY MASS INDEX: 32.83 KG/M2 | RESPIRATION RATE: 18 BRPM | DIASTOLIC BLOOD PRESSURE: 79 MMHG | HEIGHT: 60 IN

## 2018-07-18 PROBLEM — E87.0 HYPERNATREMIA: Status: ACTIVE | Noted: 2018-07-18

## 2018-07-18 LAB
ANION GAP SERPL CALCULATED.3IONS-SCNC: 4 MMOL/L (ref 4–13)
BASOPHILS # BLD AUTO: 0.02 THOUSANDS/ΜL (ref 0–0.1)
BASOPHILS NFR BLD AUTO: 0 % (ref 0–1)
BUN SERPL-MCNC: 32 MG/DL (ref 5–25)
CALCIUM SERPL-MCNC: 8.4 MG/DL (ref 8.3–10.1)
CHLORIDE SERPL-SCNC: 110 MMOL/L (ref 100–108)
CO2 SERPL-SCNC: 33 MMOL/L (ref 21–32)
CREAT SERPL-MCNC: 1.59 MG/DL (ref 0.6–1.3)
EOSINOPHIL # BLD AUTO: 1.06 THOUSAND/ΜL (ref 0–0.61)
EOSINOPHIL NFR BLD AUTO: 12 % (ref 0–6)
ERYTHROCYTE [DISTWIDTH] IN BLOOD BY AUTOMATED COUNT: 15.4 % (ref 11.6–15.1)
GFR SERPL CREATININE-BSD FRML MDRD: 29 ML/MIN/1.73SQ M
GLUCOSE SERPL-MCNC: 92 MG/DL (ref 65–140)
HCT VFR BLD AUTO: 32.2 % (ref 34.8–46.1)
HGB BLD-MCNC: 9.6 G/DL (ref 11.5–15.4)
IMM GRANULOCYTES # BLD AUTO: 0.03 THOUSAND/UL (ref 0–0.2)
IMM GRANULOCYTES NFR BLD AUTO: 0 % (ref 0–2)
LYMPHOCYTES # BLD AUTO: 0.97 THOUSANDS/ΜL (ref 0.6–4.47)
LYMPHOCYTES NFR BLD AUTO: 11 % (ref 14–44)
MCH RBC QN AUTO: 30.2 PG (ref 26.8–34.3)
MCHC RBC AUTO-ENTMCNC: 29.8 G/DL (ref 31.4–37.4)
MCV RBC AUTO: 101 FL (ref 82–98)
MONOCYTES # BLD AUTO: 0.87 THOUSAND/ΜL (ref 0.17–1.22)
MONOCYTES NFR BLD AUTO: 10 % (ref 4–12)
NEUTROPHILS # BLD AUTO: 5.72 THOUSANDS/ΜL (ref 1.85–7.62)
NEUTS SEG NFR BLD AUTO: 67 % (ref 43–75)
NRBC BLD AUTO-RTO: 0 /100 WBCS
PLATELET # BLD AUTO: 367 THOUSANDS/UL (ref 149–390)
PMV BLD AUTO: 10.1 FL (ref 8.9–12.7)
POTASSIUM SERPL-SCNC: 3.2 MMOL/L (ref 3.5–5.3)
RBC # BLD AUTO: 3.18 MILLION/UL (ref 3.81–5.12)
SODIUM SERPL-SCNC: 147 MMOL/L (ref 136–145)
WBC # BLD AUTO: 8.67 THOUSAND/UL (ref 4.31–10.16)

## 2018-07-18 PROCEDURE — 97110 THERAPEUTIC EXERCISES: CPT

## 2018-07-18 PROCEDURE — 94640 AIRWAY INHALATION TREATMENT: CPT

## 2018-07-18 PROCEDURE — 94760 N-INVAS EAR/PLS OXIMETRY 1: CPT

## 2018-07-18 PROCEDURE — 80048 BASIC METABOLIC PNL TOTAL CA: CPT | Performed by: PHYSICIAN ASSISTANT

## 2018-07-18 PROCEDURE — 99239 HOSP IP/OBS DSCHRG MGMT >30: CPT | Performed by: PHYSICIAN ASSISTANT

## 2018-07-18 PROCEDURE — 85025 COMPLETE CBC W/AUTO DIFF WBC: CPT | Performed by: PHYSICIAN ASSISTANT

## 2018-07-18 RX ORDER — ACETAMINOPHEN 325 MG/1
975 TABLET ORAL 3 TIMES DAILY
Qty: 30 TABLET | Refills: 0 | Status: SHIPPED | OUTPATIENT
Start: 2018-07-18

## 2018-07-18 RX ORDER — OXYCODONE HYDROCHLORIDE 5 MG/1
5 TABLET ORAL EVERY 6 HOURS PRN
Qty: 30 TABLET | Refills: 0 | Status: SHIPPED | OUTPATIENT
Start: 2018-07-18

## 2018-07-18 RX ORDER — FUROSEMIDE 40 MG/1
40 TABLET ORAL DAILY
Status: DISCONTINUED | OUTPATIENT
Start: 2018-07-19 | End: 2018-07-18 | Stop reason: HOSPADM

## 2018-07-18 RX ORDER — POTASSIUM CHLORIDE 20 MEQ/1
40 TABLET, EXTENDED RELEASE ORAL ONCE
Status: COMPLETED | OUTPATIENT
Start: 2018-07-18 | End: 2018-07-18

## 2018-07-18 RX ORDER — LACTULOSE 20 G/30ML
20 SOLUTION ORAL ONCE
Status: COMPLETED | OUTPATIENT
Start: 2018-07-18 | End: 2018-07-18

## 2018-07-18 RX ADMIN — ACETAMINOPHEN 975 MG: 325 TABLET ORAL at 06:20

## 2018-07-18 RX ADMIN — IPRATROPIUM BROMIDE 0.5 MG: 0.5 SOLUTION RESPIRATORY (INHALATION) at 08:00

## 2018-07-18 RX ADMIN — ENOXAPARIN SODIUM 30 MG: 30 INJECTION, SOLUTION INTRAVENOUS; SUBCUTANEOUS at 08:44

## 2018-07-18 RX ADMIN — BUDESONIDE AND FORMOTEROL FUMARATE DIHYDRATE 2 PUFF: 160; 4.5 AEROSOL RESPIRATORY (INHALATION) at 08:44

## 2018-07-18 RX ADMIN — LEVALBUTEROL HYDROCHLORIDE 1.25 MG: 1.25 SOLUTION, CONCENTRATE RESPIRATORY (INHALATION) at 13:42

## 2018-07-18 RX ADMIN — POLYETHYLENE GLYCOL 3350 17 G: 17 POWDER, FOR SOLUTION ORAL at 08:37

## 2018-07-18 RX ADMIN — NYSTATIN 1 APPLICATION: 100000 POWDER TOPICAL at 08:44

## 2018-07-18 RX ADMIN — POTASSIUM CHLORIDE 10 MEQ: 750 TABLET, EXTENDED RELEASE ORAL at 08:36

## 2018-07-18 RX ADMIN — POTASSIUM CHLORIDE 40 MEQ: 1500 TABLET, EXTENDED RELEASE ORAL at 13:12

## 2018-07-18 RX ADMIN — ATENOLOL 50 MG: 50 TABLET ORAL at 08:36

## 2018-07-18 RX ADMIN — FUROSEMIDE 60 MG: 40 TABLET ORAL at 08:36

## 2018-07-18 RX ADMIN — LEVALBUTEROL HYDROCHLORIDE 1.25 MG: 1.25 SOLUTION, CONCENTRATE RESPIRATORY (INHALATION) at 08:00

## 2018-07-18 RX ADMIN — DOCUSATE SODIUM 100 MG: 100 CAPSULE, LIQUID FILLED ORAL at 08:36

## 2018-07-18 RX ADMIN — ALLOPURINOL 100 MG: 100 TABLET ORAL at 08:36

## 2018-07-18 RX ADMIN — ASPIRIN 325 MG: 325 TABLET ORAL at 08:36

## 2018-07-18 RX ADMIN — ACETAMINOPHEN 975 MG: 325 TABLET ORAL at 13:12

## 2018-07-18 RX ADMIN — PANTOPRAZOLE SODIUM 40 MG: 40 TABLET, DELAYED RELEASE ORAL at 06:20

## 2018-07-18 RX ADMIN — LACTULOSE 20 G: 20 SOLUTION ORAL at 15:17

## 2018-07-18 RX ADMIN — IPRATROPIUM BROMIDE 0.5 MG: 0.5 SOLUTION RESPIRATORY (INHALATION) at 13:42

## 2018-07-18 RX ADMIN — SENNOSIDES 8.6 MG: 8.6 TABLET, FILM COATED ORAL at 08:36

## 2018-07-18 NOTE — ASSESSMENT & PLAN NOTE
· Sodium 147 today  · Patient was getting 60 mg Lasix BID here but it seems she was on 40 mg daily at home  Will resume home dose  Needs close BMP monitoring on discharge  Encourage po intake  · Potassium also low and was repleted   Again needs BMP monitoring

## 2018-07-18 NOTE — PLAN OF CARE
Problem: PHYSICAL THERAPY ADULT  Goal: Performs mobility at highest level of function for planned discharge setting  See evaluation for individualized goals  Treatment/Interventions: Functional transfer training, ADL retraining, LE strengthening/ROM, Therapeutic exercise, Endurance training, Equipment eval/education, Bed mobility, Gait training, Spoke to nursing  Equipment Recommended: Marco Antonio Spangler       See flowsheet documentation for full assessment, interventions and recommendations  Outcome: Progressing  Prognosis: Good  Problem List: Decreased mobility, Decreased cognition, Impaired judgement, Decreased safety awareness, Orthopedic restrictions, Decreased range of motion  Assessment: pt  supine in bed upon entry  Reported "I am doing well I think"  Agreeable to PT initially and performed LE TE's in supine AROM of RLE and AAROM for LLE for HS  pt  started to get agitated as time progressed and declined to furtehr participate in session asking the therapist to get out of the room and swatting at the therapist  Session cut short due to patient agiatation and unwillingness to participate in therapy    Will continue to follow durign joycelyn stay to improve functional mobility  All needs within reach and bed alarm on at the end of session  Recommendation: Other (Comment) (rehab)     PT - OK to Discharge: Yes (to rehab)    See flowsheet documentation for full assessment

## 2018-07-18 NOTE — PHYSICAL THERAPY NOTE
Physical Therapy Progress Note       07/18/18 1121   Pain Assessment   Pain Assessment No/denies pain   Restrictions/Precautions   Weight Bearing Precautions Per Order Yes   LLE Weight Bearing Per Order WBAT   Other Precautions THR; Bed Alarm;Cognitive;Combative;Agitated; Fall Risk;Hard of hearing   General   Chart Reviewed Yes   Family/Caregiver Present No   Cognition   Overall Cognitive Status Impaired   Subjective   Subjective Agreeable to PT initially  Reported " I am doing fine I think " Pt  not oriiented to situation, and place  Activity Tolerance   Activity Tolerance Treatment limited secondary to agitation   Nurse Made Aware Yes   Exercises   Quad Sets Supine;10 reps;Bilateral;AROM   Heelslides Supine;Bilateral;AAROM;AROM;20 reps   Ankle Pumps Bilateral;AAROM;20 reps; Supine   Assessment   Prognosis Good   Problem List Decreased mobility; Decreased cognition; Impaired judgement;Decreased safety awareness;Orthopedic restrictions;Decreased range of motion   Assessment pt  supine in bed upon entry  Reported "I am doing well I think"  Agreeable to PT initially and performed LE TE's in supine AROM of RLE and AAROM for LLE for HS  pt  started to get agitated as time progressed and declined to furtehr participate in session asking the therapist to get out of the room and swatting at the therapist  Session cut short due to patient agiatation and unwillingness to participate in therapy    Will continue to follow [facility] stay to improve functional mobility  All needs within reach and bed alarm on at the end of session     Goals   Patient Goals "leave me alone"   STG Expiration Date 07/23/18   Plan   Treatment/Interventions Therapeutic exercise;Cognitive reorientation;Spoke to nursing;OT   Progress Slow progress, cognitive deficits   PT Frequency Other (Comment)  (5-7x/week)   Recommendation   Recommendation Other (Comment)  (rehab)     Eddi Bhatt PTA

## 2018-07-18 NOTE — MEDICAL STUDENT
[facility] Internal Medicine Progress Note  Patient: Quyen De La Cruz 80 y o  female   MRN: [id number]  PCP: Kyrie Longo  Unit/Bed#: OK0 223-81 Encounter: [id number]  Date Of Visit: 07/18/18    Assessment:80year old female with displaced intertrochanter fracture of L femur s/p conversion L hip hemiarthroplasty and removal of deep implant L proximal femur  Principal Problem:    Displaced intertrochanteric fracture of left femur, initial encounter for closed fracture [facility])  Active Problems:    Fall from bed    Chronic diastolic congestive heart failure (HCC)    HTN (hypertension)    Atrial fibrillation    Stage 4 chronic kidney disease (HCC)    Oropharyngeal dysphagia    Alzheimer Disease    Chronic bronchitis (HCC)      Plan:    · Displaced Intertrochanter fracture of L femur  · POD 5 s/p conversion L hip hemiarthroplasty and removal of deep implant L proximal femur  · Ortho following   · Continue Lovenox 30mg Daily x 28 days  · Continue PT/OT  · Awaiting skilled nursing placement   · Fall From Bed  · Likely mechanical  · Recurrent falls in the past few months - admitted in January 2018 with subarachnoid and intraparenchymal hemmorage secondary to fall at home, and then again in May 2018 with intertochanteric fracture of the L hip secondary to unwittnessed fall from nursing home     · Continue fall precautions, bed/chair alarm  · Chronic Diastolic Heart Failure  · ECHO shows EF 60% and no regional wall motion abnormality, Moderate MR, Moderate TR, and  estimated peak PA pressure of 50mmhg, suggesting moderate pulmonary HTN  · Continue Atenolol 50mg daily  · Mild edema of b/l LE will continue home Lasix 40mg daily  · Atrial Fibrillation  · Chronic for patient   · HR  throughout hospitalization  · Continue Atenolol 50mg daily for rate control]  · No AC 2/2 recent subarachnoid and intraparenchymal hemmorhage in January 2018  · K 3 2 today - scheduled 10 mEq K replacement daily, will give additional 40mEq today  · Recheck BMP in AM  · CKD Stage 4  · Stable, Cr  1 59 today (baseline Cr 2-2 5)  · Repeat BMP in AM  · Oropharyngeal Dysphagia  · Continue dysphagia pureed diet with honey thick liquids  · Encourage eating/drinking   · Alzheimer Disease  · Alert/Oriented to self, disoriented to place, time, situation  · Continue delirium precautions  · Continue PRN Quetiapine HS  · Chronic Bronchitis  · Stable at this time  · Continue Symbicort, Atrovent, Xopenex       VTE Pharmacologic Prophylaxis:   Pharmacologic: Enoxaparin (Lovenox)  Mechanical VTE Prophylaxis in Place: No    Patient Centered Rounds: I have performed bedside rounds with nursing staff today  Discussions with Specialists or Other Care Team Provider: Ortho, Nursing    Education and Discussions with Family / Patient: all patient questions answered to the best of my ability    Time Spent for Care: 20 minutes  More than 50% of total time spent on counseling and coordination of care as described above  Current Length of Stay: 6 day(s)    Current Patient Status: Inpatient   Certification Statement: d/c today pending placement    Discharge Plan / Estimated Discharge Date:     Code Status: Level 1 - Full Code      Subjective:   Patient is pleasant and states she feels great today  She is disoriented to place and time but very conversant  She states she has no hip pain, and is ready to go to rehab  She denies chest pain, shortness of breath, nausea, vomiting, diarrhea, dysuria  Objective:     Vitals:   Temp (24hrs), Av 3 °F (36 8 °C), Min:97 7 °F (36 5 °C), Max:99 2 °F (37 3 °C)    HR:  [83-90] 90  Resp:  [18] 18  BP: (116-120)/(69-83) 120/79  SpO2:  [94 %-97 %] 94 %  Body mass index is 32 65 kg/m²  Input and Output Summary (last 24 hours):        Intake/Output Summary (Last 24 hours) at 18 0823  Last data filed at 18 0617   Gross per 24 hour   Intake              470 ml   Output                0 ml   Net              470 ml Physical Exam:     Physical Exam   Constitutional: She appears well-developed and well-nourished  HENT:   Head: Normocephalic and atraumatic  Eyes: Pupils are equal, round, and reactive to light  Neck: Normal range of motion  Cardiovascular: Normal rate  No murmur heard  Irregular rhythm     Pulmonary/Chest: Effort normal and breath sounds normal  No respiratory distress  Clear breath sounds in all lung fields   Abdominal: Soft  Bowel sounds are normal  She exhibits no distension  There is no tenderness  Musculoskeletal: Normal range of motion  She exhibits edema (mild +1 edema b/l LE)  Neurological: She is alert  Oriented to person, disoriented to place, time , and situation  Equal 5/5 strength in b/l UE  5/5 strength in RLE and 4/5 strength in LLE   Skin: Skin is warm and dry  L hip incision with staples - clean, dry, and intact       Additional Data:     Labs:      Results from last 7 days  Lab Units 07/18/18  0528   WBC Thousand/uL 8 67   HEMOGLOBIN g/dL 9 6*   HEMATOCRIT % 32 2*   PLATELETS Thousands/uL 367   NEUTROS PCT % 67   LYMPHS PCT % 11*   MONOS PCT % 10   EOS PCT % 12*       Results from last 7 days  Lab Units 07/18/18  0527  07/11/18  1713   SODIUM mmol/L 147*  < > 143   POTASSIUM mmol/L 3 2*  < > 4 2   CHLORIDE mmol/L 110*  < > 103   CO2 mmol/L 33*  < > 32   BUN mg/dL 32*  < > 29*   CREATININE mg/dL 1 59*  < > 1 53*   CALCIUM mg/dL 8 4  < > 8 8   TOTAL PROTEIN g/dL  --   --  7 1   BILIRUBIN TOTAL mg/dL  --   --  0 50   ALK PHOS U/L  --   --  157*   ALT U/L  --   --  18   AST U/L  --   --  34   GLUCOSE RANDOM mg/dL 92  < > 107   GLUCOSE, ISTAT   --   < >  --    < > = values in this interval not displayed  Results from last 7 days  Lab Units 07/13/18  0536   INR  1 15       * I Have Reviewed All Lab Data Listed Above  * Additional Pertinent Lab Tests Reviewed:  All Labs For Current Hospital Admission Reviewed    Imaging:    Imaging Reports Reviewed Today Include: CXR, ECHO      Recent Cultures (last 7 days):           Last 24 Hours Medication List:     Current Facility-Administered Medications:  acetaminophen 975 mg Oral Q8H [facility] Matt Alanis MD   albuterol 2 5 mg Nebulization Q4H PRN Antionette Farnsworth MD   allopurinol 100 mg Oral Daily Sudarshan Thakkar MD   aluminum-magnesium hydroxide-simethicone 15 mL Oral Q6H PRN Jerald Harper PA-C   aspirin 325 mg Oral Daily Sudarshan Thakkar MD   atenolol 50 mg Oral Daily Sudarshan Thakkar MD   budesonide-formoterol 2 puff Inhalation BID Sudarshan Thakkar MD   calcium carbonate 1,000 mg Oral Daily PRN Jerald Harper PA-C   docusate sodium 100 mg Oral BID Jerald Harper PA-C   enoxaparin 30 mg Subcutaneous Q24H [facility] Sudarshan Trujillo MD   furosemide 60 mg Oral BID (diuretic) Anabelle De La O PA-C   ipratropium 0 5 mg Nebulization TID Antionette Farnsworth MD   levalbuterol 1 25 mg Nebulization TID Jatinder Diaz MD   nystatin 1 application Topical BID Sudarshan Thakkar MD   ondansetron 4 mg Intravenous Q6H PRN Sudarshan Thakkar MD   oxyCODONE 5 mg Oral Q4H PRN Sudarshan Thakkar MD   pantoprazole 40 mg Oral Early Morning Sudarshan Thakkar MD   polyethylene glycol 17 g Oral Daily Sudarshan Trujillo MD   potassium chloride 10 mEq Oral Every Other Day Sudarshan Trujillo MD   potassium chloride 20 mEq Oral Once Marky Toth PA-C   QUEtiapine 12 5 mg Oral HS PRN Sudarshan Thakkar MD   senna 8 6 mg Oral Daily Sudarshan Thakkar MD   silver sulfadiazine 1 application Topical Daily PRN Sudarshan Thakkar MD   traMADol 50 mg Oral Q6H PRN Jerald Harper PA-C        Today, Patient Was Seen By: Henry Oliver RN    ** Please Note: This note has been constructed using a voice recognition system   **

## 2018-07-18 NOTE — SOCIAL WORK
Per Caden Amin ( PA), pt is medically cleared for d/c to rehab  [facility] is accepting, cm called and spoke to pts daughter Sofie Khalil [phone], she would like to accept the bed  CM informed MV via ECIN and left message with nursing via phone, and a voicemail to admissions office  Transport arranged with [facility]) via BLS at 5:30pm  CM notified pts daughter, RN, facility, and physician  Report# [phone], Fax# [phone]

## 2018-07-18 NOTE — PROGRESS NOTES
Orthopedics   Torito Hancock 80 y o  female MRN: [id number]  Unit/Bed#: CW3 337-01      Subjective:  80 y  o female post operative day 5 left hip removal of hardware and conversion to a hemiarthroplasty  No issues overnight  Worked with PT yesterday  Labs:    0  Lab Value Date/Time   HCT 32 2 (L) 07/18/2018 0528   HCT 31 2 (L) 07/17/2018 0505   HCT 30 1 (L) 07/16/2018 0526   HCT 24 3 (L) 06/02/2018 0515   HGB 9 6 (L) 07/18/2018 0528   HGB 9 7 (L) 07/17/2018 0505   HGB 9 4 (L) 07/16/2018 0526   HGB 8 1 (L) 06/02/2018 0515   INR 1 15 07/13/2018 0536   WBC 8 67 07/18/2018 0528   WBC 8 64 07/17/2018 0505   WBC 9 81 07/16/2018 0526   WBC 11 2 (H) 06/02/2018 0515   ESR 92 (H) 05/29/2018 1353       Meds:    Current Facility-Administered Medications:     acetaminophen (TYLENOL) tablet 975 mg, 975 mg, Oral, Q8H [address], Matt Alanis MD, 975 mg at 07/18/18 0620    albuterol inhalation solution 2 5 mg, 2 5 mg, Nebulization, Q4H PRN, Antionette Farnsworth MD    allopurinol (ZYLOPRIM) tablet 100 mg, 100 mg, Oral, Daily, Sudarshan Ribeiro MD, 100 mg at 07/17/18 0823    aluminum-magnesium hydroxide-simethicone (MYLANTA) 200-200-20 mg/5 mL oral suspension 15 mL, 15 mL, Oral, Q6H PRN, Radha Danielson PA-C    aspirin tablet 325 mg, 325 mg, Oral, Daily, Sudarshan Ribeiro MD, 325 mg at 07/17/18 0823    atenolol (TENORMIN) tablet 50 mg, 50 mg, Oral, Daily, Sudarshan Ribeiro MD, 50 mg at 07/17/18 0823    budesonide-formoterol (SYMBICORT) 160-4 5 mcg/act inhaler 2 puff, 2 puff, Inhalation, BID, Sudarshan Thakkar MD, 2 puff at 07/17/18 1703    calcium carbonate (TUMS) chewable tablet 1,000 mg, 1,000 mg, Oral, Daily PRN, Radha Danielson PA-C    docusate sodium (COLACE) capsule 100 mg, 100 mg, Oral, BID, Radha Danielson PA-C, 100 mg at 07/17/18 1702    enoxaparin (LOVENOX) subcutaneous injection 30 mg, 30 mg, Subcutaneous, Q24H [facility], Sudarshan Ribeiro MD, 30 mg at 07/17/18 0823    furosemide (LASIX) tablet 60 mg, 60 mg, Oral, BID (diuretic), Lidia Rogers PA-C    ipratropium (ATROVENT) 0 02 % inhalation solution 0 5 mg, 0 5 mg, Nebulization, TID, Antionette Farnsworth MD, 0 5 mg at 07/17/18 1903    levalbuterol (XOPENEX) inhalation solution 1 25 mg, 1 25 mg, Nebulization, TID, Antionette Farnsworth MD, 1 25 mg at 07/17/18 1903    nystatin (MYCOSTATIN) powder 1 application, 1 application, Topical, BID, Sudarshan Gallegos MD, 1 application at 60/75/01 1703    ondansetron (ZOFRAN) injection 4 mg, 4 mg, Intravenous, Q6H PRN, Sudarshan Thakkar MD    oxyCODONE (ROXICODONE) IR tablet 5 mg, 5 mg, Oral, Q4H PRN, Sudarshan Thakkar MD    pantoprazole (PROTONIX) EC tablet 40 mg, 40 mg, Oral, Early Morning, Sudarshan Gallegos MD, 40 mg at 07/18/18 0620    polyethylene glycol (MIRALAX) packet 17 g, 17 g, Oral, Daily, Sudarshan Gallegos MD, 17 g at 07/15/18 0919    potassium chloride (K-DUR,KLOR-CON) CR tablet 10 mEq, 10 mEq, Oral, Every Other Day, Sudarshan Gallegos MD, 10 mEq at 07/16/18 0918    potassium chloride (K-DUR,KLOR-CON) CR tablet 20 mEq, 20 mEq, Oral, Once, Marky Toth PA-C    QUEtiapine (SEROquel) tablet 12 5 mg, 12 5 mg, Oral, HS PRN, Sudarshan Gallegos MD, 12 5 mg at 07/16/18 2158    senna (SENOKOT) tablet 8 6 mg, 8 6 mg, Oral, Daily, Sudarshan Gallegos MD, 8 6 mg at 07/17/18 0823    silver sulfadiazine (SILVADENE,SSD) 1 % cream 1 application, 1 application, Topical, Daily PRN, Sudarshan Gallegos MD, 1 application at 25/91/17 0850    traMADol (ULTRAM) tablet 50 mg, 50 mg, Oral, Q6H PRN, Shane Davis PA-C, 50 mg at 07/17/18 1701    Blood Culture:   No results found for: BLOODCX    Wound Culture:   Lab Results   Component Value Date    WOUNDCULT 2+ Growth of Staphylococcus aureus (A) 02/15/2018       Ins and Outs:  I/O last 24 hours:   In: 550 [P O :550]  Out: 1354 [Urine:1354]          Physical:  Vitals:    07/17/18 2322   BP: 116/83   Pulse: 90   Resp: 18   Temp: 97 7 °F (36 5 °C)   SpO2: 95%     left lower extremity  · Incision is C/D/I  · Sensation intact L1-S1  · Motor intact to knee flexion/extension, EHL/FHL  · 2+ dorsalis pedis pulse    _*_*_*_*_*_*_*_*_*_*_*_*_*_*_*_*_*_*_*_*_*_*_*_*_*_*_*_*_*_*_*_*_*_*_*_*_*_*_*_*_*    Assessment: 80 y  o female post operative day 5 left hip hemiarthroplasty   Doing well    Plan:  · Up and out of bed  · Weightbearing as tolerated left lower extremity  · PT/OT as able  · DVT prophylaxis - lovenox  · Analgesics as needed  · Dispo: [id number] Tiffanie Walker for discharge from ortho perspective  · Patient noted to have acute blood loss anemia due to a drop in Hbg of > 2 0g from preop levels, will monitor vital signs and resuscitate with IV fluids as needed     Galina Meneses

## 2018-07-18 NOTE — OCCUPATIONAL THERAPY NOTE
Occupational Therapy Cancel Note:    Occupational therapy attempted however pt is agitated and unwilling to participate  OT will attempt again next treatment       Lynnwood Baumgarten

## 2018-07-20 NOTE — SOCIAL WORK
ADELINA rec'd call from pts son in [facility] [phone], stating pts bottom teeth were not sent with her when she discharged on Wednesday to [facility] [phone]  CM called [address] [facility] [address] she was out of office, cm spoke with Felipe Olivares, and advised her pts teeth were left on Wednesday when she transferred to [facility]  Per Kendy Calvin she will send them via , adelina gave Kendy Calvin pts son in [phone] [facility] phone number, and number for   [phone]  ADELINA called son in [facility] and left message with him regarding adelina's discussion with Kendy Calvin

## 2018-07-26 ENCOUNTER — HOSPITAL ENCOUNTER (OUTPATIENT)
Dept: RADIOLOGY | Facility: HOSPITAL | Age: 83
Discharge: HOME | End: 2018-07-26
Attending: ORTHOPAEDIC SURGERY
Payer: COMMERCIAL

## 2018-07-26 ENCOUNTER — OFFICE VISIT (OUTPATIENT)
Dept: OBGYN CLINIC | Facility: HOSPITAL | Age: 83
End: 2018-07-26

## 2018-07-26 DIAGNOSIS — Z98.890 STATUS POST SURGERY: ICD-10-CM

## 2018-07-26 DIAGNOSIS — M25.552 PAIN IN LEFT HIP: Primary | ICD-10-CM

## 2018-07-26 DIAGNOSIS — M25.552 PAIN IN LEFT HIP: ICD-10-CM

## 2018-07-26 PROCEDURE — 99024 POSTOP FOLLOW-UP VISIT: CPT | Performed by: ORTHOPAEDIC SURGERY

## 2018-07-26 PROCEDURE — 73502 X-RAY EXAM HIP UNI 2-3 VIEWS: CPT

## 2018-07-26 NOTE — PROGRESS NOTES
86 y o female presents for 2 weeks postoperative visit status post left  Posterior conversion hip hemiarthroplasty  Patient states she has been doing well she denies any calf pain chest pain shortness of breath numbness and tingling left lower extremity  Patient states she has been able to bear weight in her left lower extremity without significant difficulty  Patient presents today with stretcher for transportation services  Review of Systems  Review of systems negative unless otherwise specified in HPI    Past Medical History  Past Medical History:   Diagnosis Date    A-fib [facility])     Alzheimer's dementia     CHF [facility]congestive heart failure) ([facility] [address] )     Gout     Hypertension     Renal disorder        Past Surgical History  Past Surgical History:   Procedure Laterality Date    HARDWARE REMOVAL Left 7/13/2018    Procedure: REMOVAL HARDWARE HIP;  Surgeon: Caesar Daily MD;  [facility];  Service: Orthopedics    VT OPEN RX FEMUR FX+INTRAMED DUSTIN Left 5/28/2018    Procedure: INSERTION NAIL IM FEMUR ANTEGRADE (TROCHANTERIC);   Surgeon: Félix Jensen MD;  [facility];  Service: Orthopedics    VT PARTIAL HIP REPLACEMENT Left 7/13/2018    Procedure: HEMIARTHROPLASTY HIP (BIPOLAR) POSTERIOR;  Surgeon: Caesar Daily MD;  [facility];  Service: Orthopedics       Current Medications  Current Outpatient Prescriptions on File Prior to Visit   Medication Sig Dispense Refill    acetaminophen (TYLENOL) 325 mg tablet Take 3 tablets (975 mg total) by mouth 3 (three) times a day 30 tablet 0    allopurinol (ZYLOPRIM) 100 mg tablet Take 100 mg by mouth daily      aluminum-magnesium hydroxide-simethicone (MYLANTA) 200-200-20 mg/5 mL suspension Take 15 mL by mouth every 6 (six) hours as needed for indigestion or heartburn 355 mL 0    aspirin 325 mg tablet Take 325 mg by mouth daily      atenolol (TENORMIN) 50 mg tablet Take 1 tablet (50 mg total) by mouth daily 30 tablet 0    Budesonide (PULMICORT IN) Inhale      budesonide-formoterol (SYMBICORT) 160-4 5 mcg/act inhaler Inhale 2 puffs 2 (two) times a day      cholecalciferol (VITAMIN D3) 1,000 units tablet Take 2,000 Units by mouth daily      docusate sodium (COLACE) 100 mg capsule Take 100 mg by mouth 2 (two) times a day as needed for constipation      enoxaparin (LOVENOX) 30 mg/0 3 mL Inject 0 3 mL (30 mg total) under the skin every 24 hours for 23 days 6 9 mL 0    ferrous sulfate 324 (65 Fe) mg Take 1 tablet (324 mg total) by mouth 2 (two) times a day before meals 60 tablet 0    furosemide (LASIX) 40 mg tablet Take 1 tablet (40 mg total) by mouth daily 30 tablet 0    ipratropium-albuterol (DUO-NEB) 0 5-2 5 mg/3 mL Take 3 mL by nebulization 4 (four) times a day      Magnesium 65 MG TABS Take 65 mg by mouth daily      Magnesium Oxide (MAG-OX PO) Take 400 mg by mouth      Melatonin-Pyridoxine (EQL MELATONIN/VITAMIN B-6) 5-1 MG TABS Take 1 tablet by mouth daily at bedtime      nystatin (MYCOSTATIN) powder Apply 1 application topically 2 (two) times a day as needed      oxyCODONE (ROXICODONE) 5 mg immediate release tablet Take 1 tablet (5 mg total) by mouth every 6 (six) hours as needed for moderate pain or severe pain Take 1-2 tablets every 4-6 hrs as needed for pain control Max Daily Amount: 20 mg 30 tablet 0    pantoprazole (PROTONIX) 40 mg tablet Take 1 tablet (40 mg total) by mouth daily in the early morning 30 tablet 0    polyethylene glycol (MIRALAX) 17 g packet Take 17 g by mouth daily      potassium chloride (K-DUR,KLOR-CON) 10 mEq tablet Take 10 mEq by mouth every other day        QUEtiapine (SEROquel) 25 mg tablet Take 0 5 tablets (12 5 mg total) by mouth daily at bedtime as needed (agitation / sundowning ) 15 tablet 0    senna (SENOKOT) 8 6 mg Take 2 tablets (17 2 mg total) by mouth daily 120 each 0    silver sulfadiazine (SILVADENE,SSD) 1 % cream Apply 1 application topically daily as needed for wound care No current facility-administered medications on file prior to visit          Recent Labs [facility])    0  Lab Value Date/Time   HCT 32 2 (L) 07/18/2018 0528   HCT 24 3 (L) 06/02/2018 0515   HGB 9 6 (L) 07/18/2018 0528   HGB 8 1 (L) 06/02/2018 0515   WBC 8 67 07/18/2018 0528   WBC 11 2 (H) 06/02/2018 0515   INR 1 15 07/13/2018 0536   ESR 92 (H) 05/29/2018 1353   GLUCOSE 92 07/18/2018 0527   GLUCOSE 148 (H) 07/13/2018 1029   HGBA1C 5 4 05/27/2018 1950         Physical exam  · General: Awake, Alert, Oriented  · Eyes: Pupils equal, round and reactive to light  · Heart: regular rate and rhythm  · Lungs: No audible wheezing    left Lower extremity  · Examination of patient's left lower extremity well-healed lateral incision no evidence of erythema no tenderness to palpation no pain with internal-external rotation calf nontender palpation ankle dorsi plantar flexion strength 5/5 sensation intact distal pulses present    Imaging  X-rays left hip reviewed x-rays reveal well reduced left hip hemiarthroplasty no evidence of hardware failure    Assessment:  Status post 2 weeks left hip conversion hemiarthroplasty removal of hardware  Plan:  weightbearing as tolerated left lower extremity  Posterior total hip precautions  Complete DVT prophylaxis times 28 days from surgery  Physical therapy range of motion strengthening gait training  Follow-up in 2 months time or sooner if needed

## 2018-08-02 ENCOUNTER — TRANSCRIBE ORDERS (OUTPATIENT)
Dept: ADMINISTRATIVE | Facility: HOSPITAL | Age: 83
End: 2018-08-02

## 2018-08-02 DIAGNOSIS — R13.19 ESOPHAGEAL DYSPHAGIA: Primary | ICD-10-CM

## 2018-08-10 ENCOUNTER — HOSPITAL ENCOUNTER (OUTPATIENT)
Dept: RADIOLOGY | Facility: HOSPITAL | Age: 83
Discharge: HOME | End: 2018-08-10
Payer: COMMERCIAL

## 2018-08-10 DIAGNOSIS — R13.19 ESOPHAGEAL DYSPHAGIA: ICD-10-CM

## 2018-08-10 PROCEDURE — G8996 SWALLOW CURRENT STATUS: HCPCS

## 2018-08-10 PROCEDURE — G8997 SWALLOW GOAL STATUS: HCPCS

## 2018-08-10 PROCEDURE — 74230 X-RAY XM SWLNG FUNCJ C+: CPT

## 2018-08-10 PROCEDURE — G8998 SWALLOW D/C STATUS: HCPCS

## 2018-08-10 PROCEDURE — 92611 MOTION FLUOROSCOPY/SWALLOW: CPT

## 2018-08-10 NOTE — PROCEDURES
Speech-Language Pathology Videofluoroscopic Swallow Study      Patient Name: Faith Gonzales    VQBTM'A Date: 8/10/2018     Problem List  Patient Active Problem List   Diagnosis    Subdural hemorrhage    Alzheimer disease    Chronic diastolic congestive heart failure ([facility] )    HTN (hypertension)    Atrial fibrillation    Stage 4 [facility] kidney disease ([facility]    Intraparenchymal hematoma of brain due to trauma   [facility] (subarachnoid hemorrhage)    Fall from bed    Open fracture of hip ([facility] )    Displaced intertrochanteric fracture of left femur, initial encounter for closed fracture ([facility] )    Acute blood loss anemia    Oropharyngeal dysphagia    Left hip pain    Ambulatory dysfunction    Chronic bronchitis (HCC)    Hypernatremia       Past Medical History  Past Medical History:   Diagnosis Date    A-fib ([facility] )     Alzheimer's dementia     CHF (congestive heart failure) ([facility] )     Gout     Hypertension     Renal disorder        Past Surgical History  Past Surgical History:   Procedure Laterality Date    HARDWARE REMOVAL Left 7/13/2018    Procedure: REMOVAL HARDWARE HIP;  Surgeon: Nya Dunlap MD;  [facility];  Service: Orthopedics    MD OPEN RX FEMUR FX+INTRAMED DUSTIN Left 5/28/2018    Procedure: INSERTION NAIL IM FEMUR ANTEGRADE (TROCHANTERIC); Surgeon: Dee Ramsey MD;  Location: [facility];  Service: Orthopedics    MD PARTIAL HIP REPLACEMENT Left 7/13/2018    Procedure: HEMIARTHROPLASTY HIP (BIPOLAR) POSTERIOR;  Surgeon: Nya Dunlap MD;  [facility]: [facility];  Service: Orthopedics         General Information;  Pt is a 80 y o  female with a PMH remarkable for Alzheimer's, CHF, HTN, A-fib, brain hematoma, GERD and dysphagia  Current concerns for dysphagia include cough and wet vocal quality during PO intake  VBS was recommended to assess oropharyngeal stage swallowing skills at this time   Pt was viewed in lateral position and was given trials of pureed, soft moist (applesauce, sliced banana, grpind meat with gravy) as well as honey (moderately thick), nectar (mildly thick), and thin liquid  A mixed consistency item (fruit in juice) and a13mm pill with honey thick liquid were also administered  Note: Pt was seen by SLP as an inpatient 5/29/2018 post fall with hip fx  Report from [facility] indicated:  Summary:  Pt presents w/ need for persistent stim to participate in session s/p sx yesterday  Present w/ nurse w/ med administration  Meds need to be crushed  Pt needs cues to swallow, participate  Not appropriate for thin liquid meds at this time  Recommendations:  Diet: dysphagia 1 puree for now  Liquid: honey thick by tsp for now  Meds: crush  Supervision: full  Positioning:Upright  Strategies: Pt to take PO/Meds only when fully alert and upright  Feed only when fully alert  Cue to swallow as needed  Check mouth  Oral care  Aspiration precautions      Oral stage; Pt presented with moderate oral stage dysphagia  Bolus manipulation and control are reduced  Pt presents with premature spillage to the pyriforms with all liquid consistencies as well as puree  With soft solids and mixed consistencies, pt demonstrated further reduced bolus control with significant spillage and pooling in the pyriforms and vallecula during mastication  Mastication is prolonged with soft solids  Pharyngeal stage; Pt presented with moderate pharyngeal dysphagia  Swallowing initiation is mildly delayed with liquid consistencies and moderately delayed with soft solids and mixed consistencies  Hyolaryngeal elevation is reduced, and pharyngeal constriction appears mildly weak  Chronic deep penetration noted with thin and nectar thick liquid, as well as mixed consistency and soft solids (prolonged mastication results in more spillage prior to swallow)  Transient high penetration noted intermittently with honey thick and puree textures   No steven aspiration observed today however suspect high risk with thin, NTL, mixed, and soft sloids  Generally minimal pharyngeal residual noted however on occasion there is mild-moderate vallecular-pyriform retention (honey thick) which then begins to pool in the pyriforms  Pt has poor sensation/awareness of the residual  Without cueing she did eventually trigger a second swallow to clear, however after approx  15-20 sec delay  Suspect occasional spillover of residual/pooling may be the cause of reported vocal wetness during meals  Pt likely has poor sensation to saliva, which may put her at risk for aspiration  Management of food/liquid follows: No steven aspiration noted with materials administered today  Pt does appear to be at high risk for aspiration with thin and NTL, as well as with soft solids and mixed consistencies (textures which require mastication/bolus breakdown and formation)  Safest appearing textures are honey thick liquids and puree  Strategies and Efficacy: Second dry swallow is beneficial in clearing residual  May improve dry swallow response given verbal cues  Aspiration Response and Efficacy:  No aspiration seen, pt had no cough response or throat clearing with chronic penetration  Esophageal stage;  Esophageal screening was completed  First provided 1/2 barium tablet, then full tablet with honey thick liquid  Pill passed swiftly/with ease through the pharynx and UES  Esophageal screen indicated no holdup of the barium tablet  Medial esophagus appeared somewhat wide/open  No significant backflow noted  Assessment Summary; Pt presents with moderate oropharyngeal dysphagia characterized by reduced bolus manipulation and control, premature spillage to the pyriforms with all consistencies, delayed swallow initiation and reduced hyolaryngeal elevation  Deep transient penetration noted with thin and nectar thick liquid as well as mixed and soft solid/ground consistencies   Intermittent high penetration seen with honey thick liquids and puree  No steven aspiration noted  Pt appears at least risk for aspiration with current diet textures, puree and honey thick liquids  Recommendations: puree/level 1 diet and honey thick liquids     Recommended Form of Meds: whole with liquid (honey thick)    Aspiration precautions and compensatory swallowing strategies: upright posture, only feed when fully alert, slow rate of feeding, small bites/sips and repeat dry swallow after every few bites/sips and at end of meal    Results Reviewed with: patient     Treatment Recommended:  Follow up with SLP at [facility] as indicated

## (undated) DEVICE — ALL PURPOSE SPONGES,NONWOVEN, 4 PLY: Brand: CURITY

## (undated) DEVICE — 3.2MM GUIDE WIRE 400MM

## (undated) DEVICE — CHLORAPREP HI-LITE 26ML ORANGE

## (undated) DEVICE — GAUZE SPONGES,USP TYPE VII GAUZE, 12 PLY: Brand: CURITY

## (undated) DEVICE — 3M™ TEGADERM™ TRANSPARENT FILM DRESSING FRAME STYLE, 1626W, 4 IN X 4-3/4 IN (10 CM X 12 CM), 50/CT 4CT/CASE: Brand: 3M™ TEGADERM™

## (undated) DEVICE — 2108 SERIES SAGITTAL BLADE (18.6 X 0.64 X 61.1MM)

## (undated) DEVICE — SUTURE RETRIEVER HOFFEE

## (undated) DEVICE — DRESSING MEPILEX AG BORDER 4 X 4 IN

## (undated) DEVICE — BUTTON SWITCH PENCIL HOLSTER: Brand: VALLEYLAB

## (undated) DEVICE — SUT VICRYL PLUS 1 CTB-1 36 IN VCPB947H

## (undated) DEVICE — SUT VICRYL PLUS 0 CTB-1 27 IN VCPB260H

## (undated) DEVICE — 4.0MM THREE-FLUTED DRILL BIT QC/260MM/65MM CALIBRATION

## (undated) DEVICE — INTENDED FOR TISSUE SEPARATION, AND OTHER PROCEDURES THAT REQUIRE A SHARP SURGICAL BLADE TO PUNCTURE OR CUT.: Brand: BARD-PARKER SAFETY BLADES SIZE 15, STERILE

## (undated) DEVICE — DRAPE SHEET THREE QUARTER

## (undated) DEVICE — CYSTO TUBING SINGLE IRRIGATION

## (undated) DEVICE — PLUMEPEN PRO 10FT

## (undated) DEVICE — SILVER-COATED ANTIMICROBIAL BARRIER DRESSING: Brand: ACTICOAT   4" X 8"

## (undated) DEVICE — REM POLYHESIVE ADULT PATIENT RETURN ELECTRODE: Brand: VALLEYLAB

## (undated) DEVICE — IMPERVIOUS STOCKINETTE: Brand: DEROYAL

## (undated) DEVICE — HOOD: Brand: FLYTE, SURGICOOL

## (undated) DEVICE — ULTRACLEAN ACCESSORY ELECTRODE 1" (2.54 CM) COATED BLADE: Brand: ULTRACLEAN

## (undated) DEVICE — ABDOMINAL PAD: Brand: DERMACEA

## (undated) DEVICE — SUT VICRYL PLUS 2-0 CTB-1 27 IN VCPB259H

## (undated) DEVICE — DRAPE SURGIKIT SADDLE BAG

## (undated) DEVICE — STERILE ORIF HIP PACK: Brand: CARDINAL HEALTH

## (undated) DEVICE — GLOVE INDICATOR PI UNDERGLOVE SZ 7.5 BLUE

## (undated) DEVICE — ACE WRAP 6 IN UNSTERILE

## (undated) DEVICE — ARTHROSCOPY FLOOR MAT

## (undated) DEVICE — 3M™ TEGADERM™ TRANSPARENT FILM DRESSING FRAME STYLE, 1628, 6 IN X 8 IN (15 CM X 20 CM), 10/CT 8CT/CASE: Brand: 3M™ TEGADERM™

## (undated) DEVICE — BLADE ELECTRODE: Brand: EDGE

## (undated) DEVICE — GLOVE INDICATOR PI UNDERGLOVE SZ 8.5 BLUE

## (undated) DEVICE — 2.5MM REAMING ROD WITH BALL TIP/950MM-STERILE

## (undated) DEVICE — INTENDED FOR TISSUE SEPARATION, AND OTHER PROCEDURES THAT REQUIRE A SHARP SURGICAL BLADE TO PUNCTURE OR CUT.: Brand: BARD-PARKER SAFETY BLADES SIZE 10, STERILE

## (undated) DEVICE — TRAY FOLEY 16FR URIMETER SURESTEP

## (undated) DEVICE — Device

## (undated) DEVICE — PADDING CAST 4 IN  COTTON STRL

## (undated) DEVICE — GLOVE SRG BIOGEL 7.5

## (undated) DEVICE — 3M™ IOBAN™ 2 ANTIMICROBIAL INCISE DRAPE 6650EZ: Brand: IOBAN™ 2

## (undated) DEVICE — BETHLEHEM TOTAL HIP, KIT: Brand: CARDINAL HEALTH

## (undated) DEVICE — GLOVE SRG BIOGEL 8

## (undated) DEVICE — THE SIMPULSE SOLO SYSTEM WITH ULTREX RETRACTABLE SPLASH SHIELD TIP: Brand: SIMPULSE SOLO

## (undated) DEVICE — DUAL CUT SAGITTAL BLADE

## (undated) DEVICE — SPONGE PVP SCRUB WING STERILE

## (undated) DEVICE — U-DRAPE: Brand: CONVERTORS

## (undated) DEVICE — CAPIT HIP STEM POR PRIMARY

## (undated) DEVICE — CAPIT HIP BIPOLAR HEAD POR PRIMARY

## (undated) DEVICE — 6617 IOBAN II PATIENT ISOLATION DRAPE 5/BX,4BX/CS: Brand: STERI-DRAPE™ IOBAN™ 2